# Patient Record
Sex: MALE | Race: OTHER
[De-identification: names, ages, dates, MRNs, and addresses within clinical notes are randomized per-mention and may not be internally consistent; named-entity substitution may affect disease eponyms.]

---

## 2017-04-27 ENCOUNTER — HOSPITAL ENCOUNTER (OUTPATIENT)
Dept: HOSPITAL 8 - LAB | Age: 82
Discharge: HOME | End: 2017-04-27
Attending: FAMILY MEDICINE
Payer: MEDICARE

## 2017-04-27 DIAGNOSIS — Z12.5: Primary | ICD-10-CM

## 2017-04-27 LAB
AST SERPL-CCNC: 18 U/L (ref 15–37)
BUN SERPL-MCNC: 18 MG/DL (ref 7–18)
PSA SERPL-MCNC: 1.76 NG/ML (ref 0–4)

## 2017-04-27 PROCEDURE — 80061 LIPID PANEL: CPT

## 2017-04-27 PROCEDURE — 36415 COLL VENOUS BLD VENIPUNCTURE: CPT

## 2017-04-27 PROCEDURE — 85025 COMPLETE CBC W/AUTO DIFF WBC: CPT

## 2017-04-27 PROCEDURE — 80053 COMPREHEN METABOLIC PANEL: CPT

## 2017-04-27 PROCEDURE — 84153 ASSAY OF PSA TOTAL: CPT

## 2018-08-09 ENCOUNTER — HOSPITAL ENCOUNTER (EMERGENCY)
Dept: HOSPITAL 8 - ED | Age: 83
Discharge: HOME | End: 2018-08-09
Payer: MEDICARE

## 2018-08-09 VITALS — SYSTOLIC BLOOD PRESSURE: 135 MMHG | DIASTOLIC BLOOD PRESSURE: 82 MMHG

## 2018-08-09 VITALS — WEIGHT: 214.95 LBS | BODY MASS INDEX: 34.55 KG/M2 | HEIGHT: 66 IN

## 2018-08-09 DIAGNOSIS — R10.13: Primary | ICD-10-CM

## 2018-08-09 LAB
ALBUMIN SERPL-MCNC: 3.9 G/DL (ref 3.4–5)
ALP SERPL-CCNC: 48 U/L (ref 45–117)
ALT SERPL-CCNC: 31 U/L (ref 12–78)
ANION GAP SERPL CALC-SCNC: 7 MMOL/L (ref 5–15)
BASOPHILS # BLD AUTO: 0.04 X10^3/UL (ref 0–0.1)
BASOPHILS NFR BLD AUTO: 0 % (ref 0–1)
BILIRUB SERPL-MCNC: 1.2 MG/DL (ref 0.2–1)
CALCIUM SERPL-MCNC: 9.1 MG/DL (ref 8.5–10.1)
CHLORIDE SERPL-SCNC: 106 MMOL/L (ref 98–107)
CREAT SERPL-MCNC: 1.24 MG/DL (ref 0.7–1.3)
EOSINOPHIL # BLD AUTO: 0.14 X10^3/UL (ref 0–0.4)
EOSINOPHIL NFR BLD AUTO: 2 % (ref 1–7)
ERYTHROCYTE [DISTWIDTH] IN BLOOD BY AUTOMATED COUNT: 15.4 % (ref 9.4–14.8)
LYMPHOCYTES # BLD AUTO: 1.79 X10^3/UL (ref 1–3.4)
LYMPHOCYTES NFR BLD AUTO: 19 % (ref 22–44)
MCH RBC QN AUTO: 32.7 PG (ref 27.5–34.5)
MCHC RBC AUTO-ENTMCNC: 33.9 G/DL (ref 33.2–36.2)
MCV RBC AUTO: 96.5 FL (ref 81–97)
MD: NO
MONOCYTES # BLD AUTO: 0.81 X10^3/UL (ref 0.2–0.8)
MONOCYTES NFR BLD AUTO: 9 % (ref 2–9)
NEUTROPHILS # BLD AUTO: 6.69 X10^3/UL (ref 1.8–6.8)
NEUTROPHILS NFR BLD AUTO: 71 % (ref 42–75)
PLATELET # BLD AUTO: 275 X10^3/UL (ref 130–400)
PMV BLD AUTO: 7.2 FL (ref 7.4–10.4)
PROT SERPL-MCNC: 8.3 G/DL (ref 6.4–8.2)
RBC # BLD AUTO: 5.2 X10^6/UL (ref 4.38–5.82)
TROPONIN I SERPL-MCNC: < 0.015 NG/ML (ref 0–0.04)

## 2018-08-09 PROCEDURE — 93005 ELECTROCARDIOGRAM TRACING: CPT

## 2018-08-09 PROCEDURE — 36415 COLL VENOUS BLD VENIPUNCTURE: CPT

## 2018-08-09 PROCEDURE — 83690 ASSAY OF LIPASE: CPT

## 2018-08-09 PROCEDURE — 71045 X-RAY EXAM CHEST 1 VIEW: CPT

## 2018-08-09 PROCEDURE — 76700 US EXAM ABDOM COMPLETE: CPT

## 2018-08-09 PROCEDURE — 83880 ASSAY OF NATRIURETIC PEPTIDE: CPT

## 2018-08-09 PROCEDURE — 99285 EMERGENCY DEPT VISIT HI MDM: CPT

## 2018-08-09 PROCEDURE — 80053 COMPREHEN METABOLIC PANEL: CPT

## 2018-08-09 PROCEDURE — 85025 COMPLETE CBC W/AUTO DIFF WBC: CPT

## 2018-08-09 PROCEDURE — 84484 ASSAY OF TROPONIN QUANT: CPT

## 2019-07-04 ENCOUNTER — HOSPITAL ENCOUNTER (EMERGENCY)
Dept: HOSPITAL 8 - ED | Age: 84
Discharge: HOME | End: 2019-07-04
Payer: MEDICARE

## 2019-07-04 VITALS — WEIGHT: 213.85 LBS | HEIGHT: 66 IN | BODY MASS INDEX: 34.37 KG/M2

## 2019-07-04 VITALS — DIASTOLIC BLOOD PRESSURE: 86 MMHG | SYSTOLIC BLOOD PRESSURE: 145 MMHG

## 2019-07-04 DIAGNOSIS — Z87.891: ICD-10-CM

## 2019-07-04 DIAGNOSIS — F31.9: ICD-10-CM

## 2019-07-04 DIAGNOSIS — H10.9: Primary | ICD-10-CM

## 2019-07-04 PROCEDURE — 99283 EMERGENCY DEPT VISIT LOW MDM: CPT

## 2019-07-04 NOTE — NUR
LATE ENTRY FOR 1830  Patient/Caregiver given discharge instructions and they 
have confirmed that they understand the instructions.  Patient ambulatory with 
steady gait. PT LEFT WITH ALL PERSONAL BELONGINGS.

## 2021-01-15 DIAGNOSIS — Z23 NEED FOR VACCINATION: ICD-10-CM

## 2021-06-09 ENCOUNTER — HOSPITAL ENCOUNTER (EMERGENCY)
Dept: HOSPITAL 8 - ED | Age: 86
Discharge: HOME | End: 2021-06-09
Payer: MEDICARE

## 2021-06-09 VITALS — HEIGHT: 69 IN | BODY MASS INDEX: 30.79 KG/M2 | WEIGHT: 207.9 LBS

## 2021-06-09 VITALS — SYSTOLIC BLOOD PRESSURE: 129 MMHG | DIASTOLIC BLOOD PRESSURE: 70 MMHG

## 2021-06-09 DIAGNOSIS — M51.34: Primary | ICD-10-CM

## 2021-06-09 LAB
ANION GAP SERPL CALC-SCNC: 8 MMOL/L (ref 5–15)
BASOPHILS # BLD AUTO: 0 X10^3/UL (ref 0–0.1)
BASOPHILS NFR BLD AUTO: 0 % (ref 0–1)
CALCIUM SERPL-MCNC: 8.6 MG/DL (ref 8.5–10.1)
CHLORIDE SERPL-SCNC: 109 MMOL/L (ref 98–107)
CREAT SERPL-MCNC: 0.86 MG/DL (ref 0.7–1.3)
EOSINOPHIL # BLD AUTO: 0 X10^3/UL (ref 0–0.4)
EOSINOPHIL NFR BLD AUTO: 0 % (ref 1–7)
ERYTHROCYTE [DISTWIDTH] IN BLOOD BY AUTOMATED COUNT: 13.8 % (ref 9.4–14.8)
LYMPHOCYTES # BLD AUTO: 0.7 X10^3/UL (ref 1–3.4)
LYMPHOCYTES NFR BLD AUTO: 10 % (ref 22–44)
MCH RBC QN AUTO: 32.7 PG (ref 27.5–34.5)
MCHC RBC AUTO-ENTMCNC: 34.1 G/DL (ref 33.2–36.2)
MICROSCOPIC: (no result)
MONOCYTES # BLD AUTO: 0.6 X10^3/UL (ref 0.2–0.8)
MONOCYTES NFR BLD AUTO: 9 % (ref 2–9)
NEUTROPHILS # BLD AUTO: 5.9 X10^3/UL (ref 1.8–6.8)
NEUTROPHILS NFR BLD AUTO: 81 % (ref 42–75)
PLATELET # BLD AUTO: 205 X10^3/UL (ref 130–400)
PMV BLD AUTO: 7 FL (ref 7.4–10.4)
RBC # BLD AUTO: 4.85 X10^6/UL (ref 4.38–5.82)

## 2021-06-09 PROCEDURE — 36415 COLL VENOUS BLD VENIPUNCTURE: CPT

## 2021-06-09 PROCEDURE — 99284 EMERGENCY DEPT VISIT MOD MDM: CPT

## 2021-06-09 PROCEDURE — 72110 X-RAY EXAM L-2 SPINE 4/>VWS: CPT

## 2021-06-09 PROCEDURE — 80048 BASIC METABOLIC PNL TOTAL CA: CPT

## 2021-06-09 PROCEDURE — 81001 URINALYSIS AUTO W/SCOPE: CPT

## 2021-06-09 PROCEDURE — 85025 COMPLETE CBC W/AUTO DIFF WBC: CPT

## 2021-06-09 NOTE — NUR
PT PRESENTS TO ED WITH C/O LOWER BACK PAIN X2 WEEKS. PT STATES PAIN STARTED IN 
UPPER LEFT QUADRANT OF BACK AND HAS NOW RADIATED TO LWOER BACK BILATERALLY. PT 
STATES IT HAS GOTTEN PROGRESSIVELY WORSE AND NOW HAS DIFFICULTY WALKING. PT 
DENIES ANY TRAUMA OR OTHER INJURIES. PT A&O, RESPS EVEN AND UNLABORED, NICANOR GOLD. DAUGHTER AT BEDSIDE, CALL LIGHT IN REACH.

## 2021-06-09 NOTE — NUR
PT AMBULATORY TO BATHROOM WITH STEADY GAIT, URINE SAMPLE PROVIDED. STATES PAIN 
IS IMPROVING WITH MEDICATION.

## 2021-06-09 NOTE — NUR
discharge intructions reviewed, pt verbalized understanding. ambulatory to 
discharge with steady gait, no complaints at time of discharge.

## 2021-07-12 ENCOUNTER — HOSPITAL ENCOUNTER (OUTPATIENT)
Dept: HOSPITAL 8 - CFH | Age: 86
Discharge: HOME | End: 2021-07-12
Attending: UROLOGY
Payer: MEDICARE

## 2021-07-12 DIAGNOSIS — N28.89: ICD-10-CM

## 2021-07-12 DIAGNOSIS — K57.30: Primary | ICD-10-CM

## 2021-07-12 DIAGNOSIS — N28.1: ICD-10-CM

## 2021-07-12 DIAGNOSIS — N40.0: ICD-10-CM

## 2021-07-12 PROCEDURE — 74178 CT ABD&PLV WO CNTR FLWD CNTR: CPT

## 2021-07-22 ENCOUNTER — HOSPITAL ENCOUNTER (OUTPATIENT)
Dept: RADIOLOGY | Facility: MEDICAL CENTER | Age: 86
End: 2021-07-22
Payer: MEDICARE

## 2021-08-16 NOTE — CONSULTS
Agree with full note by CHEYANNE Wu. Time spent on day of visit was 30 minutes.    Interventional Oncology Consultation      Re: Sammy Yanez     MRN: [id number]   : 1935    Sammy Yanez was referred by Dano Harding MD. He is a 86 y.o. male seen in clinic for evaluation and possible intervention of a 3.1 cm T1a RIGHT renal mass. He is also under the care of Daina Suero MD and his anticoagulation is managed by Heath Ding MD.    History of Present Illness:   presented with gross hematuria after starting anticoagulants for atrial fibrillation. Workup revealed a normal bladder and a large prostate with no source of bleeding and it was determined to be secondary to his medication. Additional imaging revealed an incidental right renal mass 3.1 cm in size.     He has discussed surgical options and is now presenting to the Interventional Oncology Service for review of imaging studies and discussion of diagnostic and therapeutic options for the renal mass. The lesion has not been biopsied. He has had two surgeries in the past with no problems. He has an upcoming appointment with Dr. Ding . He has received his first COVID vaccination and is scheduled for the second vaccine on . He is accompanied by his daughter Katharine, who is translating American at his request, to the appointment today.     No past medical history on file.  No past surgical history on file.  Social History     Socioeconomic History   • Marital status: Not on file     Spouse name: Not on file   • Number of children: Not on file   • Years of education: Not on file   • Highest education level: Not on file   Occupational History   • Not on file   Tobacco Use   • Smoking status: Not on file   Substance and Sexual Activity   • Alcohol use: Not on file   • Drug use: Not on file   • Sexual activity: Not on file   Other Topics Concern   • Not on file   Social History Narrative   • Not on file     Social Determinants of  Health     Financial Resource Strain:    • Difficulty of Paying Living Expenses:    Food Insecurity:    • Worried About Running Out of Food in the Last Year:    • Ran Out of Food in the Last Year:    Transportation Needs:    • Lack of Transportation (Medical):    • Lack of Transportation (Non-Medical):    Physical Activity:    • Days of Exercise per Week:    • Minutes of Exercise per Session:    Stress:    • Feeling of Stress :    Social Connections:    • Frequency of Communication with Friends and Family:    • Frequency of Social Gatherings with Friends and Family:    • Attends [employer] Services:    • Active Member of Clubs or Organizations:    • Attends Club or Organization Meetings:    • Marital Status:    Intimate Partner Violence:    • Fear of Current or Ex-Partner:    • Emotionally Abused:    • Physically Abused:    • Sexually Abused:      No family history on file.    Review of Systems   Constitutional: Negative.    HENT: Positive for hearing loss.    Respiratory: Negative.    Cardiovascular: Positive for chest pain.        Positive for irregular heart rate   Genitourinary: Negative for flank pain and hematuria.   Musculoskeletal: Positive for back pain (mid back).   Neurological: Positive for dizziness and headaches.   Psychiatric/Behavioral: Negative for substance abuse.     A comprehensive 14-point review of systems was negative except as described above.     Labs:   None available    Pathology:  None available    Radiology:   CT AP on 7/12/21 at [facility]:        No current outpatient medications on file.     No current facility-administered medications for this encounter.       Not on File    Physical Exam  Constitutional:       General: He is not in acute distress.     Appearance: He is not diaphoretic.   HENT:      Head: Normocephalic.   Eyes:      General: No scleral icterus.  Pulmonary:      Effort: Pulmonary effort is normal. No respiratory distress.   Abdominal:      General: There is no  distension.   Skin:     General: Skin is warm and dry.      Coloration: Skin is not pale.      Findings: No erythema or rash.   Neurological:      Mental Status: He is alert and oriented to person, place, and time. He is not disoriented.      Cranial Nerves: No cranial nerve deficit or facial asymmetry.      Sensory: Sensation is intact.      Motor: No weakness or tremor.      Coordination: Coordination normal.      Gait: Gait is intact. Gait normal.   Psychiatric:         Mood and Affect: Mood and affect normal.         Behavior: Behavior normal.         Thought Content: Thought content normal.         Cognition and Memory: Memory normal.         Judgment: Judgment normal.       Impression:   1. T1a RIGHT renal mass, 3 cm, amenable to percutaneous cryoablation.  2. Gross hematuria, secondary to anticoagulation.  3. Atrial fibrillation, on aspirin.  4. Chronic heart failure.   5. Hypertension.    Plan:   Patient presenting with a RIGHT renal mass. We have personally reviewed history and imaging studies, examine the patient and discussed treatment options. As discussed with the patient, there is an 80% chance this is a renal cell carcinoma, which are almost always very slow growing and rarely cause significant morbidity until they approach 4 cm in size. The remainder of these lesions are mostly oncocytomas.     We discussed options including watchful waiting with serial imaging, image guided biopsy, image guided cryoablation with and without biopsy, and nephron sparing surgery or nephrectomy. Percutaneous biopsy and cryoablation may be accomplished separately or on the same procedure date. We discussed the possibility of non-diagnosis despite a possible biopsy attempt. If we proceed to cryoablation without definitive diagnostic tissue sampling the followup regimen will proceed as if we had a cancer diagnosis. We discussed the risks and benefits of each approach. Specific to biopsy and cryoablation we discussed the  risks of bleeding, infection, damage to the kidney or its collecting system, damage to adjacent organs or nerve injury, tract seeding, skin injury, pneumothorax, reaction to medications given during the procedure, and potential hospitalization following the procedure.     After consideration and discussion,  decided to proceed with biopsy and cryoablation in the same setting as it is probable that this renal mass is a renal cell carcinoma and the patient wants to minimize the number of procedures. If technically feasible during the procedure the lesion will be biopsied. We will plan on performing the procedure an outpatient basis with the assistance of the anesthesia service for pain control and also to facilitate respiratory suspension for lesion targeting.  will return to clinic approximately 12 weeks following the procedure with followup imaging and further discussion. If there is residual or recurrent disease we will come up with a surveillance or retreatment plan at the time of the followup visit. The patient has been scheduled for September 20. Written procedure instructions were provided to the patient. We will request clearance to hold aspirin for 3 days pre procedure from his cardiologist and notify him that we anticipate 90 minutes of anesthesia.    Interventional Radiology   [facility]   [address] [id number]  [place] [address]  [phone]

## 2021-08-17 ENCOUNTER — HOSPITAL ENCOUNTER (OUTPATIENT)
Dept: RADIOLOGY | Facility: MEDICAL CENTER | Age: 86
End: 2021-08-17
Attending: UROLOGY
Payer: MEDICARE

## 2021-08-17 DIAGNOSIS — R31.0 GROSS HEMATURIA: ICD-10-CM

## 2021-08-17 ASSESSMENT — ENCOUNTER SYMPTOMS
FLANK PAIN: 0
BACK PAIN: 1
RESPIRATORY NEGATIVE: 1
CONSTITUTIONAL NEGATIVE: 1
DIZZINESS: 1
HEADACHES: 1

## 2021-08-17 ASSESSMENT — LIFESTYLE VARIABLES: SUBSTANCE_ABUSE: 0

## 2021-08-23 ENCOUNTER — HOSPITAL ENCOUNTER (OUTPATIENT)
Dept: HOSPITAL 8 - CFH | Age: 86
Discharge: HOME | End: 2021-08-23
Attending: INTERNAL MEDICINE
Payer: MEDICARE

## 2021-08-23 ENCOUNTER — HOSPITAL ENCOUNTER (OUTPATIENT)
Dept: HOSPITAL 8 - RAD | Age: 86
Discharge: HOME | End: 2021-08-23
Attending: INTERNAL MEDICINE
Payer: MEDICARE

## 2021-08-23 DIAGNOSIS — I25.9: Primary | ICD-10-CM

## 2021-08-23 DIAGNOSIS — I77.810: ICD-10-CM

## 2021-08-23 DIAGNOSIS — I48.91: ICD-10-CM

## 2021-08-23 DIAGNOSIS — I42.9: ICD-10-CM

## 2021-08-23 DIAGNOSIS — I51.7: Primary | ICD-10-CM

## 2021-08-23 DIAGNOSIS — I22.2: ICD-10-CM

## 2021-08-23 PROCEDURE — 93017 CV STRESS TEST TRACING ONLY: CPT

## 2021-08-23 PROCEDURE — A9502 TC99M TETROFOSMIN: HCPCS

## 2021-08-23 PROCEDURE — 71046 X-RAY EXAM CHEST 2 VIEWS: CPT

## 2021-08-23 PROCEDURE — 78452 HT MUSCLE IMAGE SPECT MULT: CPT

## 2021-08-30 ENCOUNTER — HOSPITAL ENCOUNTER (OUTPATIENT)
Dept: HOSPITAL 8 - CFH | Age: 86
Discharge: HOME | End: 2021-08-30
Attending: INTERNAL MEDICINE
Payer: MEDICARE

## 2021-08-30 DIAGNOSIS — I71.2: Primary | ICD-10-CM

## 2021-08-30 DIAGNOSIS — N28.1: ICD-10-CM

## 2021-08-30 DIAGNOSIS — N28.89: ICD-10-CM

## 2021-08-30 DIAGNOSIS — I31.3: ICD-10-CM

## 2021-08-30 DIAGNOSIS — I25.10: ICD-10-CM

## 2021-08-30 PROCEDURE — 71275 CT ANGIOGRAPHY CHEST: CPT

## 2021-08-30 PROCEDURE — 82565 ASSAY OF CREATININE: CPT

## 2021-09-07 ENCOUNTER — PRE-ADMISSION TESTING (OUTPATIENT)
Dept: ADMISSIONS | Facility: MEDICAL CENTER | Age: 86
End: 2021-09-07
Attending: RADIOLOGY
Payer: MEDICARE

## 2021-09-07 RX ORDER — METOPROLOL SUCCINATE 50 MG/1
TABLET, EXTENDED RELEASE ORAL EVERY MORNING
COMMUNITY
Start: 2021-08-30 | End: 2022-09-19

## 2021-09-07 RX ORDER — METOPROLOL SUCCINATE 100 MG/1
TABLET, EXTENDED RELEASE ORAL
COMMUNITY
Start: 2021-09-03

## 2021-09-07 RX ORDER — LISINOPRIL 5 MG/1
5 TABLET ORAL EVERY MORNING
COMMUNITY
Start: 2021-07-07 | End: 2022-04-26 | Stop reason: SDUPTHER

## 2021-09-07 RX ORDER — AMITRIPTYLINE HYDROCHLORIDE 10 MG/1
10 TABLET, FILM COATED ORAL NIGHTLY PRN
COMMUNITY
Start: 2021-08-12 | End: 2021-11-18

## 2021-09-07 RX ORDER — ASPIRIN 325 MG
325 TABLET ORAL PRN
Status: ON HOLD | COMMUNITY
End: 2021-09-20

## 2021-09-07 RX ORDER — TIOTROPIUM BROMIDE 18 UG/1
18 CAPSULE ORAL; RESPIRATORY (INHALATION) DAILY
COMMUNITY
Start: 2021-08-04 | End: 2022-04-26 | Stop reason: CLARIF

## 2021-09-07 RX ORDER — LORATADINE 10 MG
81 TABLET ORAL EVERY MORNING
Status: ON HOLD | COMMUNITY
Start: 2021-06-30 | End: 2021-09-20

## 2021-09-20 ENCOUNTER — APPOINTMENT (OUTPATIENT)
Dept: RADIOLOGY | Facility: MEDICAL CENTER | Age: 86
End: 2021-09-20
Attending: RADIOLOGY
Payer: MEDICARE

## 2021-09-20 ENCOUNTER — HOSPITAL ENCOUNTER (OUTPATIENT)
Facility: MEDICAL CENTER | Age: 86
End: 2021-09-20
Attending: RADIOLOGY | Admitting: RADIOLOGY
Payer: MEDICARE

## 2021-09-20 ENCOUNTER — ANESTHESIA EVENT (OUTPATIENT)
Dept: RADIOLOGY | Facility: MEDICAL CENTER | Age: 86
End: 2021-09-20
Payer: MEDICARE

## 2021-09-20 ENCOUNTER — ANESTHESIA (OUTPATIENT)
Dept: RADIOLOGY | Facility: MEDICAL CENTER | Age: 86
End: 2021-09-20
Payer: MEDICARE

## 2021-09-20 VITALS
HEIGHT: 67 IN | TEMPERATURE: 97.2 F | OXYGEN SATURATION: 92 % | BODY MASS INDEX: 32.66 KG/M2 | WEIGHT: 208.11 LBS | DIASTOLIC BLOOD PRESSURE: 84 MMHG | RESPIRATION RATE: 12 BRPM | HEART RATE: 90 BPM | SYSTOLIC BLOOD PRESSURE: 148 MMHG

## 2021-09-20 DIAGNOSIS — N28.89 RIGHT RENAL MASS: ICD-10-CM

## 2021-09-20 LAB
ANION GAP SERPL CALC-SCNC: 10 MMOL/L (ref 7–16)
BUN SERPL-MCNC: 21 MG/DL (ref 8–22)
CALCIUM SERPL-MCNC: 9.1 MG/DL (ref 8.5–10.5)
CHLORIDE SERPL-SCNC: 104 MMOL/L (ref 96–112)
CO2 SERPL-SCNC: 24 MMOL/L (ref 20–33)
CREAT SERPL-MCNC: 0.82 MG/DL (ref 0.5–1.4)
EKG IMPRESSION: NORMAL
ERYTHROCYTE [DISTWIDTH] IN BLOOD BY AUTOMATED COUNT: 50.8 FL (ref 35.9–50)
EXTERNAL QUALITY CONTROL: NORMAL
GLUCOSE SERPL-MCNC: 96 MG/DL (ref 65–99)
HCT VFR BLD AUTO: 47 % (ref 42–52)
HGB BLD-MCNC: 15.6 G/DL (ref 14–18)
INR PPP: 1.06 (ref 0.87–1.13)
MCH RBC QN AUTO: 32.7 PG (ref 27–33)
MCHC RBC AUTO-ENTMCNC: 33.2 G/DL (ref 33.7–35.3)
MCV RBC AUTO: 98.5 FL (ref 81.4–97.8)
PATHOLOGY CONSULT NOTE: NORMAL
PLATELET # BLD AUTO: 217 K/UL (ref 164–446)
PMV BLD AUTO: 9 FL (ref 9–12.9)
POTASSIUM SERPL-SCNC: 4.3 MMOL/L (ref 3.6–5.5)
PROTHROMBIN TIME: 13.5 SEC (ref 12–14.6)
RBC # BLD AUTO: 4.77 M/UL (ref 4.7–6.1)
SARS-COV+SARS-COV-2 AG RESP QL IA.RAPID: NEGATIVE
SODIUM SERPL-SCNC: 138 MMOL/L (ref 135–145)
WBC # BLD AUTO: 7.1 K/UL (ref 4.8–10.8)

## 2021-09-20 PROCEDURE — 93010 ELECTROCARDIOGRAM REPORT: CPT | Performed by: INTERNAL MEDICINE

## 2021-09-20 PROCEDURE — 88342 IMHCHEM/IMCYTCHM 1ST ANTB: CPT

## 2021-09-20 PROCEDURE — 700111 HCHG RX REV CODE 636 W/ 250 OVERRIDE (IP): Performed by: ANESTHESIOLOGY

## 2021-09-20 PROCEDURE — 80048 BASIC METABOLIC PNL TOTAL CA: CPT

## 2021-09-20 PROCEDURE — 88305 TISSUE EXAM BY PATHOLOGIST: CPT

## 2021-09-20 PROCEDURE — 88341 IMHCHEM/IMCYTCHM EA ADD ANTB: CPT

## 2021-09-20 PROCEDURE — A9270 NON-COVERED ITEM OR SERVICE: HCPCS | Performed by: ANESTHESIOLOGY

## 2021-09-20 PROCEDURE — 85027 COMPLETE CBC AUTOMATED: CPT

## 2021-09-20 PROCEDURE — 85610 PROTHROMBIN TIME: CPT

## 2021-09-20 PROCEDURE — 4410588 CT-CRYOABLATION RENAL TUMOR UNILATERAL

## 2021-09-20 PROCEDURE — 160002 HCHG RECOVERY MINUTES (STAT)

## 2021-09-20 PROCEDURE — 93005 ELECTROCARDIOGRAM TRACING: CPT | Performed by: ANESTHESIOLOGY

## 2021-09-20 PROCEDURE — 700101 HCHG RX REV CODE 250: Performed by: ANESTHESIOLOGY

## 2021-09-20 PROCEDURE — 700105 HCHG RX REV CODE 258: Performed by: RADIOLOGY

## 2021-09-20 PROCEDURE — 700102 HCHG RX REV CODE 250 W/ 637 OVERRIDE(OP): Performed by: ANESTHESIOLOGY

## 2021-09-20 PROCEDURE — 87426 SARSCOV CORONAVIRUS AG IA: CPT | Performed by: RADIOLOGY

## 2021-09-20 RX ORDER — ONDANSETRON 2 MG/ML
4 INJECTION INTRAMUSCULAR; INTRAVENOUS
Status: DISCONTINUED | OUTPATIENT
Start: 2021-09-20 | End: 2021-09-20 | Stop reason: HOSPADM

## 2021-09-20 RX ORDER — HALOPERIDOL 5 MG/ML
1 INJECTION INTRAMUSCULAR
Status: DISCONTINUED | OUTPATIENT
Start: 2021-09-20 | End: 2021-09-20 | Stop reason: HOSPADM

## 2021-09-20 RX ORDER — GABAPENTIN 300 MG/1
300 CAPSULE ORAL ONCE
Status: COMPLETED | OUTPATIENT
Start: 2021-09-20 | End: 2021-09-20

## 2021-09-20 RX ORDER — LIDOCAINE HYDROCHLORIDE 10 MG/ML
INJECTION, SOLUTION INFILTRATION; PERINEURAL
Status: DISCONTINUED
Start: 2021-09-20 | End: 2021-09-20 | Stop reason: HOSPADM

## 2021-09-20 RX ORDER — OXYCODONE HCL 5 MG/5 ML
10 SOLUTION, ORAL ORAL
Status: DISCONTINUED | OUTPATIENT
Start: 2021-09-20 | End: 2021-09-20 | Stop reason: HOSPADM

## 2021-09-20 RX ORDER — ACETAMINOPHEN 500 MG
1000 TABLET ORAL ONCE
Status: COMPLETED | OUTPATIENT
Start: 2021-09-20 | End: 2021-09-20

## 2021-09-20 RX ORDER — HYDRALAZINE HYDROCHLORIDE 20 MG/ML
5 INJECTION INTRAMUSCULAR; INTRAVENOUS
Status: DISCONTINUED | OUTPATIENT
Start: 2021-09-20 | End: 2021-09-20 | Stop reason: HOSPADM

## 2021-09-20 RX ORDER — MIDAZOLAM HYDROCHLORIDE 1 MG/ML
INJECTION INTRAMUSCULAR; INTRAVENOUS
Status: COMPLETED
Start: 2021-09-20 | End: 2021-09-20

## 2021-09-20 RX ORDER — OXYCODONE HYDROCHLORIDE 5 MG/1
5 TABLET ORAL
Status: DISCONTINUED | OUTPATIENT
Start: 2021-09-20 | End: 2021-09-20 | Stop reason: HOSPADM

## 2021-09-20 RX ORDER — HYDROMORPHONE HYDROCHLORIDE 1 MG/ML
0.4 INJECTION, SOLUTION INTRAMUSCULAR; INTRAVENOUS; SUBCUTANEOUS
Status: DISCONTINUED | OUTPATIENT
Start: 2021-09-20 | End: 2021-09-20 | Stop reason: HOSPADM

## 2021-09-20 RX ORDER — CEFAZOLIN SODIUM 1 G/3ML
INJECTION, POWDER, FOR SOLUTION INTRAMUSCULAR; INTRAVENOUS PRN
Status: DISCONTINUED | OUTPATIENT
Start: 2021-09-20 | End: 2021-09-20 | Stop reason: SURG

## 2021-09-20 RX ORDER — PROTAMINE SULFATE 10 MG/ML
INJECTION, SOLUTION INTRAVENOUS
Status: COMPLETED
Start: 2021-09-20 | End: 2021-09-20

## 2021-09-20 RX ORDER — OXYCODONE HCL 5 MG/5 ML
5 SOLUTION, ORAL ORAL
Status: DISCONTINUED | OUTPATIENT
Start: 2021-09-20 | End: 2021-09-20 | Stop reason: HOSPADM

## 2021-09-20 RX ORDER — SODIUM CHLORIDE, SODIUM LACTATE, POTASSIUM CHLORIDE, CALCIUM CHLORIDE 600; 310; 30; 20 MG/100ML; MG/100ML; MG/100ML; MG/100ML
INJECTION, SOLUTION INTRAVENOUS CONTINUOUS
Status: ACTIVE | OUTPATIENT
Start: 2021-09-20 | End: 2021-09-20

## 2021-09-20 RX ORDER — HYDROMORPHONE HYDROCHLORIDE 1 MG/ML
0.2 INJECTION, SOLUTION INTRAMUSCULAR; INTRAVENOUS; SUBCUTANEOUS
Status: DISCONTINUED | OUTPATIENT
Start: 2021-09-20 | End: 2021-09-20 | Stop reason: HOSPADM

## 2021-09-20 RX ORDER — LANOLIN ALCOHOL/MO/W.PET/CERES
1000 CREAM (GRAM) TOPICAL DAILY
COMMUNITY
End: 2022-09-19

## 2021-09-20 RX ORDER — SOFT LENS RINSE,STORE SOLUTION
1-2 SOLUTION, NON-ORAL MISCELLANEOUS
COMMUNITY
End: 2022-09-19 | Stop reason: SDUPTHER

## 2021-09-20 RX ORDER — PHENYLEPHRINE HYDROCHLORIDE 10 MG/ML
INJECTION, SOLUTION INTRAMUSCULAR; INTRAVENOUS; SUBCUTANEOUS PRN
Status: DISCONTINUED | OUTPATIENT
Start: 2021-09-20 | End: 2021-09-20 | Stop reason: SURG

## 2021-09-20 RX ORDER — HYDROMORPHONE HYDROCHLORIDE 1 MG/ML
0.5 INJECTION, SOLUTION INTRAMUSCULAR; INTRAVENOUS; SUBCUTANEOUS
Status: DISCONTINUED | OUTPATIENT
Start: 2021-09-20 | End: 2021-09-20 | Stop reason: HOSPADM

## 2021-09-20 RX ORDER — LIDOCAINE HYDROCHLORIDE 20 MG/ML
INJECTION, SOLUTION EPIDURAL; INFILTRATION; INTRACAUDAL; PERINEURAL PRN
Status: DISCONTINUED | OUTPATIENT
Start: 2021-09-20 | End: 2021-09-20 | Stop reason: SURG

## 2021-09-20 RX ORDER — ISOPROTERENOL HYDROCHLORIDE 0.2 MG/ML
INJECTION, SOLUTION INTRAVENOUS
Status: COMPLETED
Start: 2021-09-20 | End: 2021-09-20

## 2021-09-20 RX ORDER — HYDROMORPHONE HYDROCHLORIDE 1 MG/ML
0.1 INJECTION, SOLUTION INTRAMUSCULAR; INTRAVENOUS; SUBCUTANEOUS
Status: DISCONTINUED | OUTPATIENT
Start: 2021-09-20 | End: 2021-09-20 | Stop reason: HOSPADM

## 2021-09-20 RX ORDER — DEXAMETHASONE SODIUM PHOSPHATE 4 MG/ML
INJECTION, SOLUTION INTRA-ARTICULAR; INTRALESIONAL; INTRAMUSCULAR; INTRAVENOUS; SOFT TISSUE PRN
Status: DISCONTINUED | OUTPATIENT
Start: 2021-09-20 | End: 2021-09-20 | Stop reason: SURG

## 2021-09-20 RX ORDER — METOPROLOL TARTRATE 1 MG/ML
INJECTION, SOLUTION INTRAVENOUS PRN
Status: DISCONTINUED | OUTPATIENT
Start: 2021-09-20 | End: 2021-09-20 | Stop reason: SURG

## 2021-09-20 RX ORDER — ASPIRIN 325 MG
325 TABLET ORAL EVERY 6 HOURS PRN
COMMUNITY
End: 2022-04-26

## 2021-09-20 RX ORDER — DIPHENHYDRAMINE HYDROCHLORIDE 50 MG/ML
12.5 INJECTION INTRAMUSCULAR; INTRAVENOUS
Status: DISCONTINUED | OUTPATIENT
Start: 2021-09-20 | End: 2021-09-20 | Stop reason: HOSPADM

## 2021-09-20 RX ORDER — METOPROLOL TARTRATE 1 MG/ML
1 INJECTION, SOLUTION INTRAVENOUS
Status: DISCONTINUED | OUTPATIENT
Start: 2021-09-20 | End: 2021-09-20 | Stop reason: HOSPADM

## 2021-09-20 RX ORDER — SODIUM CHLORIDE, SODIUM LACTATE, POTASSIUM CHLORIDE, CALCIUM CHLORIDE 600; 310; 30; 20 MG/100ML; MG/100ML; MG/100ML; MG/100ML
INJECTION, SOLUTION INTRAVENOUS CONTINUOUS
Status: DISCONTINUED | OUTPATIENT
Start: 2021-09-20 | End: 2021-09-20 | Stop reason: HOSPADM

## 2021-09-20 RX ADMIN — GABAPENTIN 300 MG: 300 CAPSULE ORAL at 07:46

## 2021-09-20 RX ADMIN — HYDRALAZINE HYDROCHLORIDE 5 MG: 20 INJECTION, SOLUTION INTRAMUSCULAR; INTRAVENOUS at 12:31

## 2021-09-20 RX ADMIN — SODIUM CHLORIDE, POTASSIUM CHLORIDE, SODIUM LACTATE AND CALCIUM CHLORIDE: 600; 310; 30; 20 INJECTION, SOLUTION INTRAVENOUS at 07:46

## 2021-09-20 RX ADMIN — FENTANYL CITRATE 25 MCG: 50 INJECTION, SOLUTION INTRAMUSCULAR; INTRAVENOUS at 10:34

## 2021-09-20 RX ADMIN — PHENYLEPHRINE HYDROCHLORIDE 100 MCG: 10 INJECTION INTRAVENOUS at 10:10

## 2021-09-20 RX ADMIN — ROCURONIUM BROMIDE 45 MG: 10 INJECTION, SOLUTION INTRAVENOUS at 09:04

## 2021-09-20 RX ADMIN — PHENYLEPHRINE HYDROCHLORIDE 150 MCG: 10 INJECTION INTRAVENOUS at 09:48

## 2021-09-20 RX ADMIN — CEFAZOLIN 2 G: 330 INJECTION, POWDER, FOR SOLUTION INTRAMUSCULAR; INTRAVENOUS at 09:19

## 2021-09-20 RX ADMIN — PHENYLEPHRINE HYDROCHLORIDE 100 MCG: 10 INJECTION INTRAVENOUS at 09:24

## 2021-09-20 RX ADMIN — PROPOFOL 110 MG: 10 INJECTION, EMULSION INTRAVENOUS at 09:04

## 2021-09-20 RX ADMIN — PHENYLEPHRINE HYDROCHLORIDE 100 MCG: 10 INJECTION INTRAVENOUS at 09:58

## 2021-09-20 RX ADMIN — METOPROLOL TARTRATE 2 MG: 5 INJECTION, SOLUTION INTRAVENOUS at 09:21

## 2021-09-20 RX ADMIN — DEXAMETHASONE SODIUM PHOSPHATE 4 MG: 4 INJECTION, SOLUTION INTRA-ARTICULAR; INTRALESIONAL; INTRAMUSCULAR; INTRAVENOUS; SOFT TISSUE at 09:42

## 2021-09-20 RX ADMIN — SUGAMMADEX 200 MG: 100 INJECTION, SOLUTION INTRAVENOUS at 10:24

## 2021-09-20 RX ADMIN — PHENYLEPHRINE HYDROCHLORIDE 50 MCG: 10 INJECTION INTRAVENOUS at 10:16

## 2021-09-20 RX ADMIN — ACETAMINOPHEN 1000 MG: 500 TABLET ORAL at 07:46

## 2021-09-20 RX ADMIN — LIDOCAINE HYDROCHLORIDE 75 MG: 20 INJECTION, SOLUTION EPIDURAL; INFILTRATION; INTRACAUDAL at 09:04

## 2021-09-20 RX ADMIN — FENTANYL CITRATE 25 MCG: 50 INJECTION, SOLUTION INTRAMUSCULAR; INTRAVENOUS at 08:58

## 2021-09-20 RX ADMIN — PHENYLEPHRINE HYDROCHLORIDE 100 MCG: 10 INJECTION INTRAVENOUS at 09:35

## 2021-09-20 RX ADMIN — MIDAZOLAM 2 MG: 1 INJECTION INTRAMUSCULAR; INTRAVENOUS at 08:58

## 2021-09-20 NOTE — OR SURGEON
Immediate Post- Operative Note        Findings: RIGHT renal mass      Procedure(s): CT RIGHT renal mass cryoablation and biopsy      Estimated Blood Loss: Less than 5 ml        Complications: None            9/20/2021     10:18 AM     Del Silvestre M.D.

## 2021-09-20 NOTE — H&P
"History and Physical    Date: 9/20/2021    PCP: Pcp Pt States None      CC: RIGHT renal mass    HPI: This is a 86 y.o. male who is presenting with above    Past Medical History:   Diagnosis Date   • AAA (abdominal aortic aneurysm) ([facility])     9/7/21- per patients daughter.   • Arrhythmia    • Arthritis 09/07/2021    Lumbar area and shoulders.   • Cataract     Bilateral IOL's.   • COPD (chronic obstructive pulmonary disease) ([facility])     9/7/2021 - states pt not diagnosed with COPD, pulmonary referrel pending, uses inhaler daily at this time.   • Dental disorder     Several teeth missing/pulled.   • High cholesterol 09/07/2021    No medication at this time, daughter will follow up with PCP.   • History of UTI    • Hypertension    • Psychiatric problem 09/07/2021    Bipolar - no medication.   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       Past Surgical History:   Procedure Laterality Date   • OTHER  2004    \"Prostate cleaned out\"   • APPENDECTOMY      Around age 35-40.   • CHOLECYSTECTOMY      Around age 35-40.       Current Facility-Administered Medications   Medication Dose Route Frequency Provider Last Rate Last Admin   • lidocaine (XYLOCAINE) 1 % injection 0.5 mL  0.5 mL Intradermal Once PRN Del Silvestre M.D.       • lactated ringers infusion   Intravenous Continuous Del Silvestre M.D. 10 mL/hr at 09/20/21 0746 New Bag at 09/20/21 0746   • LIDOCAINE HCL 1 % INJ SOLN                 Social History     Socioeconomic History   • Marital status:      Spouse name: Not on file   • Number of children: Not on file   • Years of education: Not on file   • Highest education level: Not on file   Occupational History   • Not on file   Tobacco Use   • Smoking status: Former Smoker     Packs/day: 0.25     Years: 20.00     Pack years: 5.00     Types: Cigarettes   • Smokeless tobacco: Never Used   • Tobacco comment: Quit around age 41.   Vaping Use   • Vaping Use: Never used   Substance and " Sexual Activity   • Alcohol use: Not Currently     Comment: Very rarely, on special occasions   • Drug use: Never   • Sexual activity: Not on file   Other Topics Concern   • Not on file   Social History Narrative   • Not on file     Social Determinants of Health     Financial Resource Strain:    • Difficulty of Paying Living Expenses:    Food Insecurity:    • Worried About Running Out of Food in the Last Year:    • Ran Out of Food in the Last Year:    Transportation Needs:    • Lack of Transportation (Medical):    • Lack of Transportation (Non-Medical):    Physical Activity:    • Days of Exercise per Week:    • Minutes of Exercise per Session:    Stress:    • Feeling of Stress :    Social Connections:    • Frequency of Communication with Friends and Family:    • Frequency of Social Gatherings with Friends and Family:    • Attends Judaism Services:    • Active Member of Clubs or Organizations:    • Attends Club or Organization Meetings:    • Marital Status:    Intimate Partner Violence:    • Fear of Current or Ex-Partner:    • Emotionally Abused:    • Physically Abused:    • Sexually Abused:        History reviewed. No pertinent family history.    Allergies:  Patient has no known allergies.    Review of Systems:  Negative     Physical Exam  Constitutional:       Appearance: He is well-developed.   HENT:      Head: Normocephalic and atraumatic.   Eyes:      General: No scleral icterus.        Right eye: No discharge.         Left eye: No discharge.      Conjunctiva/sclera: Conjunctivae normal.   Cardiovascular:      Rate and Rhythm: Regular rhythm.   Pulmonary:      Effort: Pulmonary effort is normal. No respiratory distress.   Abdominal:      Palpations: Abdomen is soft.   Musculoskeletal:      Cervical back: Neck supple.   Skin:     General: Skin is warm and dry.   Neurological:      Mental Status: He is alert and oriented to person, place, and time.   Psychiatric:         Behavior: Behavior normal.          Thought Content: Thought content normal.         Judgment: Judgment normal.         Vital Signs  Blood Pressure : (!) 165/93   Temperature: 36.5 °C (97.7 °F)   Pulse: 65   Respiration: 18   Pulse Oximetry: 98 %        Labs:  Recent Labs     09/20/21  0740   WBC 7.1   RBC 4.77   HEMOGLOBIN 15.6   HEMATOCRIT 47.0   MCV 98.5*   MCH 32.7   MCHC 33.2*   RDW 50.8*   PLATELETCT 217   MPV 9.0     Recent Labs     09/20/21  0740   SODIUM 138   POTASSIUM 4.3   CHLORIDE 104   CO2 24   GLUCOSE 96   BUN 21   CREATININE 0.82   CALCIUM 9.1     Recent Labs     09/20/21  0740   INR 1.06     Recent Labs     09/20/21  0740   INR 1.06       Radiology:  CT-CRYOABLATION RENAL TUMOR UNILATERAL RIGHT    (Results Pending)             Assessment and Plan:This is a 86 y.o. here for RIGHT renal mass cryoablation

## 2021-09-20 NOTE — PROGRESS NOTES
Med Rec completed per patient's family at bedside ()  Allergies reviewed  No ORAL antibiotics in last 30 days

## 2021-09-20 NOTE — OR NURSING
Discharge instructions reviewed with pt and daughter. All verbalize understanding and demonstrate teach back. Discharge criteria met. PIV removed. Pt dressed and ambulated to bathroom with successful void.

## 2021-09-20 NOTE — OR NURSING
Patient received from OR at 1035, bedside report received from anesthesia/OR RN, 2 patient identifiers confirmed . Patient connected to monitors, assessed for general head to toe and pain/nausea. Ordered reviewed and checked.  Per Dr Mallory, keep patient SBP less than 180, orders in MAR for prn medications.

## 2021-09-20 NOTE — ANESTHESIA PREPROCEDURE EVALUATION
htn  copd  Hx afib  Aortic anuerysm per patiet  Relevant Problems   No relevant active problems       Physical Exam    Airway   Mallampati: II  TM distance: >3 FB  Neck ROM: full       Cardiovascular - normal exam  Rhythm: regular  Rate: normal  (-) murmur     Dental - normal exam           Pulmonary - normal exam  Breath sounds clear to auscultation     Abdominal    Neurological - normal exam                 Anesthesia Plan    ASA 3   ASA physical status 3 criteria: COPD    Plan - general       Airway plan will be ETT          Induction: intravenous    Postoperative Plan: Postoperative administration of opioids is intended.    Pertinent diagnostic labs and testing reviewed    Informed Consent:    Anesthetic plan and risks discussed with patient.    Use of blood products discussed with: patient whom consented to blood products.

## 2021-09-20 NOTE — ANESTHESIA TIME REPORT
Anesthesia Start and Stop Event Times     Date Time Event    9/20/2021 0818 Ready for Procedure     0854 Anesthesia Start     1037 Anesthesia Stop        Responsible Staff  09/20/21    Name Role Begin End    Yonis Mallory M.D. Anesth 0854 1037        Preop Diagnosis (Free Text):  Pre-op Diagnosis             Preop Diagnosis (Codes):    Post op Diagnosis  Renal malignant tumor, right (HCC)      Premium Reason  Non-Premium    Comments:

## 2021-09-20 NOTE — OR NURSING
At this time patient meets criteria for D/C to phase II/room. VSS, awake and appropriate, pain minimal or at a tolerable level per patient. Patient is oriented when questions asked but is picking at all the monitors and in and out of confusion at times, needs daughter to sit with him for the remainder of his bedrest to help reorient him. Report called to Norma BECKMAN and patient taken to phase II on [facility]. Daughter immediately brought back to bedside

## 2021-09-20 NOTE — OR NURSING
Assume care for pt in pre-op. Patient allergies and NPO status verified. Belongings secured. Patient verbalizes understanding of pain scale, expected course of stay and plan of care. Surgical site verified with patient. IV access established. Blood samples sent to lab for cbc, bmp, pt/inr. Call light within reach. No further needs at this time. Hourly rounding in place.

## 2021-09-20 NOTE — DISCHARGE INSTRUCTIONS
ACTIVITY: Rest and take it easy for the first 24 hours.  A responsible adult is recommended to remain with you during that time.  It is normal to feel sleepy.  We encourage you to not do anything that requires balance, judgment or coordination.    MILD FLU-LIKE SYMPTOMS ARE NORMAL. YOU MAY EXPERIENCE GENERALIZED MUSCLE ACHES, THROAT IRRITATION, HEADACHE AND/OR SOME NAUSEA.    FOR 24 HOURS DO NOT:  Drive, operate machinery or run household appliances.  Drink beer or alcoholic beverages.   Make important decisions or sign legal documents.    SPECIAL INSTRUCTIONS:   Percutaneous Kidney Biopsy, Care After  This sheet gives you information about how to care for yourself after your procedure. Your health care provider may also give you more specific instructions. If you have problems or questions, contact your health care provider.  What can I expect after the procedure?  After the procedure, it is common to have:  · Pain or soreness near the area where the needle went through your skin (biopsy site).  · Bright pink or cloudy urine for 24 hours after the procedure.  Follow these instructions at home:  Activity  · Return to your normal activities as told by your health care provider. Ask your health care provider what activities are safe for you.  · Do not drive for 24 hours if you were given a medicine to help you relax (sedative).  · Do not lift anything that is heavier than 10 lb (4.5 kg) until your health care provider tells you that it is safe.  · Avoid activities that take a lot of effort (are strenuous) until your health care provider approves. Most people will have to wait 2 weeks before returning to activities such as exercise or sexual intercourse.  General instructions    · Take over-the-counter and prescription medicines only as told by your health care provider.  · You may eat and drink after your procedure. Follow instructions from your health care provider about eating or drinking restrictions.  · Check  your biopsy site every day for signs of infection. Check for:  ? More redness, swelling, or pain.  ? More fluid or blood.  ? Warmth.  ? Pus or a bad smell.  · Keep all follow-up visits as told by your health care provider. This is important.  Contact a health care provider if:  · You have more redness, swelling, or pain around your biopsy site.  · You have more fluid or blood coming from your biopsy site.  · Your biopsy site feels warm to the touch.  · You have pus or a bad smell coming from your biopsy site.  · You have blood in your urine more than 24 hours after your procedure.  Get help right away if:  · You have dark red or brown urine.  · You have a fever.  · You are unable to urinate.  · You feel burning when you urinate.  · You feel faint.  · You have severe pain in your abdomen or side.  This information is not intended to replace advice given to you by your health care provider. Make sure you discuss any questions you have with your health care provider.  Document Released: 08/20/2014 Document Revised: 11/30/2018 Document Reviewed: 09/29/2017  [employer] Patient Education © 2020 [employer]      Cryoablation, Care After  This sheet gives you information about how to care for yourself after your procedure. Your health care provider may also give you more specific instructions. If you have problems or questions, contact your health care provider.  What can I expect after the procedure?  After the procedure, it is common to have:  · Soreness around the treatment area.  · Mild pain and swelling in the treatment area.  Follow these instructions at home:  Treatment area care  · Follow instructions from your health care provider about how to take care of your incision. Make sure you:  ? Wash your hands with soap and water before you change your bandage (dressing). If soap and water are not available, use hand .  ? Change your dressing as told by your health care provider.  ? Leave stitches (sutures) in  place. They may need to stay in place for 2 weeks or longer.  · Check your treatment area every day for signs of infection. Check for:  ? More redness, swelling, or pain.  ? More fluid or blood.  ? Warmth.  ? Pus or a bad smell.  · Keep the treated area clean, dry, and covered with a dressing until it has healed. Clean the area with soap and water or as told by your health care provider.  · You may shower if your health care provider approves. If your bandage gets wet, change it right away.  Activity  · Follow instructions from your health care provider about any activity limitations.  · Do not drive for 24 hours if you received a medicine to help you relax (sedative).  General instructions  · Take over-the-counter and prescription medicines only as told by your health care provider.  · Keep all follow-up visits as told by your health care provider. This is important.  Contact a health care provider if:  · You do not have a bowel movement for 2 days.  · You have nausea or vomiting.  · You have more redness, swelling, or pain around your treatment area.  · You have more fluid or blood coming from your treatment area.  · Your treatment area feels warm to the touch.  · You have pus or a bad smell coming from your treatment area.  · You have a fever.  Get help right away if:  · You have severe pain.  · You have trouble swallowing or breathing.  · You have severe weakness or dizziness.  · You have chest pain or shortness of breath.  This information is not intended to replace advice given to you by your health care provider. Make sure you discuss any questions you have with your health care provider.  Document Released: 10/08/2014 Document Revised: 11/30/2018 Document Reviewed: 05/17/2017  [employer]vier Patient Education © 2020 [employer]      DIET: To avoid nausea, slowly advance diet as tolerated, avoiding spicy or greasy foods for the first day.  Add more substantial food to your diet according to your physician's  instructions.  Babies can be fed formula or breast milk as soon as they are hungry.  INCREASE FLUIDS AND FIBER TO AVOID CONSTIPATION.    SURGICAL DRESSING/BATHING: *may remove dressing in 24-48 hours*    FOLLOW-UP APPOINTMENT:  A follow-up appointment should be arranged with your doctor; call to schedule.    You should CALL YOUR PHYSICIAN if you develop:  Fever greater than 101 degrees F.  Pain not relieved by medication, or persistent nausea or vomiting.  Excessive bleeding (blood soaking through dressing) or unexpected drainage from the wound.  Extreme redness or swelling around the incision site, drainage of pus or foul smelling drainage.  Inability to urinate or empty your bladder within 8 hours.  Problems with breathing or chest pain.    You should call 911 if you develop problems with breathing or chest pain.  If you are unable to contact your doctor or surgical center, you should go to the nearest emergency room or urgent care center.  Physician's telephone #: *Dr. Silvestre [phone]*    If any questions arise, call your doctor.  If your doctor is not available, please feel free to call the Surgical Center at [phone]. The Contact Center is open Monday through Friday 7AM to 5PM and may speak to a nurse at [phone], or toll free at [phone].     A registered nurse may call you a few days after your surgery to see how you are doing after your procedure.    MEDICATIONS: Resume taking daily medication.  Take prescribed pain medication with food.  If no medication is prescribed, you may take non-aspirin pain medication if needed.  PAIN MEDICATION CAN BE VERY CONSTIPATING.  Take a stool softener or laxative such as senokot, pericolace, or milk of magnesia if needed.    No prescription.    If your physician has prescribed pain medication that includes Acetaminophen (Tylenol), do not take additional Acetaminophen (Tylenol) while taking the prescribed medication.    Depression / Suicide Risk    As you  are discharged from this [facility] facility, it is important to learn how to keep safe from harming yourself.    Recognize the warning signs:  · Abrupt changes in personality, positive or negative- including increase in energy   · Giving away possessions  · Change in eating patterns- significant weight changes-  positive or negative  · Change in sleeping patterns- unable to sleep or sleeping all the time   · Unwillingness or inability to communicate  · Depression  · Unusual sadness, discouragement and loneliness  · Talk of wanting to die  · Neglect of personal appearance   · Rebelliousness- reckless behavior  · Withdrawal from people/activities they love  · Confusion- inability to concentrate     If you or a loved one observes any of these behaviors or has concerns about self-harm, here's what you can do:  · Talk about it- your feelings and reasons for harming yourself  · Remove any means that you might use to hurt yourself (examples: pills, rope, extension cords, firearm)  · Get professional help from the community (Mental Health, Substance Abuse, psychological counseling)  · Do not be alone:Call your Safe Contact- someone whom you trust who will be there for you.  · Call your local CRISIS HOTLINE 048-7205 or 507-650-9212  · Call your local Children's Mobile Crisis Response Team [place] (764) 817-9932 or www.Paixie.net  · Call the toll free National Suicide Prevention Hotlines   · National Suicide Prevention Lifeline 277-983-EUXL (7130)  · National Hope Line Network 800-SUICIDE (688-5948)

## 2021-09-20 NOTE — OR NURSING
Patient discharged home with daughter. Verbalized understanding of discharge instructions. Transported via wheelchair to [facility] without incident.

## 2021-09-20 NOTE — ANESTHESIA POSTPROCEDURE EVALUATION
Patient: Sammy Yanez    Procedure Summary     Date: 09/20/21 Room / Location: [facility] - [facility] - [facility]    Anesthesia Start: 0854 Anesthesia Stop: 1037    Procedure: CT-CRYOABLATION RENAL TUMOR UNILATERAL Diagnosis:       Right renal mass      (right renal mass)      (biopsy and cryoablation)    Scheduled Providers: Del Silvestre M.D.; Yonis Mallory M.D. Responsible Provider: Yonis Mallory M.D.    Anesthesia Type: general ASA Status: 3          Final Anesthesia Type: general  Last vitals  BP   Blood Pressure : (!) 161/91    Temp   36.2 °C (97.2 °F)    Pulse   90   Resp   12    SpO2   92 %      Anesthesia Post Evaluation    Patient location during evaluation: PACU  Patient participation: complete - patient participated  Level of consciousness: awake and alert    Airway patency: patent  Anesthetic complications: no  Cardiovascular status: hemodynamically stable  Respiratory status: acceptable  Hydration status: euvolemic    PONV: none          No complications documented.

## 2021-09-20 NOTE — ANESTHESIA PROCEDURE NOTES
Airway    Date/Time: 9/20/2021 9:07 AM  Performed by: Yonis Mallory M.D.  Authorized by: Yonis Mallory M.D.     Location:  OR  Urgency:  Elective  Indications for Airway Management:  Anesthesia      Spontaneous Ventilation: absent    Sedation Level:  Deep  Preoxygenated: Yes    Patient Position:  Sniffing  Final Airway Type:  Endotracheal airway  Final Endotracheal Airway:  ETT  Cuffed: Yes    Technique Used for Successful ETT Placement:  Direct laryngoscopy    Insertion Site:  Oral  Blade Type:  Glide  Laryngoscope Blade/Videolaryngoscope Blade Size:  3  ETT Size (mm):  8.0  Measured from:  Teeth  ETT to Teeth (cm):  22  Placement Verified by: auscultation and capnometry    Cormack-Lehane Classification:  Grade I - full view of glottis  Number of Attempts at Approach:  1

## 2021-09-20 NOTE — PROGRESS NOTES
CT IR RN note    Patient underwent a Right renal mass biopsy and cryoablation  by Dr. Silvestre and GA MD Song.  Procedure site was verified by MD using CT imaging guidance.  IR aparna Verduzco assisted. Patient was placed in a prone position.  Vitals were monitored by GA, see chart.   A gauze and tegaderm dressing was placed over surgical site. Report called to Annamarie BECKMAN. Pt transported by phan with RN to [facility], with monitor . Core   Biopsy Labs X 2 in formalin hand delivered to lab.

## 2021-09-25 NOTE — PROGRESS NOTES
"  REFERRING PHYSICIAN: Maikel Oliva DO.     CONSULTING PHYSICIAN: Rose La MD, FACS.    CHIEF COMPLAINT: ***    HISTORY OF PRESENT ILLNESS: The patient is a 86 y.o. male ***          PAST MEDICAL HISTORY:   Past Medical History:   Diagnosis Date   • AAA (abdominal aortic aneurysm) ([facility])     9/7/21- per patients daughter.   • Arrhythmia    • Arthritis 09/07/2021    Lumbar area and shoulders.   • Cataract     Bilateral IOL's.   • COPD (chronic obstructive pulmonary disease) ([facility])     9/7/2021 - states pt not diagnosed with COPD, pulmonary referrel pending, uses inhaler daily at this time.   • Dental disorder     Several teeth missing/pulled.   • High cholesterol 09/07/2021    No medication at this time, daughter will follow up with PCP.   • History of UTI    • Hypertension    • Psychiatric problem 09/07/2021    Bipolar - no medication.   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       PAST SURGICAL HISTORY:   Past Surgical History:   Procedure Laterality Date   • OTHER  2004    \"Prostate cleaned out\"   • APPENDECTOMY      Around age 35-40.   • CHOLECYSTECTOMY      Around age 35-40.       FAMILY HISTORY:   No family history on file.     SOCIAL HISTORY:   Social History     Socioeconomic History   • Marital status:      Spouse name: Not on file   • Number of children: Not on file   • Years of education: Not on file   • Highest education level: Not on file   Occupational History   • Not on file   Tobacco Use   • Smoking status: Former Smoker     Packs/day: 0.25     Years: 20.00     Pack years: 5.00     Types: Cigarettes   • Smokeless tobacco: Never Used   • Tobacco comment: Quit around age 41.   Vaping Use   • Vaping Use: Never used   Substance and Sexual Activity   • Alcohol use: Not Currently     Comment: Very rarely, on special occasions   • Drug use: Never   • Sexual activity: Not on file   Other Topics Concern   • Not on file   Social History Narrative   • Not on file "     Social Determinants of Health     Financial Resource Strain:    • Difficulty of Paying Living Expenses:    Food Insecurity:    • Worried About Running Out of Food in the Last Year:    • Ran Out of Food in the Last Year:    Transportation Needs:    • Lack of Transportation (Medical):    • Lack of Transportation (Non-Medical):    Physical Activity:    • Days of Exercise per Week:    • Minutes of Exercise per Session:    Stress:    • Feeling of Stress :    Social Connections:    • Frequency of Communication with Friends and Family:    • Frequency of Social Gatherings with Friends and Family:    • Attends [employer] Services:    • Active Member of Clubs or Organizations:    • Attends Club or Organization Meetings:    • Marital Status:    Intimate Partner Violence:    • Fear of Current or Ex-Partner:    • Emotionally Abused:    • Physically Abused:    • Sexually Abused:        ALLERGIES:   No Known Allergies     CURRENT MEDICATIONS:     Current Outpatient Medications:   •  aspirin EC (ECOTRIN) 81 MG Tablet Delayed Response, Take 81 mg by mouth every day., Disp: , Rfl:   •  Soft Lens Products (SENSITIVE EYES SALINE) Solution, Administer 1-2 Drops into both eyes 1 time a day as needed (Dry eyes)., Disp: , Rfl:   •  Cyanocobalamin (VITAMIN B-12) 1000 MCG Tab, Take 1,000 mcg by mouth every day., Disp: , Rfl:   •  MENTHOL MT, Take 2 Sprays by mouth as needed (Sore throat)., Disp: , Rfl:   •  aspirin (ASA) 325 MG Tab, Take 325 mg by mouth every 6 hours as needed for Mild Pain., Disp: , Rfl:   •  amitriptyline (ELAVIL) 10 MG Tab, Take 10 mg by mouth at bedtime as needed for Sleep., Disp: , Rfl:   •  lisinopril (PRINIVIL) 5 MG Tab, Take 5 mg by mouth every morning., Disp: , Rfl:   •  metoprolol SR (TOPROL XL) 100 MG TABLET SR 24 HR, at bedtime., Disp: , Rfl:   •  metoprolol SR (TOPROL XL) 50 MG TABLET SR 24 HR, every morning., Disp: , Rfl:   •  SPIRIVA HANDIHALER 18 MCG Cap, Place 18 mcg into inhaler and inhale every day.,  Disp: , Rfl:      LABS REVIEWED:  Lab Results   Component Value Date/Time    SODIUM 138 09/20/2021 07:40 AM    POTASSIUM 4.3 09/20/2021 07:40 AM    CHLORIDE 104 09/20/2021 07:40 AM    CO2 24 09/20/2021 07:40 AM    GLUCOSE 96 09/20/2021 07:40 AM    BUN 21 09/20/2021 07:40 AM    CREATININE 0.82 09/20/2021 07:40 AM    CREATININE 0.8 11/07/2005 05:25 AM      Lab Results   Component Value Date/Time    PROTHROMBTM 13.5 09/20/2021 07:40 AM    INR 1.06 09/20/2021 07:40 AM      Lab Results   Component Value Date/Time    WBC 7.1 09/20/2021 07:40 AM    RBC 4.77 09/20/2021 07:40 AM    HEMOGLOBIN 15.6 09/20/2021 07:40 AM    HEMATOCRIT 47.0 09/20/2021 07:40 AM    MCV 98.5 (H) 09/20/2021 07:40 AM    MCH 32.7 09/20/2021 07:40 AM    MCHC 33.2 (L) 09/20/2021 07:40 AM    MPV 9.0 09/20/2021 07:40 AM    NEUTSPOLYS 75.7 (H) 11/07/2005 05:25 AM    LYMPHOCYTES 15.0 (L) 11/07/2005 05:25 AM    MONOCYTES 5.0 11/07/2005 05:25 AM    EOSINOPHILS 3.9 11/07/2005 05:25 AM    BASOPHILS 0.4 11/07/2005 05:25 AM        IMAGING REVIEWED AND INTERPRETED:    ECHOCARDIOGRAM: ***    ANGIOGRAM: ***    REVIEW OF SYSTEMS:   ROS      PHYSICAL EXAMINATION:   Physical Exam  There were no vitals taken for this visit.       IMPRESSION:  ***      PLAN:  I recommend ***  The procedure, its risks, benefits, potential complications and alternative treatments were discussed with the patient in detail including the risks should he decide not to undergo my recommended treatment. All of his questions were answered to his satisfaction and he is willing to proceed with the operation. The risks include death, stroke,  infection: to include a rare bacterial infection related to the use of the heart/lung machine, sharri-operative myocardial infarction, dysrhythmias, diaphragmatic paralysis, chest wall paresthesia, tracheostomy, kidney or other organ failure, possible return to the operating room for bleeding, bleeding requiring transfusion with its attendant risks including AIDS  or hepatitis, dehiscence of surgical incisions, respiratory complications including the need for prolonged ventilator support, Protamine or other drug reaction, peripheral neuropathy, loss of limb, and miscount of surgical items. The operative mortality risk is approximately ***%. The STS mortality risk score is ***% and the morbidity and mortality risk score is ***%. The scores were discussed with patient.    The operation,*** is scheduled for*** at *** AM at ***  [facility].    Findings and recommendations have been discussed with the patient’s cardiologist ***.  Thank you for this very challenging consultation and participation in the patient’s care.  I will keep you apprised of all future developments.          Sincerely,       {CTSurgeon:[id number]}.

## 2021-09-27 NOTE — PROGRESS NOTES
Pt's daughter Katharine contacted in f/u after rt renal mass biopsy and cryoablation with Dr. Silvestre. Had some intermittent fatigue post procedure. Took Advil 2 per day after the procedure for pain. Both fatigue and pain are better now. Denies gross hematuria.    Explained that pathology shows RCC, pending outside testing. Will discuss final results with daughter when they are available. Follow up plan is imaging in 3 months unless further pathology results change the plan. Katharine is in agreement with above. All questions answered.

## 2021-09-30 ENCOUNTER — APPOINTMENT (OUTPATIENT)
Dept: CARDIOTHORACIC SURGERY | Facility: MEDICAL CENTER | Age: 86
End: 2021-09-30
Payer: MEDICARE

## 2021-09-30 NOTE — PROGRESS NOTES
"  REFERRING PHYSICIAN: Heath Ding MD.     CONSULTING PHYSICIAN: Rose La MD, FACS.    CHIEF COMPLAINT: Incidental finding.    HISTORY OF PRESENT ILLNESS: The patient is an 86 y.o. male with a history of hypertension, atrial fibrillation, hyperlipidemia, recent (June) hospitalization for a kidney infection (CT scan showed renal carcinoma, new finding), recent ablation of the renal carcinoma.  During that hospitalization CT scans were done and an ascending aneurysm was found.  He is asymptomatic.  He denies fatigue, shortness of breath, chest pain, dizziness or syncope.  He is here today for evaluation of his ascending aortic aneurysm.  He is here today for evaluation of his ascending aortic aneurysm.    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   • CHOLECYSTECTOMY      Around age 35-40.        ALLERGIES: No Known Allergies     CURRENT MEDICATIONS:   Current Outpatient Medications:   •  atorvastatin (LIPITOR) 40 MG Tab, Take  by mouth. Take 1 tablet by mouth every night at bedtime, Disp: , Rfl:   •  aspirin EC (ECOTRIN) 81 MG Tablet Delayed Response, Take 81 mg by mouth every day., Disp: , Rfl:   •  Soft Lens Products (SENSITIVE EYES SALINE) Solution, Administer 1-2 Drops into both eyes 1 time a day as needed (Dry eyes)., Disp: , Rfl:   •  Cyanocobalamin " (VITAMIN B-12) 1000 MCG Tab, Take 1,000 mcg by mouth every day., Disp: , Rfl:   •  MENTHOL MT, Take 2 Sprays by mouth as needed (Sore throat)., Disp: , Rfl:   •  aspirin (ASA) 325 MG Tab, Take 325 mg by mouth every 6 hours as needed for Mild Pain., Disp: , Rfl:   •  amitriptyline (ELAVIL) 10 MG Tab, Take 10 mg by mouth at bedtime as needed for Sleep., Disp: , Rfl:   •  lisinopril (PRINIVIL) 5 MG Tab, Take 5 mg by mouth every morning., Disp: , Rfl:   •  metoprolol SR (TOPROL XL) 100 MG TABLET SR 24 HR, at bedtime., Disp: , Rfl:   •  metoprolol SR (TOPROL XL) 50 MG TABLET SR 24 HR, every morning., Disp: , Rfl:   •  SPIRIVA HANDIHALER 18 MCG Cap, Place 18 mcg into inhaler and inhale every day., Disp: , Rfl:     FAMILY HISTORY: History reviewed. No pertinent family history.     SOCIAL HISTORY:   Social History     Socioeconomic History   • Marital status:      Spouse name: Not on file   • Number of children: Not on file   • Years of education: Not on file   • Highest education level: Not on file   Occupational History   • Not on file   Tobacco Use   • Smoking status: Former Smoker     Packs/day: 0.25     Years: 20.00     Pack years: 5.00     Types: Cigarettes   • Smokeless tobacco: Never Used   • Tobacco comment: Quit around age 41.   Vaping Use   • Vaping Use: Never used   Substance and Sexual Activity   • Alcohol use: Not Currently     Comment: Very rarely, on special occasions   • Drug use: Never   • Sexual activity: Not on file   Other Topics Concern   • Not on file   Social History Narrative   • Not on file     Social Determinants of Health     Financial Resource Strain:    • Difficulty of Paying Living Expenses:    Food Insecurity:    • Worried About Running Out of Food in the Last Year:    • Ran Out of Food in the Last Year:    Transportation Needs:    • Lack of Transportation (Medical):    • Lack of Transportation (Non-Medical):    Physical Activity:    • Days of Exercise per Week:    • Minutes of  "Exercise per Session:    Stress:    • Feeling of Stress :    Social Connections:    • Frequency of Communication with Friends and Family:    • Frequency of Social Gatherings with Friends and Family:    • Attends [employer] Services:    • Active Member of Clubs or Organizations:    • Attends Club or Organization Meetings:    • Marital Status:    Intimate Partner Violence:    • Fear of Current or Ex-Partner:    • Emotionally Abused:    • Physically Abused:    • Sexually Abused:        REVIEW OF SYSTEMS:  Review of Systems   Constitutional: Negative.    HENT: Negative.    Eyes: Negative.    Cardiovascular: Negative.    Gastrointestinal: Negative.    Genitourinary: Negative.    Musculoskeletal: Negative.    Skin: Negative.    Neurological: Negative.    Endo/Heme/Allergies: Negative.    Psychiatric/Behavioral: Negative.      PHYSICAL EXAMINATION:    /68 (BP Location: Left arm, Patient Position: Sitting, BP Cuff Size: Adult)   Pulse 68   Temp 36.2 °C (97.2 °F) (Temporal)   Ht 1.702 m (5' 7\")   Wt 92.8 kg (204 lb 9.6 oz)   SpO2 98%   BMI 32.04 kg/m².      Constitutional:       Appearance: Normal appearance.   HENT:      Head: Normocephalic and atraumatic.   Eyes:      Pupils: Pupils are equal, round, and reactive to light.   Cardiovascular:      Rate and Rhythm: Normal rate and regular rhythm.      Heart sounds: Murmur heard.   Pulmonary:      Effort: Pulmonary effort is normal.      Breath sounds: Normal breath sounds.   Abdominal:      General: Abdomen is flat.   Musculoskeletal:         General: Normal range of motion.      Cervical back: Normal range of motion.      Right lower leg: no edema present.      Left lower leg: No edema.   Skin:     General: Skin is warm and dry.   Neurological:      General: No focal deficit present.      Mental Status: He is alert and oriented to person, place, and time.   Psychiatric:         Mood and Affect: Mood normal.         Behavior: Behavior normal.         Thought " Content: Thought content normal.         Judgment: Judgment normal.     LABS REVIEWED:  Lab Results   Component Value Date/Time    SODIUM 138 09/20/2021 07:40 AM    POTASSIUM 4.3 09/20/2021 07:40 AM    CHLORIDE 104 09/20/2021 07:40 AM    CO2 24 09/20/2021 07:40 AM    GLUCOSE 96 09/20/2021 07:40 AM    BUN 21 09/20/2021 07:40 AM    CREATININE 0.82 09/20/2021 07:40 AM    CREATININE 0.8 11/07/2005 05:25 AM      Lab Results   Component Value Date/Time    PROTHROMBTM 13.5 09/20/2021 07:40 AM    INR 1.06 09/20/2021 07:40 AM      Lab Results   Component Value Date/Time    WBC 7.1 09/20/2021 07:40 AM    RBC 4.77 09/20/2021 07:40 AM    HEMOGLOBIN 15.6 09/20/2021 07:40 AM    HEMATOCRIT 47.0 09/20/2021 07:40 AM    MCV 98.5 (H) 09/20/2021 07:40 AM    MCH 32.7 09/20/2021 07:40 AM    MCHC 33.2 (L) 09/20/2021 07:40 AM    MPV 9.0 09/20/2021 07:40 AM    NEUTSPOLYS 75.7 (H) 11/07/2005 05:25 AM    LYMPHOCYTES 15.0 (L) 11/07/2005 05:25 AM    MONOCYTES 5.0 11/07/2005 05:25 AM    EOSINOPHILS 3.9 11/07/2005 05:25 AM    BASOPHILS 0.4 11/07/2005 05:25 AM        IMAGING REVIEWED AND INTERPRETED:    ECHOCARDIOGRAM [facility] 6/27/2021:    1. Left ventricular systolic function is mildly decreased with ejection fraction 40 to 45% by Phelps's biplane. There are multiple regional wall motion abnormalities with hypokinetic inferior and inferoseptum. Grade I diastolic dysfunction with low LV filling pressures.    2. The aortic root and arch are dilated; Sinus of Valsalva 4.5 cm, sinotubular junction 4.2 cm, ascending aorta 5.1 cm, and transverse aorta 4.0 cm. There is mild aortic insufficiency. Suggest CT scan for further evaluate.    3. Papillochordal dysfunction with mild mitral regurgitation.    4. Right ventricular size, thickness, and function are preserved. TAPSE and S values are normal at 1.6 cm and 18 cm/s, respectively. Estimated RVSP of 32 mmHg (assuming RA pressure = 3 mmHg) is normal.    5. Small circumferential pericardial effusion.  No  evidence of hemodynamic compromise noted.     8/23/21 Stress Test  1. Negative pharmacological stress test for infarction.  Evidence for mild reversible ischemia seen in the inferior wall.    2. Normal left ventricular systolic function with a calculated ejection fraction of 32 % with global hypokinesis    3. No chest pain and no ischemic EKG changes seen with pharmacologic stress.    4. Patient remained in atrial fibrillation throughout study with rapid rate.  Rates varied from 109-129 bpm which may be contributing to cardiomyopathy.     ANGIOGRAM: None    CTA AORTA [facility] 8/30/2021:  Ascending aorta 5.8 cm  Moderate pericardial effusion.   Right kidney 3.5 cm mass suspicious for renal cell carcinoma    IMPRESSION:  Ascending aortic aneurysm (4.9 cm), mild aortic regurgitation, dilated cardiomyopathy, atrial fibrillation, right renal cell carcinoma, hypertension.    PLAN:  Multiple measurements of the aneurysm at non-artifact regions showed it to be less than 5 cm.  This is an incidental finding without prior measurements to compare it to.  Considering his age and comorbidities, I recommend conservative treatment with continued medical management.  A CTA chest should be repeated in a year.    The procedure is a sending aortic aneurysm repair), its risks, benefits, potential complications and alternative treatments were discussed with the patient and his daughter in detail.    Findings and recommendations have been discussed with the patient’s cardiologist, Heath Ding MD.  Thank you for this very challenging consultation and participation in the patient’s care.  I will keep you apprised of all future developments.    Sincerely,    Rose La MD, FACS.

## 2021-10-07 ENCOUNTER — OFFICE VISIT (OUTPATIENT)
Dept: CARDIOTHORACIC SURGERY | Facility: MEDICAL CENTER | Age: 86
End: 2021-10-07
Payer: MEDICARE

## 2021-10-07 VITALS
DIASTOLIC BLOOD PRESSURE: 68 MMHG | OXYGEN SATURATION: 98 % | TEMPERATURE: 97.2 F | HEIGHT: 67 IN | SYSTOLIC BLOOD PRESSURE: 102 MMHG | HEART RATE: 68 BPM | BODY MASS INDEX: 32.11 KG/M2 | WEIGHT: 204.6 LBS

## 2021-10-07 DIAGNOSIS — I71.21 ASCENDING AORTIC ANEURYSM (HCC): ICD-10-CM

## 2021-10-07 PROCEDURE — 99205 OFFICE O/P NEW HI 60 MIN: CPT | Performed by: THORACIC SURGERY (CARDIOTHORACIC VASCULAR SURGERY)

## 2021-10-07 RX ORDER — ATORVASTATIN CALCIUM 40 MG/1
TABLET, FILM COATED ORAL
COMMUNITY
Start: 2021-09-17 | End: 2021-12-13 | Stop reason: SDUPTHER

## 2021-10-07 ASSESSMENT — ENCOUNTER SYMPTOMS
CONSTITUTIONAL NEGATIVE: 1
CARDIOVASCULAR NEGATIVE: 1
NEUROLOGICAL NEGATIVE: 1
GASTROINTESTINAL NEGATIVE: 1
MUSCULOSKELETAL NEGATIVE: 1
PSYCHIATRIC NEGATIVE: 1
EYES NEGATIVE: 1

## 2021-11-15 DIAGNOSIS — C64.1 RENAL CELL CARCINOMA, RIGHT (HCC): Primary | ICD-10-CM

## 2021-11-18 ENCOUNTER — OFFICE VISIT (OUTPATIENT)
Dept: INTERNAL MEDICINE | Facility: OTHER | Age: 86
End: 2021-11-18
Payer: MEDICARE

## 2021-11-18 VITALS
BODY MASS INDEX: 32.65 KG/M2 | HEART RATE: 80 BPM | DIASTOLIC BLOOD PRESSURE: 76 MMHG | WEIGHT: 208 LBS | HEIGHT: 67 IN | SYSTOLIC BLOOD PRESSURE: 122 MMHG | TEMPERATURE: 97.3 F | OXYGEN SATURATION: 95 %

## 2021-11-18 DIAGNOSIS — I77.810 THORACIC AORTIC ECTASIA (HCC): ICD-10-CM

## 2021-11-18 DIAGNOSIS — G89.29 CHRONIC NONINTRACTABLE HEADACHE, UNSPECIFIED HEADACHE TYPE: ICD-10-CM

## 2021-11-18 DIAGNOSIS — I48.91 ATRIAL FIBRILLATION, UNSPECIFIED TYPE (HCC): ICD-10-CM

## 2021-11-18 DIAGNOSIS — C64.1 RENAL CELL CARCINOMA OF RIGHT KIDNEY (HCC): ICD-10-CM

## 2021-11-18 DIAGNOSIS — R51.9 CHRONIC NONINTRACTABLE HEADACHE, UNSPECIFIED HEADACHE TYPE: ICD-10-CM

## 2021-11-18 DIAGNOSIS — F32.A DEPRESSION, UNSPECIFIED DEPRESSION TYPE: ICD-10-CM

## 2021-11-18 DIAGNOSIS — I10 PRIMARY HYPERTENSION: ICD-10-CM

## 2021-11-18 DIAGNOSIS — Z23 NEED FOR VACCINATION: ICD-10-CM

## 2021-11-18 PROCEDURE — G0008 ADMIN INFLUENZA VIRUS VAC: HCPCS | Performed by: INTERNAL MEDICINE

## 2021-11-18 PROCEDURE — 90662 IIV NO PRSV INCREASED AG IM: CPT | Performed by: INTERNAL MEDICINE

## 2021-11-18 PROCEDURE — 99214 OFFICE O/P EST MOD 30 MIN: CPT | Mod: 25,GC | Performed by: STUDENT IN AN ORGANIZED HEALTH CARE EDUCATION/TRAINING PROGRAM

## 2021-11-18 RX ORDER — MIRTAZAPINE 7.5 MG/1
7.5 TABLET, FILM COATED ORAL
Qty: 30 TABLET | Refills: 3 | Status: SHIPPED | OUTPATIENT
Start: 2021-11-18 | End: 2021-12-23 | Stop reason: SDUPTHER

## 2021-11-18 ASSESSMENT — ENCOUNTER SYMPTOMS
FEVER: 0
BLURRED VISION: 0
HEADACHES: 1
DEPRESSION: 1
CHILLS: 0
ABDOMINAL PAIN: 0
WEIGHT LOSS: 1
WEAKNESS: 0
DIZZINESS: 0
PALPITATIONS: 0
VOMITING: 0
NAUSEA: 0
SHORTNESS OF BREATH: 0

## 2021-11-18 ASSESSMENT — PATIENT HEALTH QUESTIONNAIRE - PHQ9: CLINICAL INTERPRETATION OF PHQ2 SCORE: 0

## 2021-11-18 NOTE — PROGRESS NOTES
"Chief Complaint   Patient presents with   • Follow-Up       HISTORY OF PRESENT ILLNESS: Patient is a 86 y.o. male established patient who presents today for the following: follow up.    Patient's daughter, Katharine, present to aid in translation. Patient recently underwent R renal mass cryoablation and biopsy on 9/20/21 without complications. He saw cardiothoracic vascular surgery regarding his aortic aneurysm and was recommended to continue with medical management and reassess in a year. Patient is being followed by cardiology and urology, with follow up appointments in December.    Currently, daughter states that patient still feels weak and has noticeable weight loss. She said that he has not been sleeping well, not eating as much, and has been depressed because his wife's condition is progressing. Daughter also reports patient having episodes of anger outbursts, described as a \"erupting like a volcano\". Patient has not yet undergone sleep study because daughter is primary caregiver and has been focusing on mother's medical needs. Patient still has a constant headache, mildly improved, and relieved with Advil 500mg. Other than that, patient's blood pressure has been better controlled and patient denies chest pain, shortness of breath, abdominal pain, nausea, vomiting, weakness, falls or LOC.      Past Medical History:   Diagnosis Date   • AAA (abdominal aortic aneurysm) (Formerly [facility])     9/7/21- per patients daughter.   • Arrhythmia    • Arthritis 09/07/2021    Lumbar area and shoulders.   • Cataract     Bilateral IOL's.   • COPD (chronic obstructive pulmonary disease) (Formerly [facility])     9/7/2021 - states pt not diagnosed with COPD, pulmonary referrel pending, uses inhaler daily at this time.   • Dental disorder     Several teeth missing/pulled.   • High cholesterol 09/07/2021    No medication at this time, daughter will follow up with PCP.   • History of UTI    • Hypertension    • Psychiatric problem 09/07/2021    Bipolar - no " medication.   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       There are no problems to display for this patient.      Allergies:Patient has no known allergies.    Current Outpatient Medications   Medication Sig Dispense Refill   • atorvastatin (LIPITOR) 40 MG Tab Take  by mouth. Take 1 tablet by mouth every night at bedtime     • aspirin EC (ECOTRIN) 81 MG Tablet Delayed Response Take 81 mg by mouth every day.     • Soft Lens Products (SENSITIVE EYES SALINE) Solution Administer 1-2 Drops into both eyes 1 time a day as needed (Dry eyes).     • Cyanocobalamin (VITAMIN B-12) 1000 MCG Tab Take 1,000 mcg by mouth every day.     • MENTHOL MT Take 2 Sprays by mouth as needed (Sore throat).     • aspirin (ASA) 325 MG Tab Take 325 mg by mouth every 6 hours as needed for Mild Pain.     • amitriptyline (ELAVIL) 10 MG Tab Take 10 mg by mouth at bedtime as needed for Sleep.     • lisinopril (PRINIVIL) 5 MG Tab Take 5 mg by mouth every morning.     • metoprolol SR (TOPROL XL) 100 MG TABLET SR 24 HR at bedtime.     • metoprolol SR (TOPROL XL) 50 MG TABLET SR 24 HR every morning.     • SPIRIVA HANDIHALER 18 MCG Cap Place 18 mcg into inhaler and inhale every day.       No current facility-administered medications for this visit.       Social History     Tobacco Use   • Smoking status: Former Smoker     Packs/day: 0.25     Years: 20.00     Pack years: 5.00     Types: Cigarettes   • Smokeless tobacco: Never Used   • Tobacco comment: Quit around age 41.   Vaping Use   • Vaping Use: Never used   Substance Use Topics   • Alcohol use: Not Currently     Comment: Very rarely, on special occasions   • Drug use: Never       No family history on file.      Review of Systems   Review of Systems   Constitutional: Positive for malaise/fatigue and weight loss. Negative for chills and fever.   Eyes: Negative for blurred vision.   Respiratory: Negative for shortness of breath.    Cardiovascular: Negative for chest pain and  "palpitations.   Gastrointestinal: Negative for abdominal pain, nausea and vomiting.   Neurological: Positive for headaches. Negative for dizziness and weakness.   Psychiatric/Behavioral: Positive for depression. Negative for suicidal ideas.       Exam:  /76   Pulse 80   Temp 36.3 °C (97.3 °F)   Ht 1.702 m (5' 7\")   Wt 94.3 kg (208 lb)   SpO2 95%  Body mass index is 32.58 kg/m².  Constitutional:  Not in acute distress, well appearing.  HEENT:   NC/AT  Cardiovascular: Regular rate and rhythm. No murmurs or gallops.      Lungs:   Clear to auscultation bilaterally. No wheezes or crackles.  Abdomen: Not distended, soft, not tender. No guarding or rigidity. No masses.  Extremities:  No cyanosis/clubbing/edema. No obvious deformities.  Skin:  Warm and dry.  No visible rashes.  Neurologic: Alert & oriented x 3, strength and sensation grossly intact.  No focal deficits noted.  Psychiatric:  No SI/HI.    Assessment/Plan:     1. Primary hypertension  BP in clinic today looks good at 122/76 mmHg  Does not check BP regularly at home  Denies chest pain, palpitation, shortness of breath  On lisinopril 5 mg daily and metoprolol 150 mg daily    -Continue current medications  -Advised healthy diet and moderate exercise    2. Depression, unspecified depression type  Daughter reports patient not eating well with weight loss, not sleeping throughout the night and sometimes sleeping throughout the day, fatigued  Wife's medical condition progressing  Patient does not want to do therapy  Denies SI/HI    -Will try mirtazepine 7.5mg QHS, may help with depression, sleep and appetite  -Monitor    3. Atrial fibrillation, unspecified type (HCC)  Diagnosed June 2021  Follows with cardiology (Dr. Ding), has appt 12/2021  Anticoagulated with ASA and rate controlled with metoprolol 150mg    -Advised to keep appointment with cardiology to reassess metoprolol dosage    4. Thoracic aortic ectasia (HCC)  Incidental finding during echo: 5.1 " cm aorta  Cardiothoracic vascular surgery recommended to continue with medical management and reassess in a year    -Optimize BP control    5. Chronic nonintractable headache, unspecified headache type  Trial of amitriptyline did not help  Patient takes Advil 500mg, which provides relief   May be 2/2 undiagnosed KATRIN    -Stop amitriptyline  -Advised to schedule sleep study    6. 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 Harding) and has an appt 12/2021    -Keep appointment with urology  -Monitor    7. Need for vaccination  - Influenza Vaccine, High Dose (65+ Only)      All imaging results and lab results and consult notes are reviewed at this visit.  Followup: Return in about 6 months (around 5/18/2022), or if symptoms worsen or fail to improve.    Please note that this dictation was created using voice recognition software. I have made every reasonable attempt to correct obvious errors, but I expect that there are errors of grammar and possibly content that I did not discover before finalizing the note.      Daina Suero, PGY-1  Internal Medicine

## 2021-11-19 NOTE — PROGRESS NOTES
"Sammy Yanez is a 86 y.o. male here for a non-provider visit for:   FLU    Reason for immunization: Annual Flu Vaccine  Immunization records indicate need for vaccine: Yes, confirmed with Epic  Minimum interval has been met for this vaccine: Yes  ABN completed: Not Indicated    VIS Dated  8/6/2022 was given to patient: Yes  All IAC Questionnaire questions were answered \"No.\"    Patient tolerated injection and no adverse effects were observed or reported: Yes    Pt scheduled for next dose in series: Not Indicated  "

## 2021-11-19 NOTE — PATIENT INSTRUCTIONS
Great seeing you today!  You got the flu vaccine today in the clinic  Please go to pharmacy and see if you can get your vaccines: Tdap (tetanus), shingles, and pneumococcal (penumonia)  I will send Remeron (mirtazapine) to the pharmacy, use it nightly  Please stop taking Elavil (amytriptyline)  Please try to get sleep study done in the future  Please keep appointment with cardiology and urology

## 2021-12-13 NOTE — TELEPHONE ENCOUNTER
Received request via: Pharmacy    Was the patient seen in the last year in this department? Yes  Last seen 11/18/2021 next 01/19/2022  Does the patient have an active prescription (recently filled or refills available) for medication(s) requested? No

## 2021-12-14 RX ORDER — ATORVASTATIN CALCIUM 40 MG/1
40 TABLET, FILM COATED ORAL DAILY
Qty: 90 TABLET | Refills: 1 | Status: SHIPPED | OUTPATIENT
Start: 2021-12-14 | End: 2022-04-26 | Stop reason: SDUPTHER

## 2021-12-23 NOTE — TELEPHONE ENCOUNTER
Last seen: 11/18/2021 by Dr. Suero  Next appt: 05/19/2022 with Dr. Suero    Was the patient seen in the last year in this department? Yes   Does patient have an active prescription for medications requested? No   Received Request Via: Pharmacy

## 2022-01-04 RX ORDER — MIRTAZAPINE 7.5 MG/1
7.5 TABLET, FILM COATED ORAL
Qty: 30 TABLET | Refills: 3 | Status: SHIPPED | OUTPATIENT
Start: 2022-01-04 | End: 2022-08-23 | Stop reason: SDUPTHER

## 2022-01-19 ENCOUNTER — HOSPITAL ENCOUNTER (OUTPATIENT)
Dept: RADIOLOGY | Facility: MEDICAL CENTER | Age: 87
End: 2022-01-19
Attending: NURSE PRACTITIONER
Payer: MEDICARE

## 2022-01-19 DIAGNOSIS — C64.1 RENAL CELL CARCINOMA, RIGHT (HCC): ICD-10-CM

## 2022-01-19 PROCEDURE — 700117 HCHG RX CONTRAST REV CODE 255: Performed by: NURSE PRACTITIONER

## 2022-01-19 PROCEDURE — 74183 MRI ABD W/O CNTR FLWD CNTR: CPT | Mod: ME

## 2022-01-19 PROCEDURE — A9576 INJ PROHANCE MULTIPACK: HCPCS | Performed by: NURSE PRACTITIONER

## 2022-01-19 RX ADMIN — GADOTERIDOL 20 ML: 279.3 INJECTION, SOLUTION INTRAVENOUS at 17:24

## 2022-01-20 NOTE — PROGRESS NOTES
Telephone Appointment Visit   As a means of avoiding spread of COVID-19, this visit is being conducted by telephone. This telephone visit was initiated by the patient and they verbally consented. Jackson Silvestre MD present for 4 minutes of the telemedicine visit.    Time at start of call: 1150    Reason for Call:  imaging follow up    HPI:    Sammy Yanez was initially referred by Dano Harding MD. He is a 86 y.o. male seen in clinic for evaluation and possible intervention of a 3.1 cm T1a RIGHT renal mass. He is also under the care of Daina Suero MD and his anticoagulation is managed by Heath Ding MD.     History of Present Illness:   presented with gross hematuria after starting anticoagulants for atrial fibrillation. Workup revealed a normal bladder and a large prostate with no source of bleeding and it was determined to be secondary to his medication. Additional imaging revealed an incidental right renal mass 3.1 cm in size. He underwent uncomplicated biopsy and cryoablation of the renal mass with Jackson Silvestre MD on Sept 20, 2021. The biopsy was positive for RCC. He is seen today to review follow up imaging after the procedure. His appointment is with his daughter Katharine, who is translating Taiwanese at his request. Today, she reports he has chronic spine pain from arthritis and he is not moving around as much as he used to, so he has some deconditioning. She denies pain at the right kidney procedure area and states he has not reported gross hematuria.     Labs / Images Reviewed:   Labs:      Ref. Range 9/20/2021 07:40   WBC Latest Ref Range: 4.8 - 10.8 K/uL 7.1   RBC Latest Ref Range: 4.70 - 6.10 M/uL 4.77   Hemoglobin Latest Ref Range: 14.0 - 18.0 g/dL 15.6   Hematocrit Latest Ref Range: 42.0 - 52.0 % 47.0   MCV Latest Ref Range: 81.4 - 97.8 fL 98.5 (H)   MCH Latest Ref Range: 27.0 - 33.0 pg 32.7   MCHC Latest Ref Range: 33.7 - 35.3 g/dL 33.2 (L)   RDW Latest Ref Range: 35.9 - 50.0 fL 50.8 (H)    Platelet Count Latest Ref Range: 164 - 446 K/uL 217   MPV Latest Ref Range: 9.0 - 12.9 fL 9.0   Sodium Latest Ref Range: 135 - 145 mmol/L 138   Potassium Latest Ref Range: 3.6 - 5.5 mmol/L 4.3   Chloride Latest Ref Range: 96 - 112 mmol/L 104   Co2 Latest Ref Range: 20 - 33 mmol/L 24   Anion Gap Latest Ref Range: 7.0 - 16.0  10.0   Glucose Latest Ref Range: 65 - 99 mg/dL 96   Bun Latest Ref Range: 8 - 22 mg/dL 21   Creatinine Latest Ref Range: 0.50 - 1.40 mg/dL 0.82   GFR If  Latest Ref Range: >60 mL/min/1.73 m 2 >60   GFR If Non  Latest Ref Range: >60 mL/min/1.73 m 2 >60   Calcium Latest Ref Range: 8.5 - 10.5 mg/dL 9.1   INR Latest Ref Range: 0.87 - 1.13  1.06   PT Latest Ref Range: 12.0 - 14.6 sec 13.5        Pathology:  9/20/21, [facility]:  A. Right renal mass biopsy:          Renal cell carcinoma, with features favoring clear cell renal           cell carcinoma, WHO grade 2.      Radiology:   MRI 1/19/22 at [facility]:  1.  Post ablation changes involving lesion at the mid to lower RIGHT kidney and surrounding fat.  No focal abnormal enhancement to indicate residual or recurrent tumor.  2.  Small RIGHT kidney cysts again seen.    CT AP on 7/12/21 at [place]:           Assessment and Plan:   Impression:   1. Right renal cancer, 3 cm, status post cryoablation, with no evidence of recurrence.  2. Gross hematuria, secondary to anticoagulation.  3. Atrial fibrillation, on aspirin.  4. Chronic heart failure.   5. Hypertension.    Follow-up: There is no evidence of recurrent RCC at 3 months post cryoablation. Katharine is very pleased with the outcome. MRI in 6 months (June/July 2022), schedule with Katharine.    Time at end of call: 1154  Total Time Spent: 4 minutes    TRESSA Stark

## 2022-01-21 ENCOUNTER — HOSPITAL ENCOUNTER (OUTPATIENT)
Dept: RADIOLOGY | Facility: MEDICAL CENTER | Age: 87
End: 2022-01-21
Attending: NURSE PRACTITIONER
Payer: MEDICARE

## 2022-01-21 DIAGNOSIS — C64.1 RENAL CELL CARCINOMA, RIGHT (HCC): ICD-10-CM

## 2022-04-26 ENCOUNTER — OFFICE VISIT (OUTPATIENT)
Dept: INTERNAL MEDICINE | Facility: OTHER | Age: 87
End: 2022-04-26
Payer: MEDICARE

## 2022-04-26 VITALS
BODY MASS INDEX: 32.24 KG/M2 | HEIGHT: 67 IN | DIASTOLIC BLOOD PRESSURE: 76 MMHG | TEMPERATURE: 97.5 F | HEART RATE: 60 BPM | OXYGEN SATURATION: 95 % | SYSTOLIC BLOOD PRESSURE: 124 MMHG | WEIGHT: 205.4 LBS

## 2022-04-26 DIAGNOSIS — E78.5 HYPERLIPIDEMIA, UNSPECIFIED HYPERLIPIDEMIA TYPE: ICD-10-CM

## 2022-04-26 DIAGNOSIS — F43.21 ADJUSTMENT DISORDER WITH DEPRESSED MOOD: ICD-10-CM

## 2022-04-26 DIAGNOSIS — I10 PRIMARY HYPERTENSION: ICD-10-CM

## 2022-04-26 PROCEDURE — 99213 OFFICE O/P EST LOW 20 MIN: CPT | Mod: GE | Performed by: STUDENT IN AN ORGANIZED HEALTH CARE EDUCATION/TRAINING PROGRAM

## 2022-04-26 RX ORDER — LISINOPRIL 5 MG/1
5 TABLET ORAL EVERY MORNING
Qty: 90 TABLET | Refills: 1 | Status: SHIPPED | OUTPATIENT
Start: 2022-04-26 | End: 2022-12-12 | Stop reason: SDUPTHER

## 2022-04-26 RX ORDER — ATORVASTATIN CALCIUM 40 MG/1
40 TABLET, FILM COATED ORAL DAILY
Qty: 90 TABLET | Refills: 1 | Status: SHIPPED | OUTPATIENT
Start: 2022-04-26 | End: 2023-11-26 | Stop reason: SDUPTHER

## 2022-04-26 RX ORDER — MIRTAZAPINE 7.5 MG/1
7.5 TABLET, FILM COATED ORAL
Qty: 90 TABLET | Refills: 1 | Status: CANCELLED | OUTPATIENT
Start: 2022-04-26

## 2022-04-26 ASSESSMENT — ENCOUNTER SYMPTOMS
FEVER: 0
WEAKNESS: 1
DIARRHEA: 0
HEADACHES: 1
DIZZINESS: 0
CONSTIPATION: 0
SHORTNESS OF BREATH: 0
ABDOMINAL PAIN: 0
VOMITING: 0
NAUSEA: 0
WEIGHT LOSS: 1
PALPITATIONS: 0
DOUBLE VISION: 0
DEPRESSION: 1
BLURRED VISION: 0
COUGH: 0
CHILLS: 0

## 2022-04-26 ASSESSMENT — PATIENT HEALTH QUESTIONNAIRE - PHQ9
5. POOR APPETITE OR OVEREATING: 1 - SEVERAL DAYS
SUM OF ALL RESPONSES TO PHQ QUESTIONS 1-9: 12
CLINICAL INTERPRETATION OF PHQ2 SCORE: 4

## 2022-04-26 NOTE — PROGRESS NOTES
Established Patient    Patient Care Team:  Daina Suero M.D. as PCP - General (Internal Medicine)    HPI:  Sammy Yanez is a 86 y.o. male with past medical history of hypertension, atrial fibrillation, hyperlipidemia, ascending aortic aneurysm, renal cell carcinoma s/p ablation who presents today with the following Chief Complaint(s):   Chief Complaint   Patient presents with   • Follow-Up     Medication refills, depression     Patient was last seen in clinic on 11/18/2021 and is accompanied by his daughter, Katharine.  Patient has been taking his medication regularly and tolerating them well. There has been some issues with refilling medication at the pharmacy, thus with requests for 90 day supply.   Today, patient appears tearful and reports feeling tired. Per his daughter, patient has ongoing weakness, is not eating well, and sleeps for most of the day. He lives with his wife, who is in hospice for a chronic, terminal illness and is her primary caregiver when daughter is away at work. He has tried taking mirtazapine without notable effect of medication. He still notes chronic, daily headache but has not had a sleep study done due to focusing on wife's condition. When asked further about home health for daily check-ins and help with administering medications, patient declined. He also further declined therapy or active SI/HI.    Review of Systems   Review of Systems   Constitutional: Positive for malaise/fatigue and weight loss. Negative for chills and fever.   Eyes: Negative for blurred vision and double vision.   Respiratory: Negative for cough and shortness of breath.    Cardiovascular: Negative for chest pain and palpitations.   Gastrointestinal: Negative for abdominal pain, constipation, diarrhea, nausea and vomiting.   Neurological: Positive for weakness and headaches. Negative for dizziness.   Psychiatric/Behavioral: Positive for depression. Negative for suicidal ideas.         Past  Medical History:   Diagnosis Date   • AAA (abdominal aortic aneurysm) ([facility])     9/7/21- per patients daughter.   • Arrhythmia    • Arthritis 09/07/2021    Lumbar area and shoulders.   • Cataract     Bilateral IOL's.   • COPD (chronic obstructive pulmonary disease) ([facility])     9/7/2021 - states pt not diagnosed with COPD, pulmonary referrel pending, uses inhaler daily at this time.   • Dental disorder     Several teeth missing/pulled.   • High cholesterol 09/07/2021    No medication at this time, daughter will follow up with PCP.   • History of UTI    • Hypertension    • Psychiatric problem 09/07/2021    Bipolar - no medication.   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       There are no problems to display for this patient.      Allergies:Patient has no known allergies.    Current Outpatient Medications   Medication Sig Dispense Refill   • mirtazapine (REMERON) 7.5 MG tablet Take 1 Tablet by mouth at bedtime. 30 Tablet 3   • atorvastatin (LIPITOR) 40 MG Tab Take 1 Tablet by mouth every day. Take 1 tablet by mouth every night at bedtime 90 Tablet 1   • aspirin EC (ECOTRIN) 81 MG Tablet Delayed Response Take 81 mg by mouth every day.     • Soft Lens Products (SENSITIVE EYES SALINE) Solution Administer 1-2 Drops into both eyes 1 time a day as needed (Dry eyes).     • Cyanocobalamin (VITAMIN B-12) 1000 MCG Tab Take 1,000 mcg by mouth every day.     • aspirin (ASA) 325 MG Tab Take 325 mg by mouth every 6 hours as needed for Mild Pain.     • lisinopril (PRINIVIL) 5 MG Tab Take 5 mg by mouth every morning.     • metoprolol SR (TOPROL XL) 100 MG TABLET SR 24 HR at bedtime.     • metoprolol SR (TOPROL XL) 50 MG TABLET SR 24 HR every morning.       No current facility-administered medications for this visit.       Social History     Tobacco Use   • Smoking status: Former Smoker     Packs/day: 0.25     Years: 20.00     Pack years: 5.00     Types: Cigarettes   • Smokeless tobacco: Never Used   •  "Tobacco comment: Quit around age 41.   Vaping Use   • Vaping Use: Never used   Substance Use Topics   • Alcohol use: Not Currently     Comment: Very rarely, on special occasions   • Drug use: Never       No family history on file.      Physical Exam  Vitals:  /76 (BP Location: Left arm, Patient Position: Sitting, BP Cuff Size: Large adult)   Pulse 60   Temp 36.4 °C (97.5 °F) (Temporal)   Ht 1.702 m (5' 7\")   Wt 93.2 kg (205 lb 6.4 oz)   SpO2 95%  Body mass index is 32.17 kg/m².  Constitutional:  Not in acute distress, well appearing.  HEENT:   NC/AT. Hard of hearing  Cardiovascular: Regular rate and irregular rhythm. No murmurs or gallops.      Lungs:   Clear to auscultation bilaterally. No wheezes or crackles  Abdomen: Not distended, soft, not tender to palpation  Extremities:  No cyanosis/clubbing/edema. No obvious deformities.  Skin:  Warm and dry.  No visible rashes.  Neurologic: Alert & oriented x 3, CN II-XII grossly intact, no focal deficits  Psychiatric:  Depressed mood. No SI/HI      Assessment and Plan:     1. Adjustment disorder with depressed mood  PHQ-9 done today score of 12  (+)sleep disturbances, anhedonia, decreased energy and concentration problems, appetite changes and weight loss, psychomotor retardation  Denies suicidal ideation  Patient's onset of symptoms appeared after dterioration of wife's condition and had no clinical response to mirtazapine/patient declined increasing dose  -Will discontinue mirtazapine as this has no effect as patient's depression appears to be situational. Can also consider a contribution of caregiver fatigue   -Encouraged therapy/home health support, however, patient declined. He does not feel comfortable with new people and has a good support system with daughter and son. Will have patient follow up closely to address mood and provide support  -Mindfulness apps advised    2. Primary hypertension  BP in clinic today looks good at 124/76 mmHg  Patient does " not regularly check BP at home  Denies headache/dizziness, visual changes, chest pain, palpitations, shortness of breath  On lisinopril 5 mg daily metoprolol 150 mg daily  -Continue current medications, refilled lisinopril today. Metoprolol prescribed by cardiologist    3. Hyperlipidemia  -On atorvastatin 40mg daily, continue and refilled today    Return in about 8 weeks (around 2022).    Please note that this dictation was created using voice recognition software. I have made every reasonable attempt to correct obvious errors, but I expect that there are errors of grammar and possibly content that I did not discover before finalizing the note.    Total face-to-face time spent in medical decision makin min    Daina Suero M.D. PGY-1  [facility]

## 2022-04-27 NOTE — PROGRESS NOTES
Teaching Physician Attestation      Level of Participation    I discussed with the resident physician the patient's history, exam, assessment and plan in detail.  Topics listed in my addendum were the focus of the visit.  Healthcare maintenance was not addressed this visit unless listed as a topic in my addendum.  I agree with plan as written along with the following additions/modifications:      Situational Depression  -Clear inciting events of deterioration of wife's health and caregiving needs associated with this precipitated the onset of all of his symptoms including sadness, anhedonia, fatigue, loss of appetite, sleep issues, although denies psych alarm symptoms.  Patient was trialed on mirtazapine with no effect.  Patient was offered therapy but he declines.  Offered increased dose of mirtazapine, patient also declines.  Offered to provide VNA support to help with caregiver burnout related to caring for his wife, patient declines.  Patient does have a family support system in place.    Plan  -Given there is likely no biochemical imbalance given a situation is the clear inciting event, and mirtazapine is not of benefit, and patient additionally does not want to try increased dosing of mirtazapine, will discontinue  -Offered supportive apps such as [employer] which has Bengali instruction (patient Bengali-speaking)  -Ultimately patient would benefit from increased social support and coping skills, although again at this point resistant to therapy or VNA support  -We will continue to follow closely for psychological support on our end.    Hypertension  -No lightheadedness, at goal blood pressure.  Has labs upcoming with urology, will ask them to send to our clinic.  Continue lisinopril for now.    Return to clinic in 2 months.  Offered to help patient with any of above recommendations for situational depression sooner than this if you would like, he was offered to call the office should he wish for a sooner  follow-up appointment, we are happy to see him.

## 2022-04-27 NOTE — PATIENT INSTRUCTIONS
Please stop taking Mirtazapine  I have sent atorvastatin, lisinopril, and aspirin to your pharmacy  For your headache, take Tylenol  Please get the sleep study done as soon as possible  Come back to the clinic if ever you need any help/concerns

## 2022-08-11 NOTE — TELEPHONE ENCOUNTER
Mirtazapine Refill    Last seen: 4/26/22 by Dr. Suero  Next appt: None    Was the patient seen in the last year in this department? Yes   Does patient have an active prescription for medications requested? No   Received Request Via: Pharmacy

## 2022-08-12 RX ORDER — MIRTAZAPINE 7.5 MG/1
TABLET, FILM COATED ORAL
Qty: 90 TABLET | Refills: 1 | OUTPATIENT
Start: 2022-08-12

## 2022-08-23 ENCOUNTER — TELEPHONE (OUTPATIENT)
Dept: PEDIATRICS | Facility: PHYSICIAN GROUP | Age: 87
End: 2022-08-23
Payer: MEDICARE

## 2022-08-23 DIAGNOSIS — E78.00 PURE HYPERCHOLESTEROLEMIA: ICD-10-CM

## 2022-08-23 DIAGNOSIS — F32.9 REACTIVE DEPRESSION: ICD-10-CM

## 2022-08-23 PROBLEM — I71.21 ASCENDING AORTIC ANEURYSM (HCC): Status: ACTIVE | Noted: 2021-12-10

## 2022-08-23 PROBLEM — I10 ESSENTIAL HYPERTENSION: Status: ACTIVE | Noted: 2021-08-12

## 2022-08-23 PROBLEM — I48.91 UNSPECIFIED ATRIAL FIBRILLATION (HCC): Status: ACTIVE | Noted: 2021-07-07

## 2022-08-23 RX ORDER — MIRTAZAPINE 7.5 MG/1
7.5 TABLET, FILM COATED ORAL
Qty: 90 TABLET | Refills: 1 | Status: SHIPPED | OUTPATIENT
Start: 2022-08-23 | End: 2022-09-19 | Stop reason: DRUGHIGH

## 2022-08-23 NOTE — TELEPHONE ENCOUNTER
Daughter walked into clinic to get refill Mirtazepine. Last visit it was thought the meds was not helping, and was decided to stop it. Pt has run out- and it is clear the med WAS helping. Praveen wishes to resume. Pt's wife at very end stages with hospice.   Will continue current dose- but have pt f/u to see if increase needed.

## 2022-09-19 ENCOUNTER — TELEPHONE (OUTPATIENT)
Dept: INTERNAL MEDICINE | Facility: OTHER | Age: 87
End: 2022-09-19

## 2022-09-19 ENCOUNTER — OFFICE VISIT (OUTPATIENT)
Dept: INTERNAL MEDICINE | Facility: OTHER | Age: 87
End: 2022-09-19
Payer: MEDICARE

## 2022-09-19 VITALS
TEMPERATURE: 98.2 F | HEART RATE: 58 BPM | WEIGHT: 217 LBS | HEIGHT: 67 IN | OXYGEN SATURATION: 95 % | SYSTOLIC BLOOD PRESSURE: 119 MMHG | DIASTOLIC BLOOD PRESSURE: 76 MMHG | BODY MASS INDEX: 34.06 KG/M2

## 2022-09-19 DIAGNOSIS — F32.9 MAJOR DEPRESSIVE DISORDER WITH CURRENT ACTIVE EPISODE, UNSPECIFIED DEPRESSION EPISODE SEVERITY, UNSPECIFIED WHETHER RECURRENT: ICD-10-CM

## 2022-09-19 DIAGNOSIS — E78.5 HYPERLIPIDEMIA, UNSPECIFIED HYPERLIPIDEMIA TYPE: ICD-10-CM

## 2022-09-19 DIAGNOSIS — I10 ESSENTIAL HYPERTENSION: ICD-10-CM

## 2022-09-19 PROCEDURE — 99214 OFFICE O/P EST MOD 30 MIN: CPT | Mod: GC | Performed by: STUDENT IN AN ORGANIZED HEALTH CARE EDUCATION/TRAINING PROGRAM

## 2022-09-19 RX ORDER — DEXAMETHASONE SODIUM PHOSPHATE 4 MG/ML
INJECTION, SOLUTION INTRA-ARTICULAR; INTRALESIONAL; INTRAMUSCULAR; INTRAVENOUS; SOFT TISSUE
COMMUNITY
End: 2022-09-19

## 2022-09-19 RX ORDER — ASPIRIN 81 MG/1
TABLET, CHEWABLE ORAL
COMMUNITY
End: 2022-09-19

## 2022-09-19 RX ORDER — AMITRIPTYLINE HYDROCHLORIDE 10 MG/1
TABLET, FILM COATED ORAL
COMMUNITY
End: 2022-12-12

## 2022-09-19 RX ORDER — MIRTAZAPINE 15 MG/1
15 TABLET, FILM COATED ORAL NIGHTLY
Qty: 30 TABLET | Refills: 3 | Status: SHIPPED | OUTPATIENT
Start: 2022-09-19 | End: 2022-12-12 | Stop reason: SDUPTHER

## 2022-09-19 RX ORDER — SOFT LENS RINSE,STORE SOLUTION
1-2 SOLUTION, NON-ORAL MISCELLANEOUS
Qty: 237 ML | Refills: 1 | Status: SHIPPED | OUTPATIENT
Start: 2022-09-19 | End: 2023-11-26

## 2022-09-19 RX ORDER — MIDAZOLAM HYDROCHLORIDE 1 MG/ML
INJECTION INTRAMUSCULAR; INTRAVENOUS
COMMUNITY
End: 2022-09-19

## 2022-09-19 RX ORDER — IBUPROFEN 800 MG/1
TABLET ORAL
COMMUNITY
End: 2022-09-19

## 2022-09-19 RX ORDER — METOPROLOL SUCCINATE 50 MG/1
50 TABLET, EXTENDED RELEASE ORAL DAILY
COMMUNITY
Start: 2022-04-29 | End: 2022-10-26

## 2022-09-19 RX ORDER — METOPROLOL SUCCINATE 100 MG/1
100 TABLET, EXTENDED RELEASE ORAL DAILY
COMMUNITY
Start: 2022-04-29 | End: 2022-09-19

## 2022-09-19 RX ORDER — SODIUM CHLORIDE, SODIUM LACTATE, POTASSIUM CHLORIDE, CALCIUM CHLORIDE 600; 310; 30; 20 MG/100ML; MG/100ML; MG/100ML; MG/100ML
INJECTION, SOLUTION INTRAVENOUS
COMMUNITY
End: 2022-09-19

## 2022-09-19 RX ORDER — PHENAZOPYRIDINE HYDROCHLORIDE 200 MG/1
TABLET, FILM COATED ORAL
COMMUNITY
End: 2022-09-19

## 2022-09-19 ASSESSMENT — ENCOUNTER SYMPTOMS
WEAKNESS: 1
WEIGHT LOSS: 0
CONSTIPATION: 0
HEADACHES: 1
NAUSEA: 0
VOMITING: 0
CHILLS: 0
PALPITATIONS: 0
SHORTNESS OF BREATH: 0
DIARRHEA: 0
COUGH: 0
ABDOMINAL PAIN: 0
FEVER: 0
DIZZINESS: 0
DEPRESSION: 1

## 2022-09-19 ASSESSMENT — PATIENT HEALTH QUESTIONNAIRE - PHQ9
SUM OF ALL RESPONSES TO PHQ QUESTIONS 1-9: 15
CLINICAL INTERPRETATION OF PHQ2 SCORE: 3
5. POOR APPETITE OR OVEREATING: 2 - MORE THAN HALF THE DAYS

## 2022-09-19 NOTE — PROGRESS NOTES
Established Patient    Patient Care Team:  Daina Suero M.D. as PCP - General (Internal Medicine)    HPI:  Sammy Yanez is a 87 y.o. male with past medical history of hypertension, atrial fibrillation (metoprolol and ASA, follows with cardiology), hyperlipidemia, ascending aortic aneurysm, renal cell carcinoma s/p ablation who presents today with the following Chief Complaint(s):   Chief Complaint   Patient presents with    Follow-Up     Med refills          Patient presents with daughter, Katharine, who aids in translation. Mirtazapine was restarted due to progressive mood changes related to wife's worsening dementia. Daughter states that patient still with depressed mood, sleep disturbances, irritability, low energy, difficulty concentrating, appetite changes (now eating more ice cream, gaining some weight), and slower movements.  Daughter states that he has been hard on her to take care of both her mother and father, further stating that patient is usually home with wife daily and is not really interested in going out. Patient continued to decline therapy, denied SI/HI.  For refill of mirtazapine and eye drops.      Review of Systems   Review of Systems   Constitutional:  Positive for malaise/fatigue. Negative for chills, fever and weight loss.   Respiratory:  Negative for cough and shortness of breath.    Cardiovascular:  Negative for chest pain, palpitations and leg swelling.   Gastrointestinal:  Negative for abdominal pain, constipation, diarrhea, nausea and vomiting.   Neurological:  Positive for weakness and headaches. Negative for dizziness.   Psychiatric/Behavioral:  Positive for depression. Negative for suicidal ideas.         (+) sleep disturbances       Past Medical History:   Diagnosis Date    AAA (abdominal aortic aneurysm) ([facility])     9/7/21- per patients daughter.    Arrhythmia     Arthritis 09/07/2021    Lumbar area and shoulders.    Cataract     Bilateral IOL's.    COPD (chronic  obstructive pulmonary disease) ([facility])     9/7/2021 - states pt not diagnosed with COPD, pulmonary referrel pending, uses inhaler daily at this time.    Dental disorder     Several teeth missing/pulled.    High cholesterol 09/07/2021    No medication at this time, daughter will follow up with PCP.    History of UTI     Hypertension     Psychiatric problem 09/07/2021    Bipolar - no medication.    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       Patient Active Problem List    Diagnosis Date Noted    Pure hypercholesterolemia 08/23/2022    Reactive depression 08/23/2022    Ascending aortic aneurysm ([facility]) 12/10/2021    Essential hypertension 08/12/2021    Unspecified atrial fibrillation ([facility]) 07/07/2021       Allergies:Patient has no known allergies.    Current Outpatient Medications   Medication Sig Dispense Refill    metoprolol SR (TOPROL XL) 50 MG TABLET SR 24 HR Take 50 mg by mouth every day.      Soft Lens Products (SENSITIVE EYES SALINE) Solution Administer 1-2 Drops into both eyes 1 time a day as needed (Dry eyes). 237 mL 1    mirtazapine (REMERON) 15 MG Tab Take 1 Tablet by mouth every evening. 30 Tablet 3    lisinopril (PRINIVIL) 5 MG Tab Take 1 Tablet by mouth every morning. 90 Tablet 1    atorvastatin (LIPITOR) 40 MG Tab Take 1 Tablet by mouth every day. Take 1 tablet by mouth every night at bedtime 90 Tablet 1    aspirin EC (ECOTRIN) 81 MG Tablet Delayed Response Take 1 Tablet by mouth every day. 90 Tablet 1    metoprolol SR (TOPROL XL) 100 MG TABLET SR 24 HR at bedtime.      amitriptyline (ELAVIL) 10 MG Tab amitriptyline 10 mg tablet (Patient not taking: Reported on 9/19/2022)       No current facility-administered medications for this visit.       Social History     Tobacco Use    Smoking status: Former     Packs/day: 0.25     Years: 20.00     Pack years: 5.00     Types: Cigarettes    Smokeless tobacco: Never    Tobacco comments:     Quit around age 41.   Vaping Use    Vaping Use:  "Never used   Substance Use Topics    Alcohol use: Not Currently     Comment: Very rarely, on special occasions    Drug use: Never       No family history on file.      Physical Exam  Vitals:  /76 (BP Location: Left arm, Patient Position: Sitting, BP Cuff Size: Large adult)   Pulse (!) 58   Temp 36.8 °C (98.2 °F) (Temporal)   Ht 1.702 m (5' 7\")   Wt 98.4 kg (217 lb)   SpO2 95%  Body mass index is 33.99 kg/m².  Constitutional:  Not in acute distress, well appearing.  HEENT:   NC/AT  Cardiovascular: Irregular rate and normal rhythm. No murmurs or gallops.      Lungs:   Clear to auscultation bilaterally. No wheezes or crackles  Extremities:  No cyanosis/clubbing/edema. No obvious deformities.  Skin:  Warm and dry.  No visible rashes.  Neurologic: Alert & oriented, CN II-XII grossly intact, gait normal, no focal deficits noted.  Psychiatric:  No SI/HI.      Assessment and Plan:     1. 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 For close follow up to assess for response to medication and reassess mood. Patient to follow with Dr. Gonzalez or Dr. Farley - interested in meeting with Swazi-speaking provider for a more detailed assessment.  -Social work assessed patient for ongoing home support    2. Essential hypertension  BP in clinic today looks good at 119/76 mmHg  Denies headache/dizziness, visual changes, chest pain, palpitations, shortness of breath  On lisinopril 5 mg daily metoprolol 150 mg daily  -Continue current medications. Metoprolol prescribed by " cardiologist    3. Hyperlipidemia, unspecified hyperlipidemia type  -On atorvastatin 40mg daily, continue   -For lipid panel next visit    Return in about 5 weeks (around 10/24/2022).    Please note that this dictation was created using voice recognition software. I have made every reasonable attempt to correct obvious errors, but I expect that there are errors of grammar and possibly content that I did not discover before finalizing the note.    Total face-to-face time spent in medical decision makin min    Daina Suero M.D. PGY-2  [facility]

## 2022-09-19 NOTE — PATIENT INSTRUCTIONS
Great seeing you today, thank you for coming in!  Please  your mirtazapine, new dose is now 15mg every night. You can continue to take the 7.5mg two tabs at bedtime for now - I have also sent a prescription with the new dose to your pharmacy  Please follow up with Dr. Argueta around Oct 10-19, 2022 to assess depression and mirtazapine

## 2022-09-20 DIAGNOSIS — F32.9 MAJOR DEPRESSIVE DISORDER WITH CURRENT ACTIVE EPISODE, UNSPECIFIED DEPRESSION EPISODE SEVERITY, UNSPECIFIED WHETHER RECURRENT: ICD-10-CM

## 2022-09-20 NOTE — TELEPHONE ENCOUNTER
Student  met with client and his daughter at request of PCP. Patient is interested in counseling and would be interested in a psychology referral.  relayed this information to attending physician. Patient is also interested in a resource that would provide in-home visits to check on patient and provide interaction. Student  explained she was was not certain if this service was available but that she would look into resources and follow-up by next Tuesday. Patient gave permission for student  to contact daughter with more information.     Juhi BOLDEN Intern

## 2022-10-03 ENCOUNTER — TELEPHONE (OUTPATIENT)
Dept: INTERNAL MEDICINE | Facility: OTHER | Age: 87
End: 2022-10-03
Payer: MEDICARE

## 2022-10-03 NOTE — TELEPHONE ENCOUNTER
MSW intern followed up with patient's daugher, Pedro Robb, about psychology referral. The patient doesn't speak English and would need a Panamanian speaking counselor. MSW intern provided contact information for the referred psychology group, and information for another group who has an entirely Panamanian speaking bilingual staff. MSW intern explained that both groups currently have a waiting list for providers accepting his insurance.    Juhi Fraser  MSW Intern

## 2022-11-04 ENCOUNTER — PATIENT MESSAGE (OUTPATIENT)
Dept: HEALTH INFORMATION MANAGEMENT | Facility: OTHER | Age: 87
End: 2022-11-04

## 2022-11-10 ENCOUNTER — APPOINTMENT (OUTPATIENT)
Dept: INTERNAL MEDICINE | Facility: OTHER | Age: 87
End: 2022-11-10
Payer: MEDICARE

## 2022-12-12 RX ORDER — LISINOPRIL 5 MG/1
5 TABLET ORAL EVERY MORNING
Qty: 90 TABLET | Refills: 1 | Status: SHIPPED | OUTPATIENT
Start: 2022-12-12 | End: 2023-11-26 | Stop reason: SDUPTHER

## 2022-12-12 RX ORDER — MIRTAZAPINE 15 MG/1
15 TABLET, FILM COATED ORAL NIGHTLY
Qty: 30 TABLET | Refills: 3 | Status: SHIPPED | OUTPATIENT
Start: 2022-12-12 | End: 2023-10-25

## 2022-12-12 NOTE — TELEPHONE ENCOUNTER
Patient needs refill of lisinorpil and mirtazapine before appointment with bhupendra on the 19th. Patient has been out for 4 ays and has been trying to contact the office with no response back.   Patient is annoyed that they have to come in person every single month for refill

## 2022-12-19 ENCOUNTER — OFFICE VISIT (OUTPATIENT)
Dept: INTERNAL MEDICINE | Facility: OTHER | Age: 87
End: 2022-12-19
Payer: MEDICARE

## 2022-12-19 VITALS
OXYGEN SATURATION: 94 % | HEIGHT: 67 IN | DIASTOLIC BLOOD PRESSURE: 76 MMHG | TEMPERATURE: 97.5 F | SYSTOLIC BLOOD PRESSURE: 124 MMHG | BODY MASS INDEX: 34.18 KG/M2 | HEART RATE: 71 BPM | WEIGHT: 217.8 LBS

## 2022-12-19 DIAGNOSIS — M25.562 ARTHRALGIA OF BOTH KNEES: ICD-10-CM

## 2022-12-19 DIAGNOSIS — M25.561 ARTHRALGIA OF BOTH KNEES: ICD-10-CM

## 2022-12-19 DIAGNOSIS — I71.21 ANEURYSM OF ASCENDING AORTA WITHOUT RUPTURE (HCC): ICD-10-CM

## 2022-12-19 DIAGNOSIS — M25.552 PAIN OF BOTH HIP JOINTS: ICD-10-CM

## 2022-12-19 DIAGNOSIS — M25.551 PAIN OF BOTH HIP JOINTS: ICD-10-CM

## 2022-12-19 PROCEDURE — 99213 OFFICE O/P EST LOW 20 MIN: CPT | Mod: GE | Performed by: STUDENT IN AN ORGANIZED HEALTH CARE EDUCATION/TRAINING PROGRAM

## 2022-12-19 RX ORDER — METOPROLOL SUCCINATE 50 MG/1
50 TABLET, EXTENDED RELEASE ORAL DAILY
COMMUNITY

## 2022-12-19 ASSESSMENT — ENCOUNTER SYMPTOMS
NAUSEA: 0
DOUBLE VISION: 0
HEARTBURN: 0
DEPRESSION: 0
BLURRED VISION: 0
HEMOPTYSIS: 0
TINGLING: 0
PALPITATIONS: 0
DIZZINESS: 0
SENSORY CHANGE: 0
TREMORS: 0
WEIGHT LOSS: 0
BACK PAIN: 1
SINUS PAIN: 0
VOMITING: 0
SPEECH CHANGE: 0
HEADACHES: 0
CHILLS: 0
COUGH: 0
SHORTNESS OF BREATH: 0
MYALGIAS: 0
ABDOMINAL PAIN: 0
SPUTUM PRODUCTION: 0
NECK PAIN: 0
HALLUCINATIONS: 0
SORE THROAT: 0
PHOTOPHOBIA: 0
ORTHOPNEA: 0
BLOOD IN STOOL: 0
BRUISES/BLEEDS EASILY: 0
FEVER: 0

## 2022-12-19 ASSESSMENT — PATIENT HEALTH QUESTIONNAIRE - PHQ9: CLINICAL INTERPRETATION OF PHQ2 SCORE: 0

## 2022-12-19 ASSESSMENT — LIFESTYLE VARIABLES: SUBSTANCE_ABUSE: 0

## 2022-12-20 NOTE — PATIENT INSTRUCTIONS
- Continue home meds  - Voltaren hips and knees  - Get Xrays for hips and knees  - Follow up with Cardiology  - Get CT scans  - Follow up with me in 2 months

## 2023-01-17 ENCOUNTER — APPOINTMENT (OUTPATIENT)
Dept: RADIOLOGY | Facility: MEDICAL CENTER | Age: 88
End: 2023-01-17
Attending: STUDENT IN AN ORGANIZED HEALTH CARE EDUCATION/TRAINING PROGRAM
Payer: MEDICARE

## 2023-01-17 DIAGNOSIS — M25.552 PAIN OF BOTH HIP JOINTS: ICD-10-CM

## 2023-01-17 DIAGNOSIS — M25.561 ARTHRALGIA OF BOTH KNEES: ICD-10-CM

## 2023-01-17 DIAGNOSIS — M25.551 PAIN OF BOTH HIP JOINTS: ICD-10-CM

## 2023-01-17 DIAGNOSIS — M25.562 ARTHRALGIA OF BOTH KNEES: ICD-10-CM

## 2023-01-17 PROCEDURE — 73521 X-RAY EXAM HIPS BI 2 VIEWS: CPT

## 2023-01-17 PROCEDURE — 73565 X-RAY EXAM OF KNEES: CPT

## 2023-03-13 ENCOUNTER — OFFICE VISIT (OUTPATIENT)
Dept: INTERNAL MEDICINE | Facility: OTHER | Age: 88
End: 2023-03-13
Payer: MEDICARE

## 2023-03-13 VITALS
HEIGHT: 67 IN | TEMPERATURE: 97.1 F | BODY MASS INDEX: 34.59 KG/M2 | HEART RATE: 60 BPM | OXYGEN SATURATION: 93 % | DIASTOLIC BLOOD PRESSURE: 76 MMHG | WEIGHT: 220.4 LBS | SYSTOLIC BLOOD PRESSURE: 128 MMHG

## 2023-03-13 DIAGNOSIS — M25.561 ARTHRALGIA OF BOTH KNEES: ICD-10-CM

## 2023-03-13 DIAGNOSIS — H91.13 PRESBYCUSIS OF BOTH EARS: ICD-10-CM

## 2023-03-13 DIAGNOSIS — E66.9 OBESITY (BMI 30.0-34.9): ICD-10-CM

## 2023-03-13 DIAGNOSIS — M25.552 PAIN OF BOTH HIP JOINTS: ICD-10-CM

## 2023-03-13 DIAGNOSIS — M25.562 ARTHRALGIA OF BOTH KNEES: ICD-10-CM

## 2023-03-13 DIAGNOSIS — M25.551 PAIN OF BOTH HIP JOINTS: ICD-10-CM

## 2023-03-13 PROBLEM — R06.83 SNORING: Status: ACTIVE | Noted: 2023-01-13

## 2023-03-13 PROCEDURE — 99214 OFFICE O/P EST MOD 30 MIN: CPT | Mod: GC | Performed by: STUDENT IN AN ORGANIZED HEALTH CARE EDUCATION/TRAINING PROGRAM

## 2023-03-13 ASSESSMENT — PATIENT HEALTH QUESTIONNAIRE - PHQ9: CLINICAL INTERPRETATION OF PHQ2 SCORE: 0

## 2023-03-13 ASSESSMENT — FIBROSIS 4 INDEX: FIB4 SCORE: 1.92

## 2023-03-13 NOTE — PATIENT INSTRUCTIONS
- Continue home medications  - PRN tylenol, NSAID's  - Follow up with Cardiology  - Follow up with Urology  - Referral Audiology  - Follow up with us in 6 months

## 2023-03-14 NOTE — PROGRESS NOTES
Established Patient    Patient Care Team:  Vivek Lane M.D. as PCP - General (Internal Medicine)    HPI:  Sammy Yanez is a 87 y.o. male who presents today with the following Chief Complaint(s):   Chief Complaint   Patient presents with    Pain     Patient here for pain hips and knees   Mr. Yanez is here for follow up for hip and knee pain, I saw him last visit for this chief complaint and I ordered imaging.  Bilateral knee xrays very unremarkable.  Hip xrays only showing degenerative changes in SI joints and lumbar spine.  He has had mild/moderate on and off pain responsive to OTC medications, he is able to ambulate at times uses cane for assistance, but otherwise doing well.  He recently saw Cardiologist after CT angio for Thoracic aneurism monitoring and apparently it has been stable in size, he is scheduled to get an ECHO and see again his Cardiologist. He denies SOB, palpitations or CP with exertion.     Review of Systems   Constitutional: Denies chills, fever, fatigue/malaise, weight changes  HEENT: Denies blurry vision, congestion, sore throat  Respiratory: Denies shortness of breath, cough, wheezing   Cardiovascular: Denies chest pain, palpitations, leg swelling  Gastrointestinal: Denies heartburn, nausea, vomiting, abdominal pain, constipation, diarrhea  Genitourinary: Denies dysuria, frequency  Musculoskeletal: Denies falls and myalgias.   Skin: Denies itching and rash.   Neurological: Negative for headaches, dizziness, loss of consciousness      Past Medical History:   Diagnosis Date    Arrhythmia     Arthritis 09/07/2021    Lumbar area and shoulders.    Cataract     Bilateral IOL's.    COPD (chronic obstructive pulmonary disease) ([facility])     9/7/2021 - states pt not diagnosed with COPD, pulmonary referrel pending, uses inhaler daily at this time.    Dental disorder     Several teeth missing/pulled.    High cholesterol 09/07/2021    No medication at this time, daughter will follow up with  "PCP.    History of UTI     Hypertension     Psychiatric problem 09/07/2021    Bipolar - no medication.    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    Current Outpatient Medications   Medication Sig Dispense Refill    metoprolol SR (TOPROL XL) 50 MG TABLET SR 24 HR Take 50 mg by mouth every day.      lisinopril (PRINIVIL) 5 MG Tab Take 1 Tablet by mouth every morning. 90 Tablet 1    mirtazapine (REMERON) 15 MG Tab Take 1 Tablet by mouth every evening. 30 Tablet 3    Soft Lens Products (SENSITIVE EYES SALINE) Solution Administer 1-2 Drops into both eyes 1 time a day as needed (Dry eyes). 237 mL 1    atorvastatin (LIPITOR) 40 MG Tab Take 1 Tablet by mouth every day. Take 1 tablet by mouth every night at bedtime 90 Tablet 1    aspirin EC (ECOTRIN) 81 MG Tablet Delayed Response Take 1 Tablet by mouth every day. 90 Tablet 1    metoprolol SR (TOPROL XL) 100 MG TABLET SR 24 HR at bedtime.       No current facility-administered medications for this visit.       Social History     Tobacco Use    Smoking status: Former     Packs/day: 0.25     Years: 20.00     Pack years: 5.00     Types: Cigarettes    Smokeless tobacco: Never    Tobacco comments:     Quit around age 41.   Vaping Use    Vaping Use: Never used   Substance Use Topics    Alcohol use: Not Currently     Comment: Very rarely, on special occasions    Drug use: Never       No family history on file.      Physical Exam  Vitals:  /76 (BP Location: Right arm, Patient Position: Sitting, BP Cuff Size: Large adult)   Pulse 60   Temp 36.2 °C (97.1 °F) (Temporal)   Ht 1.702 m (5' 7\")   Wt 100 kg (220 lb 6.4 oz)   SpO2 " 93%  Body mass index is 34.52 kg/m².    Constitutional:  Not in acute distress, well appearing.  HEENT:   NC/AT, EOMI, conjunctiva normal, hearing grossly intact  Cardiovascular: Regular rate and rhythm. No murmurs or gallops.      Lungs:   Clear to auscultation bilaterally. No wheezes or crackles. No respiratory distress.  Abdomen: Not distended, soft, not tender. No guarding or rigidity. No masses.  Extremities:  No cyanosis/clubbing/edema. No obvious deformities.  Skin:  Warm and dry.  No visible rashes.  Neurologic: Alert & oriented x 3, CN II-XII grossly intact, strength and sensation grossly intact.  No focal deficits noted.  Psychiatric:  Affect normal, mood normal, judgment normal.      Assessment and Plan:     Bilateral Hip pain  Bilateral Knee pain  - Long history of on and off joint pain both knees and hips. He states that hips are more bothersome overall.  - Pain is not severe. Mild to moderate and responds to OTC medications  - X-rays of knees very unremarkable  - Hip Xrays degenerative changes of SI joints and lumbar spine expected for age.  - Today not complaining of bothersome pain        Plan:  - Continue OTC tylenol and NSAID's   - Educated about limiting NSAID use since can cause bleeding and CKD, OK to use tylenol daily  - Regular exercise  - Follow up in 6 months    Hearing loss bilaterally  - Chronic worsening condition gradually  - Likely age related  - Normal ear exam  - Referral to audiology for hearing aid eval    Following with Cardiology for Aneurism monitoring    Problem List Items Addressed This Visit    None  Visit Diagnoses       Obesity (BMI 30.0-34.9)        Relevant Orders    Referral to Nutrition Services    Presbycusis of both ears        Relevant Orders    Referral to Audiology            Return in about 6 months (around 9/13/2023).    Please note that this dictation was created using voice recognition software. I have made every reasonable attempt to correct obvious errors, but  I expect that there are errors of grammar and possibly content that I did not discover before finalizing the note.    Dr. Domingo M.D. PGY-3  [facility]

## 2023-10-25 ENCOUNTER — OFFICE VISIT (OUTPATIENT)
Dept: INTERNAL MEDICINE | Facility: OTHER | Age: 88
End: 2023-10-25
Payer: MEDICARE

## 2023-10-25 VITALS
TEMPERATURE: 97.4 F | OXYGEN SATURATION: 96 % | WEIGHT: 223 LBS | HEIGHT: 67 IN | BODY MASS INDEX: 35 KG/M2 | SYSTOLIC BLOOD PRESSURE: 121 MMHG | DIASTOLIC BLOOD PRESSURE: 78 MMHG | HEART RATE: 84 BPM

## 2023-10-25 DIAGNOSIS — R42 ORTHOSTATIC DIZZINESS: ICD-10-CM

## 2023-10-25 DIAGNOSIS — M16.0 PRIMARY OSTEOARTHRITIS OF BOTH HIPS: ICD-10-CM

## 2023-10-25 DIAGNOSIS — M51.36 LUMBAR DEGENERATIVE DISC DISEASE: ICD-10-CM

## 2023-10-25 DIAGNOSIS — F32.A DEPRESSION, UNSPECIFIED DEPRESSION TYPE: ICD-10-CM

## 2023-10-25 DIAGNOSIS — M54.50 LUMBAR BACK PAIN: ICD-10-CM

## 2023-10-25 DIAGNOSIS — R42 DYSEQUILIBRIUM: ICD-10-CM

## 2023-10-25 DIAGNOSIS — R41.89 COGNITIVE CHANGE: ICD-10-CM

## 2023-10-25 DIAGNOSIS — M46.1 SACROILIITIS (HCC): ICD-10-CM

## 2023-10-25 DIAGNOSIS — M17.0 PRIMARY OSTEOARTHRITIS OF BOTH KNEES: ICD-10-CM

## 2023-10-25 PROCEDURE — 3074F SYST BP LT 130 MM HG: CPT | Performed by: INTERNAL MEDICINE

## 2023-10-25 PROCEDURE — 3078F DIAST BP <80 MM HG: CPT | Performed by: INTERNAL MEDICINE

## 2023-10-25 PROCEDURE — 99214 OFFICE O/P EST MOD 30 MIN: CPT | Performed by: INTERNAL MEDICINE

## 2023-10-25 RX ORDER — MIRTAZAPINE 15 MG/1
15 TABLET, FILM COATED ORAL NIGHTLY
Qty: 30 TABLET | Refills: 0 | Status: CANCELLED | OUTPATIENT
Start: 2023-10-25

## 2023-10-25 RX ORDER — CITALOPRAM HYDROBROMIDE 10 MG/1
10 TABLET ORAL DAILY
Qty: 30 TABLET | Refills: 1 | Status: SHIPPED | OUTPATIENT
Start: 2023-10-25 | End: 2023-12-22

## 2023-10-25 NOTE — PROGRESS NOTES
Chief Complaint   Patient presents with    Depression     Needs refill on medications        HPI: Sammy Yanez is a 88 y.o. male with past medical history as below  who presented to the clinic for the following.      Follow-up of depression, cognitive decline, unable to get a refill on mirtazapine.  Patient is Frisian only speaking, is accompanied by her daughter today.  Wife passed away on September 19 after 62 years of marriage, patient has been even more depressed than normal.  Patient has been on mirtazapine even prior to spouse's death.  -Patient has been found talking to the urn, crying a lot.  Sleeping a lot.  Rarely goes outside anymore.  -Normally he expresses his frustration regarding general matters to his wife however now he is currently living with his son and this has been difficult for him as well.  -Daughter is accompanying him today, and also reports that he sometimes sees things like shadows and hears basals and other sounds which are not present.  He is also is reported to have seen a light coming from the urn, and sometimes wishes that he could join his wife.   -No intent for suicide, daughter confirms that that is not in favor of his believes at all.   -Of note patient was briefly discontinued off mirtazapine due to the possibility of it being a reactive depression secondary to wife's passing however he has had to go back on it as per daughter.  When it was first started patient was also having weight loss and decreased appetite, however his weight has been trending up based on most recent trends, BMI of 34.93 at this time.      *Recent fall  -Patient's daughter also mentions a fall that he has had before, reported to have some balance issues when he is standing up from a seated position or standing up from a lying down position when he feels a little spinning, patient has been staying very well-hydrated drinking very big gallons of water.  -Patient also reports that he has very bad  bilateral knees, and hip pain along with low back pain.  -On examination patient does have tenderness to palpation in the lumbar midline, patient did have x-ray done after the fall which did not show any fractures but did show degenerative changes including sacroiliitis.  -On hip examination patient does have painful internal and external range of motion of the left hip in the anterior area, there is pain reported on the left hip however examination is normal today.  -Patient does have symmetrical strength on bilateral lower extremities, sensation not impacted.  -Straight leg test is negative.  -Reflexes deferred on this visit.      *Essential hypertension   -Well-controlled on this visit, currently on metoprolol 150, lisinopril 5 mg.    *Obstructive sleep apnea.  -Patient is currently not using CPAP as he was not able to tolerate the machine.        Other pertinent problems are discussed in detail this visit include  *Paroxysmal atrial fibrillation.  -Patient is currently on metoprolol, not on any anticoagulation, following up with cardiology for the same.  *Dyslipidemia-patient is on 40 mg atorvastatin.  Social history, family history not discussed on this visit.          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  "30 Tablet 1    metoprolol SR (TOPROL XL) 50 MG TABLET SR 24 HR Take 50 mg by mouth every day.      lisinopril (PRINIVIL) 5 MG Tab Take 1 Tablet by mouth every morning. 90 Tablet 1    atorvastatin (LIPITOR) 40 MG Tab Take 1 Tablet by mouth every day. Take 1 tablet by mouth every night at bedtime 90 Tablet 1    aspirin EC (ECOTRIN) 81 MG Tablet Delayed Response Take 1 Tablet by mouth every day. 90 Tablet 1    metoprolol SR (TOPROL XL) 100 MG TABLET SR 24 HR at bedtime.      Soft Lens Products (SENSITIVE EYES SALINE) Solution Administer 1-2 Drops into both eyes 1 time a day as needed (Dry eyes). (Patient not taking: Reported on 10/25/2023) 237 mL 1     No current facility-administered medications for this visit.       ROS: As per HPI. Additional pertinent systems as noted below.  Constitutional:  Negative for fever, chills   Cardiovascular:  Negative for chest pain, palpitations   Respiratory:  Negative for cough, sputum production, shortness of breath   Neurologic: Negative for headaches, , seizures, positive as in HPI  MSK: Positive as in HPI      /78 (BP Location: Left arm, Patient Position: Sitting, BP Cuff Size: Adult)   Pulse 84   Temp 36.3 °C (97.4 °F) (Temporal)   Ht 1.702 m (5' 7\")   Wt 101 kg (223 lb)   SpO2 96%   BMI 34.93 kg/m²     Physical Exam   Constitutional:   Not in acute distress   Musculoskeletal: On examination patient does have tenderness to palpation in the lumbar midline, patient did have x-ray done after the fall which did not show any fractures but did show degenerative changes including sacroiliitis.  -On hip examination patient does have painful internal and external range of motion of the left hip in the anterior area, there is pain reported on the left hip however examination is normal today.  -Patient does have symmetrical strength on bilateral lower extremities, sensation not impacted.  -Straight leg test is negative.  -Reflexes deferred on this visit.  Neurologic: Alert & " oriented x 4, No focal deficits noted, cranial nerves II through X are grossly intact.   Psychiatric: Judgment normal, Mood normal, Memory normal     Note: I have reviewed all pertinent labs and diagnostic tests associated with this visit with specific comments listed under the assessment and plan below    Assessment and Plan    1. Depression, unspecified depression type  -We will transition patient from mirtazapine to citalopram which is possibly slightly superior than other SSRIs in the setting of ongoing cognitive deficits.  -We will start a low-dose, will also check for reversible causes including thyroid function study.    - TSH WITH REFLEX TO FT4; Future  - VITAMIN B12; Future    2. Cognitive change  -We will do basic work-up for ruling out other possible reversible causes.  -Treatment of depression as mentioned above.  -Patient's daughter also encouraged to continue working on getting the best mask fitting for CPAP machine given that it is contributing to sleep issues and in turn making cognitive issues and mood worse.    - citalopram (CELEXA) 10 MG tablet; Take 1 Tablet by mouth every day.  Dispense: 30 Tablet; Refill: 1  - TSH WITH REFLEX TO FT4; Future  - VITAMIN B12; Future  - VITAMIN B1; Future  - VITAMIN B6; Future    3. Orthostatic dizziness  -Patient is staying well-hydrated at this moment, however is drinking more water than recommended possibly, hence we will check a sodium level.  -We will also recommend conservative measures including standing up slowly which patient is already following, compression stockings if needed.  -It was also benefit from physical therapy for above-mentioned problems as well as disequilibrium that may be age-related.    - Comp Metabolic Panel; Future    4. Primary osteoarthritis of both knees    - Referral to Physical Therapy  - VITAMIN D,25 HYDROXY (DEFICIENCY); Future    5. Primary osteoarthritis of both hips    - Referral to Physical Therapy  - VITAMIN D,25 HYDROXY  (DEFICIENCY); Future    6. Sacroiliitis (HCC)    - Referral to Physical Therapy  - VITAMIN D,25 HYDROXY (DEFICIENCY); Future    7. Lumbar back pain    - VITAMIN D,25 HYDROXY (DEFICIENCY); Future    8. Lumbar degenerative disc disease    - Referral to Physical Therapy  - VITAMIN D,25 HYDROXY (DEFICIENCY); Future    9. Dysequilibrium  -Physical therapy, management of above joint issues through physical therapy as well.  -Management of orthostatic dizziness as mentioned above.    Followup: Return in about 1 month (around 11/25/2023).        Signed by: Jj Molina M.D.

## 2023-10-25 NOTE — TELEPHONE ENCOUNTER
Pt. Showed up late for appointment but still needed this RX filled. They tried to see if there were any no shows but everyone showed up. They are getting rescheduled but requesting the RX. She personally mentioned Dr. JEROME name and to speak with her but was informed that Dr. JEROME is with patients. She stated she needed an apt. With Dr. JEROME but was informed Dr. JEROME does not have private clinic, she wanted her to be aware of this.     Received request via: Patient    Was the patient seen in the last year in this department? Yes - 3/13/23    Does the patient have an active prescription (recently filled or refills available) for medication(s) requested?  Yes    Does the patient have long-term Plus and need 100 day supply (blood pressure, diabetes and cholesterol meds only)? Patient does not have SCP

## 2023-10-26 ENCOUNTER — TELEPHONE (OUTPATIENT)
Dept: INTERNAL MEDICINE | Facility: OTHER | Age: 88
End: 2023-10-26
Payer: MEDICARE

## 2023-10-26 PROBLEM — R42 ORTHOSTATIC DIZZINESS: Status: ACTIVE | Noted: 2023-10-26

## 2023-10-26 PROBLEM — M54.50 LUMBAR BACK PAIN: Status: ACTIVE | Noted: 2023-10-26

## 2023-10-26 PROBLEM — M17.0 PRIMARY OSTEOARTHRITIS OF BOTH KNEES: Status: ACTIVE | Noted: 2023-10-26

## 2023-10-26 PROBLEM — R42 DYSEQUILIBRIUM: Status: ACTIVE | Noted: 2023-10-26

## 2023-10-26 PROBLEM — M46.1 SACROILIITIS (HCC): Status: ACTIVE | Noted: 2023-10-26

## 2023-10-26 PROBLEM — M51.36 LUMBAR DEGENERATIVE DISC DISEASE: Status: ACTIVE | Noted: 2023-10-26

## 2023-10-26 PROBLEM — M16.0 PRIMARY OSTEOARTHRITIS OF BOTH HIPS: Status: ACTIVE | Noted: 2023-10-26

## 2023-10-26 PROBLEM — F32.A DEPRESSION: Status: ACTIVE | Noted: 2023-10-26

## 2023-10-26 PROBLEM — M51.369 LUMBAR DEGENERATIVE DISC DISEASE: Status: ACTIVE | Noted: 2023-10-26

## 2023-10-26 PROBLEM — R41.89 COGNITIVE CHANGE: Status: ACTIVE | Noted: 2023-10-26

## 2023-10-26 NOTE — TELEPHONE ENCOUNTER
Phone Number Called: [phone]    Call outcome: Did not leave a detailed message. Requested patient to call back.    Message: Left vm requesting a call back to discuss new lab orders.

## 2023-10-26 NOTE — TELEPHONE ENCOUNTER
Please call patient and let them know that I have also ordered some lab tests for work up of depression and behavioral changes like thyroid and some vitamin levels along with sodium levels. Please send to their home  address or [facility]

## 2023-10-31 NOTE — TELEPHONE ENCOUNTER
Phone Number Called: [phone]    Call outcome: Spoke to patient regarding message below.    Message: Spoke with patient's daughter, Katharine, and informed her of new lab orders. I informed her that if they go to a [facility] lab that they will have the orders on file but if they go to a lab like [facility] or [facility] that they will need to take the lab requests with them. She stated she could not remember what lab she takes the patient to but would like for them to be mailed to the mailing address listed. Lab orders were mailed to mailing address on file.

## 2023-11-18 ENCOUNTER — HOSPITAL ENCOUNTER (OUTPATIENT)
Dept: LAB | Facility: MEDICAL CENTER | Age: 88
End: 2023-11-18
Attending: INTERNAL MEDICINE
Payer: MEDICARE

## 2023-11-18 DIAGNOSIS — M17.0 PRIMARY OSTEOARTHRITIS OF BOTH KNEES: ICD-10-CM

## 2023-11-18 DIAGNOSIS — R42 ORTHOSTATIC DIZZINESS: ICD-10-CM

## 2023-11-18 DIAGNOSIS — M51.36 LUMBAR DEGENERATIVE DISC DISEASE: ICD-10-CM

## 2023-11-18 DIAGNOSIS — M16.0 PRIMARY OSTEOARTHRITIS OF BOTH HIPS: ICD-10-CM

## 2023-11-18 DIAGNOSIS — R41.89 COGNITIVE CHANGE: ICD-10-CM

## 2023-11-18 DIAGNOSIS — M54.50 LUMBAR BACK PAIN: ICD-10-CM

## 2023-11-18 DIAGNOSIS — M46.1 SACROILIITIS (HCC): ICD-10-CM

## 2023-11-18 DIAGNOSIS — F32.A DEPRESSION, UNSPECIFIED DEPRESSION TYPE: ICD-10-CM

## 2023-11-18 LAB
25(OH)D3 SERPL-MCNC: 18 NG/ML (ref 30–100)
ALBUMIN SERPL BCP-MCNC: 4.2 G/DL (ref 3.2–4.9)
ALBUMIN/GLOB SERPL: 1.4 G/DL
ALP SERPL-CCNC: 54 U/L (ref 30–99)
ALT SERPL-CCNC: 16 U/L (ref 2–50)
ANION GAP SERPL CALC-SCNC: 12 MMOL/L (ref 7–16)
AST SERPL-CCNC: 19 U/L (ref 12–45)
BILIRUB SERPL-MCNC: 0.8 MG/DL (ref 0.1–1.5)
BUN SERPL-MCNC: 25 MG/DL (ref 8–22)
CALCIUM ALBUM COR SERPL-MCNC: 9.3 MG/DL (ref 8.5–10.5)
CALCIUM SERPL-MCNC: 9.5 MG/DL (ref 8.5–10.5)
CHLORIDE SERPL-SCNC: 104 MMOL/L (ref 96–112)
CO2 SERPL-SCNC: 21 MMOL/L (ref 20–33)
CREAT SERPL-MCNC: 1.14 MG/DL (ref 0.5–1.4)
GFR SERPLBLD CREATININE-BSD FMLA CKD-EPI: 62 ML/MIN/1.73 M 2
GLOBULIN SER CALC-MCNC: 3 G/DL (ref 1.9–3.5)
GLUCOSE SERPL-MCNC: 103 MG/DL (ref 65–99)
POTASSIUM SERPL-SCNC: 5 MMOL/L (ref 3.6–5.5)
PROT SERPL-MCNC: 7.2 G/DL (ref 6–8.2)
SODIUM SERPL-SCNC: 137 MMOL/L (ref 135–145)
T4 FREE SERPL-MCNC: 0.83 NG/DL (ref 0.93–1.7)
TSH SERPL DL<=0.005 MIU/L-ACNC: 6.08 UIU/ML (ref 0.38–5.33)
VIT B12 SERPL-MCNC: 383 PG/ML (ref 211–911)

## 2023-11-18 PROCEDURE — 80053 COMPREHEN METABOLIC PANEL: CPT

## 2023-11-18 PROCEDURE — 82607 VITAMIN B-12: CPT

## 2023-11-18 PROCEDURE — 82306 VITAMIN D 25 HYDROXY: CPT | Mod: GA

## 2023-11-18 PROCEDURE — 36415 COLL VENOUS BLD VENIPUNCTURE: CPT

## 2023-11-18 PROCEDURE — 84425 ASSAY OF VITAMIN B-1: CPT

## 2023-11-18 PROCEDURE — 84443 ASSAY THYROID STIM HORMONE: CPT

## 2023-11-18 PROCEDURE — 84207 ASSAY OF VITAMIN B-6: CPT

## 2023-11-18 PROCEDURE — 84439 ASSAY OF FREE THYROXINE: CPT

## 2023-11-20 NOTE — PROGRESS NOTES
Chief Complaint   Patient presents with    Follow-Up     New medication, condition improving    Medication Refill     Lisinopril, atorvastatin    Other     Gets winded when talking.  Some blood in urine.  Back pain.   Does not want to see physical therapis.    Lab Results       HPI: Sammy Yanez is a 88 y.o. male with past medical history as below  who presented to the clinic for the following.      Follow-up of depression, cognitive decline  Patient is Yakut only speaking, is accompanied by her daughter today.  -more depressed than normal since spouse;s death in September after 62 years of marriage   Patient had been on mirtazapine even prior to spouse's death.  -Patient has been found talking to the urn, crying a lot.  Sleeping a lot.  Rarely goes outside anymore.  -Normally he expresses his frustration regarding general matters to his wife however now he is currently living with his son and this has been difficult for him as well.  - sometimes sees things like shadows and hears basals and other sounds which are not present.  He is also is reported to have seen a light coming from the urn, and sometimes wishes that he could join his wife.   -No intent for suicide, daughter confirms that that is not in favor of his believes at all.   -Of note patient was briefly discontinued off mirtazapine due to the possibility of it being a reactive depression secondary to wife's passing however he has had to go back on it as per daughter.  When it was first started patient was also having weight loss and decreased appetite, however his weight has been trending up based on most recent trends, BMI of 34.93 at this time.  -Started on citalopram on last visit and daughter reports that she has noticed improvement and that he is not sleeping a lot like before   Repeat PHQ in Somali on file      *Recent fall  -Patient's daughter also mentions a fall that he has had before, reported to have some balance issues when he is  standing up from a seated position or standing up from a lying down position when he feels a little spinning, patient has been staying very well-hydrated drinking very big gallons of water.  -Patient also reports that he has very bad bilateral knees, and hip pain along with low back pain.  -On examination patient does have tenderness to palpation in the lumbar midline, patient did have x-ray done after the fall which did not show any fractures but did show degenerative changes including sacroiliitis.  -On hip examination patient does have painful internal and external range of motion of the left hip in the anterior area, there is pain reported on the left hip however examination is normal today.  -Patient does have symmetrical strength on bilateral lower extremities, sensation not impacted.  -Straight leg test is negative.  -physical therapy referral was placed - however patient has declined it for this time as per daughter - however she will try to  convince him       *Essential hypertension   -Well-controlled on this visit, currently on metoprolol 150, lisinopril 5 mg.    *Obstructive sleep apnea.  -Patient is currently not using CPAP as he was not able to tolerate the machine.    Hypothyroidism  -noted on labs.   Does have loose stools at times, no constipation. Does have low mood and cognitive changes as mentioned above.   -through shared decision making decided to check labs again in 6 weeks before starting levothyroxine due to history of a fib          *Low vitamin B6   No symptoms of malabsorption - alcohol use not discussed  Does report some falls, no neuropathy reported      B12 normal   B1 normal   Elevated BUN  -noted on labs   Patient drinks plenty of water however was fasting the day of labs - had not drank any water     *History of hematuria   -Unclear cause   -Was seen by urology a few months ago - has not followed up - continues to have intermittent hematuria that is gross - denies any other symptoms    -S/p cystoscopy as per daughter     *Allergic rhinitis   -with throat clearing - phlegm in throat -PND , ongoing for 2-3 months, also with coughing itching in throat     More when he is upright , obstructing breathing sometimes   Not bothering him at night   -Worse over the last few days   No fevers or chills.   -Has not tried anything for it   -is also having acid reflux     Other pertinent problems are discussed in detail this visit include  *Paroxysmal atrial fibrillation.  -Patient is currently on metoprolol, not on any anticoagulation, following up with cardiology for the same.  *Dyslipidemia-patient is on 40 mg atorvastatin.  Social history, family history not discussed on this visit.pending reestablishing visit in 6 weeks   Orthostatic dizziness       Patient Active Problem List    Diagnosis Date Noted    Depression 10/26/2023    Cognitive change 10/26/2023    Orthostatic dizziness 10/26/2023    Primary osteoarthritis of both knees 10/26/2023    Primary osteoarthritis of both hips 10/26/2023    Sacroiliitis (HCC) 10/26/2023    Lumbar back pain 10/26/2023    Lumbar degenerative disc disease 10/26/2023    Dysequilibrium 10/26/2023    Snoring 01/13/2023    Pure hypercholesterolemia 08/23/2022    Reactive depression 08/23/2022    Ascending aortic aneurysm (HCC) 12/10/2021    Essential hypertension 08/12/2021    Unspecified atrial fibrillation (HCC) 07/07/2021       Current Outpatient Medications   Medication Sig Dispense Refill    loratadine (CLARITIN) 10 MG Tab Take 1 Tablet by mouth every day. 30 Tablet 1    omeprazole (PRILOSEC) 20 MG delayed-release capsule Take 1 Capsule by mouth every day. 30 Capsule 1    pyridoxine (VITAMIN B-6) 25 MG Tab Take 1 Tablet by mouth every day. 14 Tablet 0    citalopram (CELEXA) 10 MG tablet Take 1 Tablet by mouth every day. 30 Tablet 1    metoprolol SR (TOPROL XL) 50 MG TABLET SR 24 HR Take 50 mg by mouth every day.      lisinopril (PRINIVIL) 5 MG Tab Take 1 Tablet by mouth  "every morning. 90 Tablet 1    atorvastatin (LIPITOR) 40 MG Tab Take 1 Tablet by mouth every day. Take 1 tablet by mouth every night at bedtime 90 Tablet 1    aspirin EC (ECOTRIN) 81 MG Tablet Delayed Response Take 1 Tablet by mouth every day. 90 Tablet 1    metoprolol SR (TOPROL XL) 100 MG TABLET SR 24 HR at bedtime.      Soft Lens Products (SENSITIVE EYES SALINE) Solution Administer 1-2 Drops into both eyes 1 time a day as needed (Dry eyes). (Patient not taking: Reported on 11/22/2023) 237 mL 1     No current facility-administered medications for this visit.       ROS: As per HPI. Additional pertinent systems as noted below.  Constitutional:  Negative for fever, chills   HEENt : as in hpi  Cardiovascular:  Negative for chest pain, palpitations   Respiratory:  not short of breath however phelgm obstructing breathing sometimes as per patient    Neurologic: Negative for headaches, , seizures, positive as in HPI  MSK: Positive as in HPI      /82 (BP Location: Left arm, Patient Position: Sitting, BP Cuff Size: Adult)   Pulse 74   Temp 36.8 °C (98.2 °F) (Temporal)   Ht 1.702 m (5' 7\")   Wt 101 kg (223 lb 3.2 oz)   SpO2 92%   BMI 34.96 kg/m²     Physical Exam   Constitutional:   Not in acute distress   HEENt : posterior pharyngeal erythema, no exudates, no PND   TM bilaterally normal some externa erythema   CVS /RESPI: normal S1 and s2 no added sounded, no murmurs, symmetrical breath sounds.   Musculoskeletal: not examined today - last visit exam in hpi  Neurologic: Alert & oriented x 4,   Psychiatric: Judgment normal, Mood normal, Memory normal     Note: I have reviewed all pertinent labs and diagnostic tests associated with this visit with specific comments listed under the assessment and plan below    Assessment and Plan    1. Depression, unspecified depression type  - doing well on citalopram   -Does have hypothyroidism - decided through shared decision making  to recheck in 6 weeks to confirm before " starting levothyroxine due to history of Afib  -Did not explore subject of possible hallucinations on this visit   No Si/HI - Nondenominational   Continue citalopram for now         2. Cognitive change  TSH abnormal - plan as above   B1 normal   B6 low - replete   Continue citalopram as there could also be a component of pseudodementia from depression       3. Hypothyroidism, unspecified type   ? With cognitive changes?  decided through shared decision making  to recheck in 6 weeks to confirm before starting levothyroxine due to history of Afib  - TSH WITH REFLEX TO FT4; Future    4. Post-nasal drip  Trial of claritin for 5 days followed by addition of omeprazole.   - loratadine (CLARITIN) 10 MG Tab; Take 1 Tablet by mouth every day.  Dispense: 30 Tablet; Refill: 1    5. Heart burn    - omeprazole (PRILOSEC) 20 MG delayed-release capsule; Take 1 Capsule by mouth every day.  Dispense: 30 Capsule; Refill: 1    6. Hematuria, unspecified type  UA sent to lab   Encouraged follow up with urology   Decreased gfr - repeat in 6 weeks.   - URINALYSIS; Future    7. Decreased GFR  Decreased gfr - repeat in 6 weeks.   Does have hematuria - possibly urological and not GN  - Basic Metabolic Panel; Future    8. Low pyridoxine level  Unclear cause - replete and recheck   - pyridoxine (VITAMIN B-6) 25 MG Tab; Take 1 Tablet by mouth every day.  Dispense: 14 Tablet; Refill: 0  - VITAMIN B6; Future    9. Dyslipidemia    - atorvastatin (LIPITOR) 40 MG Tab; Take 1 Tablet by mouth every day. Take 1 tablet by mouth every night at bedtime  Dispense: 90 Tablet; Refill: 1    10. Essential hypertension  Near control   - lisinopril (PRINIVIL) 5 MG Tab; Take 1 Tablet by mouth every morning.  Dispense: 90 Tablet; Refill: 1     Followup: Return in about 6 weeks (around 1/3/2024).        Signed by: Jj Molina M.D.

## 2023-11-22 ENCOUNTER — OFFICE VISIT (OUTPATIENT)
Dept: INTERNAL MEDICINE | Facility: OTHER | Age: 88
End: 2023-11-22
Payer: MEDICARE

## 2023-11-22 VITALS
HEIGHT: 67 IN | TEMPERATURE: 98.2 F | SYSTOLIC BLOOD PRESSURE: 135 MMHG | DIASTOLIC BLOOD PRESSURE: 82 MMHG | WEIGHT: 223.2 LBS | OXYGEN SATURATION: 92 % | BODY MASS INDEX: 35.03 KG/M2 | HEART RATE: 74 BPM

## 2023-11-22 DIAGNOSIS — E03.9 HYPOTHYROIDISM, UNSPECIFIED TYPE: ICD-10-CM

## 2023-11-22 DIAGNOSIS — R94.4 DECREASED GFR: ICD-10-CM

## 2023-11-22 DIAGNOSIS — F32.A DEPRESSION, UNSPECIFIED DEPRESSION TYPE: ICD-10-CM

## 2023-11-22 DIAGNOSIS — R41.89 COGNITIVE CHANGE: ICD-10-CM

## 2023-11-22 DIAGNOSIS — I10 ESSENTIAL HYPERTENSION: ICD-10-CM

## 2023-11-22 DIAGNOSIS — R31.9 HEMATURIA, UNSPECIFIED TYPE: ICD-10-CM

## 2023-11-22 DIAGNOSIS — R12 HEART BURN: ICD-10-CM

## 2023-11-22 DIAGNOSIS — E53.1: ICD-10-CM

## 2023-11-22 DIAGNOSIS — E78.5 DYSLIPIDEMIA: ICD-10-CM

## 2023-11-22 DIAGNOSIS — R09.82 POST-NASAL DRIP: ICD-10-CM

## 2023-11-22 LAB
VIT B1 BLD-MCNC: 136 NMOL/L (ref 70–180)
VIT B6 SERPL-MCNC: 18.9 NMOL/L (ref 20–125)

## 2023-11-22 PROCEDURE — 99214 OFFICE O/P EST MOD 30 MIN: CPT | Performed by: INTERNAL MEDICINE

## 2023-11-22 PROCEDURE — 3079F DIAST BP 80-89 MM HG: CPT | Performed by: INTERNAL MEDICINE

## 2023-11-22 PROCEDURE — 3075F SYST BP GE 130 - 139MM HG: CPT | Performed by: INTERNAL MEDICINE

## 2023-11-22 RX ORDER — LORATADINE 10 MG/1
10 TABLET ORAL DAILY
Qty: 30 TABLET | Refills: 1 | Status: SHIPPED | OUTPATIENT
Start: 2023-11-22 | End: 2024-01-04

## 2023-11-22 RX ORDER — OMEPRAZOLE 20 MG/1
20 CAPSULE, DELAYED RELEASE ORAL DAILY
Qty: 30 CAPSULE | Refills: 1 | Status: SHIPPED | OUTPATIENT
Start: 2023-11-22 | End: 2023-12-22

## 2023-11-22 RX ORDER — PYRIDOXINE HCL (VITAMIN B6) 25 MG
25 TABLET ORAL DAILY
Qty: 14 TABLET | Refills: 0 | Status: SHIPPED | OUTPATIENT
Start: 2023-11-22 | End: 2024-02-19

## 2023-11-22 ASSESSMENT — FIBROSIS 4 INDEX: FIB4 SCORE: 1.8

## 2023-11-26 RX ORDER — LISINOPRIL 5 MG/1
5 TABLET ORAL EVERY MORNING
Qty: 90 TABLET | Refills: 1 | Status: SHIPPED | OUTPATIENT
Start: 2023-11-26

## 2023-11-26 RX ORDER — ATORVASTATIN CALCIUM 40 MG/1
40 TABLET, FILM COATED ORAL DAILY
Qty: 90 TABLET | Refills: 1 | Status: SHIPPED | OUTPATIENT
Start: 2023-11-26

## 2023-12-21 ENCOUNTER — APPOINTMENT (OUTPATIENT)
Dept: INTERNAL MEDICINE | Facility: OTHER | Age: 88
End: 2023-12-21
Payer: MEDICARE

## 2023-12-22 DIAGNOSIS — R41.89 COGNITIVE CHANGE: ICD-10-CM

## 2023-12-22 DIAGNOSIS — R12 HEART BURN: ICD-10-CM

## 2023-12-22 RX ORDER — CITALOPRAM HYDROBROMIDE 10 MG/1
10 TABLET ORAL DAILY
Qty: 90 TABLET | Refills: 1 | Status: SHIPPED | OUTPATIENT
Start: 2023-12-22

## 2023-12-22 RX ORDER — OMEPRAZOLE 20 MG/1
20 CAPSULE, DELAYED RELEASE ORAL DAILY
Qty: 90 CAPSULE | Refills: 1 | Status: SHIPPED | OUTPATIENT
Start: 2023-12-22 | End: 2024-02-19

## 2023-12-22 RX ORDER — CITALOPRAM HYDROBROMIDE 10 MG/1
10 TABLET ORAL DAILY
Qty: 30 TABLET | Refills: 1 | Status: SHIPPED | OUTPATIENT
Start: 2023-12-22 | End: 2023-12-22

## 2023-12-22 RX ORDER — OMEPRAZOLE 20 MG/1
20 CAPSULE, DELAYED RELEASE ORAL DAILY
Qty: 90 CAPSULE | Refills: 0 | Status: SHIPPED | OUTPATIENT
Start: 2023-12-22 | End: 2023-12-22

## 2024-01-03 ENCOUNTER — HOSPITAL ENCOUNTER (OUTPATIENT)
Dept: LAB | Facility: MEDICAL CENTER | Age: 89
End: 2024-01-03
Attending: INTERNAL MEDICINE
Payer: MEDICARE

## 2024-01-03 DIAGNOSIS — R94.4 DECREASED GFR: ICD-10-CM

## 2024-01-03 DIAGNOSIS — R31.9 HEMATURIA, UNSPECIFIED TYPE: ICD-10-CM

## 2024-01-03 DIAGNOSIS — E53.1: ICD-10-CM

## 2024-01-03 DIAGNOSIS — E03.9 HYPOTHYROIDISM, UNSPECIFIED TYPE: ICD-10-CM

## 2024-01-03 LAB
ANION GAP SERPL CALC-SCNC: 11 MMOL/L (ref 7–16)
APPEARANCE UR: CLEAR
BACTERIA #/AREA URNS HPF: NEGATIVE /HPF
BILIRUB UR QL STRIP.AUTO: NEGATIVE
BUN SERPL-MCNC: 24 MG/DL (ref 8–22)
CALCIUM SERPL-MCNC: 9.5 MG/DL (ref 8.5–10.5)
CHLORIDE SERPL-SCNC: 105 MMOL/L (ref 96–112)
CO2 SERPL-SCNC: 23 MMOL/L (ref 20–33)
COLOR UR: YELLOW
CREAT SERPL-MCNC: 1 MG/DL (ref 0.5–1.4)
EPI CELLS #/AREA URNS HPF: NEGATIVE /HPF
GFR SERPLBLD CREATININE-BSD FMLA CKD-EPI: 73 ML/MIN/1.73 M 2
GLUCOSE SERPL-MCNC: 89 MG/DL (ref 65–99)
GLUCOSE UR STRIP.AUTO-MCNC: NEGATIVE MG/DL
HYALINE CASTS #/AREA URNS LPF: ABNORMAL /LPF
KETONES UR STRIP.AUTO-MCNC: NEGATIVE MG/DL
LEUKOCYTE ESTERASE UR QL STRIP.AUTO: ABNORMAL
MICRO URNS: ABNORMAL
NITRITE UR QL STRIP.AUTO: NEGATIVE
PH UR STRIP.AUTO: 5.5 [PH] (ref 5–8)
POTASSIUM SERPL-SCNC: 4.4 MMOL/L (ref 3.6–5.5)
PROT UR QL STRIP: 30 MG/DL
RBC # URNS HPF: ABNORMAL /HPF
RBC UR QL AUTO: ABNORMAL
SODIUM SERPL-SCNC: 139 MMOL/L (ref 135–145)
SP GR UR STRIP.AUTO: 1.02
UROBILINOGEN UR STRIP.AUTO-MCNC: 0.2 MG/DL
WBC #/AREA URNS HPF: ABNORMAL /HPF

## 2024-01-03 PROCEDURE — 81001 URINALYSIS AUTO W/SCOPE: CPT

## 2024-01-03 PROCEDURE — 80048 BASIC METABOLIC PNL TOTAL CA: CPT

## 2024-01-03 PROCEDURE — 84439 ASSAY OF FREE THYROXINE: CPT

## 2024-01-03 PROCEDURE — 36415 COLL VENOUS BLD VENIPUNCTURE: CPT

## 2024-01-03 PROCEDURE — 84207 ASSAY OF VITAMIN B-6: CPT

## 2024-01-03 PROCEDURE — 84443 ASSAY THYROID STIM HORMONE: CPT

## 2024-01-04 ENCOUNTER — OFFICE VISIT (OUTPATIENT)
Dept: INTERNAL MEDICINE | Facility: OTHER | Age: 89
End: 2024-01-04
Payer: MEDICARE

## 2024-01-04 VITALS
DIASTOLIC BLOOD PRESSURE: 64 MMHG | TEMPERATURE: 97.7 F | OXYGEN SATURATION: 92 % | HEIGHT: 67 IN | BODY MASS INDEX: 35.66 KG/M2 | HEART RATE: 64 BPM | SYSTOLIC BLOOD PRESSURE: 102 MMHG | WEIGHT: 227.2 LBS

## 2024-01-04 DIAGNOSIS — R09.82 POST-NASAL DRIP: ICD-10-CM

## 2024-01-04 DIAGNOSIS — R31.29 OTHER MICROSCOPIC HEMATURIA: ICD-10-CM

## 2024-01-04 DIAGNOSIS — E03.9 HYPOTHYROIDISM, UNSPECIFIED TYPE: ICD-10-CM

## 2024-01-04 LAB
T4 FREE SERPL-MCNC: 0.84 NG/DL (ref 0.93–1.7)
TSH SERPL DL<=0.005 MIU/L-ACNC: 5.58 UIU/ML (ref 0.38–5.33)

## 2024-01-04 PROCEDURE — 3074F SYST BP LT 130 MM HG: CPT | Performed by: INTERNAL MEDICINE

## 2024-01-04 PROCEDURE — 1125F AMNT PAIN NOTED PAIN PRSNT: CPT | Performed by: INTERNAL MEDICINE

## 2024-01-04 PROCEDURE — 99214 OFFICE O/P EST MOD 30 MIN: CPT | Performed by: INTERNAL MEDICINE

## 2024-01-04 PROCEDURE — 3078F DIAST BP <80 MM HG: CPT | Performed by: INTERNAL MEDICINE

## 2024-01-04 RX ORDER — FLUTICASONE PROPIONATE 50 MCG
1 SPRAY, SUSPENSION (ML) NASAL DAILY
Qty: 16 G | Refills: 1 | Status: SHIPPED | OUTPATIENT
Start: 2024-01-04

## 2024-01-04 RX ORDER — LEVOTHYROXINE SODIUM 0.03 MG/1
12.5 TABLET ORAL
Qty: 30 TABLET | Refills: 1 | Status: SHIPPED | OUTPATIENT
Start: 2024-01-04 | End: 2024-02-19 | Stop reason: SDUPTHER

## 2024-01-04 ASSESSMENT — PATIENT HEALTH QUESTIONNAIRE - PHQ9
8. MOVING OR SPEAKING SO SLOWLY THAT OTHER PEOPLE COULD HAVE NOTICED. OR THE OPPOSITE, BEING SO FIGETY OR RESTLESS THAT YOU HAVE BEEN MOVING AROUND A LOT MORE THAN USUAL: NEARLY EVERY DAY
4. FEELING TIRED OR HAVING LITTLE ENERGY: MORE THAN HALF THE DAYS
3. TROUBLE FALLING OR STAYING ASLEEP OR SLEEPING TOO MUCH: SEVERAL DAYS
9. THOUGHTS THAT YOU WOULD BE BETTER OFF DEAD, OR OF HURTING YOURSELF: NOT AT ALL
6. FEELING BAD ABOUT YOURSELF - OR THAT YOU ARE A FAILURE OR HAVE LET YOURSELF OR YOUR FAMILY DOWN: NOT AL ALL
1. LITTLE INTEREST OR PLEASURE IN DOING THINGS: MORE THAN HALF THE DAYS
5. POOR APPETITE OR OVEREATING: NOT AT ALL
2. FEELING DOWN, DEPRESSED, IRRITABLE, OR HOPELESS: MORE THAN HALF THE DAYS
SUM OF ALL RESPONSES TO PHQ QUESTIONS 1-9: 12
SUM OF ALL RESPONSES TO PHQ9 QUESTIONS 1 AND 2: 4
7. TROUBLE CONCENTRATING ON THINGS, SUCH AS READING THE NEWSPAPER OR WATCHING TELEVISION: MORE THAN HALF THE DAYS

## 2024-01-04 ASSESSMENT — FIBROSIS 4 INDEX: FIB4 SCORE: 1.8

## 2024-01-04 ASSESSMENT — PAIN SCALES - GENERAL: PAINLEVEL: 4=SLIGHT-MODERATE PAIN

## 2024-01-04 NOTE — PROGRESS NOTES
Chief Complaint   Patient presents with    Thyroid Follow-up    Lab Results    Hypertension     Patient here for follow up htn,labs thyroid and labs       HPI: Sammy Yanez is a 88 y.o. male with past medical history as below  who presented to the clinic for the following.    Thyroid repeated continues to show mildly elevated TSH And low free t4   Confirmed hypothyroidism   Does have lower extremity muscle pain, faitgue depression, cognitive decline .     *Cough   -Phlegm coming from chest   -There is also nasal congestion  -Patients tries to clean out his nose before going to bed with water and he is unable to sleep without doing that   We trialed Claritin which did not make a difference on the last visit.       *Hematuria   -Has follow up with urology for CT scan   -Has history of renal carcinoma   Last UA showing WBC and RBCs   -Renal function shows improvement - BUN still slightly elevated, patient is staying well hydrated           Other pertinent problems are discussed in detail this visit include    depression, cognitive decline  Patient is Latvian only speaking, is accompanied by her daughter today.  -more depressed than normal since spouse;s death in September after 62 years of marriage   Patient had been on mirtazapine even prior to spouse's death.  -Patient has been found talking to the urn, crying a lot.  Sleeping a lot.  Rarely goes outside anymore.  -Normally he expresses his frustration regarding general matters to his wife however now he is currently living with his son and this has been difficult for him as well.  - sometimes sees things like shadows and hears basals and other sounds which are not present.  He is also is reported to have seen a light coming from the urn, and sometimes wishes that he could join his wife.   -No intent for suicide, daughter confirms that that is not in favor of his believes at all.   -Of note patient was briefly discontinued off mirtazapine due to the  possibility of it being a reactive depression secondary to wife's passing however he has had to go back on it as per daughter.  When it was first started patient was also having weight loss and decreased appetite, however his weight has been trending up based on most recent trends, BMI of 34.93 at this time.  -Started on citalopram on last visit and daughter reports that she has noticed improvement and that he is not sleeping a lot like before   Repeat PHQ in Arabic on file      *Recent fall  -Patient's daughter also mentions a fall that he has had before, reported to have some balance issues when he is standing up from a seated position or standing up from a lying down position when he feels a little spinning, patient has been staying very well-hydrated drinking very big gallons of water.  -Patient also reports that he has very bad bilateral knees, and hip pain along with low back pain.  -On examination patient does have tenderness to palpation in the lumbar midline, patient did have x-ray done after the fall which did not show any fractures but did show degenerative changes including sacroiliitis.  -On hip examination patient does have painful internal and external range of motion of the left hip in the anterior area, there is pain reported on the left hip however examination is normal today.  -Patient does have symmetrical strength on bilateral lower extremities, sensation not impacted.  -Straight leg test is negative.  -physical therapy referral was placed - however patient has declined it for this time as per daughter - however she will try to  convince him       *Essential hypertension   -Well-controlled on this visit, currently on metoprolol 150, lisinopril 5 mg.    *Obstructive sleep apnea.  -Patient is currently not using CPAP as he was not able to tolerate the machine.      *Low vitamin B6   No symptoms of malabsorption - alcohol use not discussed  Does report some falls, no neuropathy reported  Repeat is  processing       B12 normal   B1 normal     *Paroxysmal atrial fibrillation.  -Patient is currently on metoprolol, not on any anticoagulation, following up with cardiology for the same.  *Dyslipidemia-patient is on 40 mg atorvastatin.  Social history, family history not discussed on this visit.pending reestablishing visit in 6 weeks   Orthostatic dizziness       Patient Active Problem List    Diagnosis Date Noted    Other microscopic hematuria 01/04/2024    Depression 10/26/2023    Cognitive change 10/26/2023    Orthostatic dizziness 10/26/2023    Primary osteoarthritis of both knees 10/26/2023    Primary osteoarthritis of both hips 10/26/2023    Sacroiliitis (HCC) 10/26/2023    Lumbar back pain 10/26/2023    Lumbar degenerative disc disease 10/26/2023    Dysequilibrium 10/26/2023    Snoring 01/13/2023    Pure hypercholesterolemia 08/23/2022    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 16 g 1    levothyroxine (SYNTHROID) 25 MCG Tab Take 0.5 Tablets by mouth every morning on an empty stomach. 30 Tablet 1    citalopram (CELEXA) 10 MG tablet Take 1 Tablet by mouth every day. 90 Tablet 1    omeprazole (PRILOSEC) 20 MG delayed-release capsule Take 1 Capsule by mouth every day. 90 Capsule 1    atorvastatin (LIPITOR) 40 MG Tab Take 1 Tablet by mouth every day. Take 1 tablet by mouth every night at bedtime 90 Tablet 1    lisinopril (PRINIVIL) 5 MG Tab Take 1 Tablet by mouth every morning. 90 Tablet 1    pyridoxine (VITAMIN B-6) 25 MG Tab Take 1 Tablet by mouth every day. 14 Tablet 0    metoprolol SR (TOPROL XL) 50 MG TABLET SR 24 HR Take 50 mg by mouth every day.      aspirin EC (ECOTRIN) 81 MG Tablet Delayed Response Take 1 Tablet  "by mouth every day. 90 Tablet 1    metoprolol SR (TOPROL XL) 100 MG TABLET SR 24 HR at bedtime.      loratadine (CLARITIN) 10 MG Tab Take 1 Tablet by mouth every day. (Patient not taking: Reported on 1/4/2024) 30 Tablet 1     No current facility-administered medications for this visit.       ROS: As per HPI. Additional pertinent systems as noted below.  Constitutional:  Negative for fever, chills   HEENt : as in hpi  Cardiovascular:  Negative for chest pain, palpitations   Respiratory:  not short of breath however phelgm obstructing breathing sometimes as per patient    Neurologic: Negative for headaches, , seizures, positive as in HPI  MSK: Positive as in HPI      /64 (BP Location: Right arm, Patient Position: Sitting, BP Cuff Size: Large adult)   Pulse 64   Temp 36.5 °C (97.7 °F) (Temporal)   Ht 1.702 m (5' 7\")   Wt 103 kg (227 lb 3.2 oz)   SpO2 92%   BMI 35.58 kg/m²     Physical Exam   Constitutional:   Not in acute distress   HEENt : posterior pharyngeal erythema, no exudates, no PND   CVS /RESPI: normal S1 and s2 no added sounded, no murmurs, symmetrical breath sounds.   Musculoskeletal: not examined today - last visit exam in [facility]  Neurologic: Alert & oriented x 4,   Psychiatric: Judgment normal, Mood normal, Memory normal     Note: I have reviewed all pertinent labs and diagnostic tests associated with this visit with specific comments listed under the assessment and plan below    Assessment and Plan    1. Post-nasal drip  Claritin not working   Omeprazole helped with heart burn, however no change in cough and phlegm  - trial of flonase   Recommended also netipot   - fluticasone (FLONASE) 50 MCG/ACT nasal spray; Administer 1 Spray into affected nostril(S) every day.  Dispense: 16 g; Refill: 1    2. Hypothyroidism, unspecified type  -through shared decision making with daughter decided to start low dose of levothyroxine , starting at 12.5 mg   Recheck in 6 weeks  - TSH WITH REFLEX TO FT4; Future  - " levothyroxine (SYNTHROID) 25 MCG Tab; Take 0.5 Tablets by mouth every morning on an empty stomach.  Dispense: 30 Tablet; Refill: 1    3. Other microscopic hematuria  -has follow up ct scan and urology follow up appreciate recommendations. Renal function is stable      Followup: Return in about 6 weeks (around 2/15/2024).        Signed by: Jj Molina M.D.

## 2024-01-06 LAB — VIT B6 SERPL-MCNC: 165.5 NMOL/L (ref 20–125)

## 2024-01-08 ENCOUNTER — TELEPHONE (OUTPATIENT)
Dept: INTERNAL MEDICINE | Facility: OTHER | Age: 89
End: 2024-01-08
Payer: MEDICARE

## 2024-01-08 NOTE — TELEPHONE ENCOUNTER
Phone Number Called: [phone]    Call outcome:  Spoke to patient's daughter regarding below message    Message: Informed patient's daughter that vitamin b6 levels were good and patient can discontinue taking vitamin b6.

## 2024-01-08 NOTE — TELEPHONE ENCOUNTER
Jj Molina M.D.  You3 hours ago (11:33 AM)     NL  Viatmin B6 levels have normalized. He can stop taking the supplement pyridoxine (b6) if he is still taking it. For [place] Please let patient or daughter know. Patient Turkmen speaking only.    -NL

## 2024-01-09 ENCOUNTER — HOSPITAL ENCOUNTER (OUTPATIENT)
Dept: RADIOLOGY | Facility: MEDICAL CENTER | Age: 89
End: 2024-01-09
Attending: PHYSICIAN ASSISTANT
Payer: MEDICARE

## 2024-01-09 DIAGNOSIS — N28.89 OTHER SPECIFIED DISORDERS OF KIDNEY AND URETER: ICD-10-CM

## 2024-01-09 PROCEDURE — 700117 HCHG RX CONTRAST REV CODE 255: Performed by: PHYSICIAN ASSISTANT

## 2024-01-09 PROCEDURE — 74170 CT ABD WO CNTRST FLWD CNTRST: CPT

## 2024-01-09 RX ADMIN — IOHEXOL 100 ML: 350 INJECTION, SOLUTION INTRAVENOUS at 09:47

## 2024-02-07 ENCOUNTER — HOSPITAL ENCOUNTER (OUTPATIENT)
Dept: LAB | Facility: MEDICAL CENTER | Age: 89
End: 2024-02-07
Attending: PHYSICIAN ASSISTANT
Payer: MEDICARE

## 2024-02-07 PROCEDURE — 87086 URINE CULTURE/COLONY COUNT: CPT

## 2024-02-08 ENCOUNTER — PHYSICAL THERAPY (OUTPATIENT)
Dept: PHYSICAL THERAPY | Facility: REHABILITATION | Age: 89
End: 2024-02-08
Attending: INTERNAL MEDICINE
Payer: MEDICARE

## 2024-02-08 DIAGNOSIS — M46.1 SACROILIITIS (HCC): ICD-10-CM

## 2024-02-08 DIAGNOSIS — M17.0 PRIMARY OSTEOARTHRITIS OF BOTH KNEES: ICD-10-CM

## 2024-02-08 DIAGNOSIS — M16.0 PRIMARY OSTEOARTHRITIS OF BOTH HIPS: ICD-10-CM

## 2024-02-08 DIAGNOSIS — M51.36 LUMBAR DEGENERATIVE DISC DISEASE: ICD-10-CM

## 2024-02-08 PROCEDURE — 97110 THERAPEUTIC EXERCISES: CPT

## 2024-02-08 PROCEDURE — 97161 PT EVAL LOW COMPLEX 20 MIN: CPT

## 2024-02-08 ASSESSMENT — ENCOUNTER SYMPTOMS
PAIN SCALE AT LOWEST: 0
PAIN SCALE AT HIGHEST: 8
PAIN SCALE: 0

## 2024-02-09 LAB
BACTERIA UR CULT: NORMAL
SIGNIFICANT IND 70042: NORMAL
SITE SITE: NORMAL
SOURCE SOURCE: NORMAL

## 2024-02-09 NOTE — OP THERAPY EVALUATION
Outpatient Physical Therapy  INITIAL EVALUATION    [facility] [address]  [address]  [facility] [address]  Phone:  [phone]  Fax:  [phone]    Date of Evaluation: 2024    Patient: Sammy Yanez  YOB: 1935  MRN: [id number]     Referring Provider: Jj Molina M.D.  [address] [place] [address]   Referring Diagnosis Primary osteoarthritis of both knees [M17.0];Primary osteoarthritis of both hips [M16.0];Sacroiliitis (HCC) [M46.1];Lumbar degenerative disc disease [M51.36]     Time Calculation  Start time: 0415  Stop time: 0500 Time Calculation (min): 45 minutes         Chief Complaint: No chief complaint on file.    Visit Diagnoses     ICD-10-CM   1. Primary osteoarthritis of both knees  M17.0   2. Primary osteoarthritis of both hips  M16.0   3. Sacroiliitis (HCC)  M46.1   4. Lumbar degenerative disc disease  M51.36       Date of onset of impairment: 2023    Subjective   History of Present Illness:     History of chief complaint:  Patient reports that he has been having knee pain for years.  Patient reports that he has been struggling hematauria w/ significant pain.  Patient is undergoing medical treatment for prostate enlargement and prostatitis .  Patient also reports increased bouts of dizziness with recent episode of hematuria (  have been).  Patient also reports . Patient reports one fall in the past 6 months.  Patient reports that he uses furniture to walk in his apartment.  Pt. Lives opn 2nd floor with 1 flight of stairs (16)    Prior level of function:  Retied    Pain:     Current pain ratin    At best pain ratin    At worst pain ratin    Location:  Patient reports bialteral ant thigh pain that are worse with standing after sitting and is better with walking    Aggravating factors:  Sit stand w/o pain  Patient reports pain with going up one step and needs the rail to climb stairs  Patient able to control sit-stand  "form 16\"   LEFS 19/80  30\" sit andrade 5 attempts  6' walk with spc  540ft        Past Medical History:   Diagnosis Date    Arrhythmia     Arthritis 09/07/2021    Lumbar area and shoulders.    Cataract     Bilateral IOL's.    COPD (chronic obstructive pulmonary disease) ([facility])     9/7/2021 - states pt not diagnosed with COPD, pulmonary referrel pending, uses inhaler daily at this time.    Dental disorder     Several teeth missing/pulled.    High cholesterol 09/07/2021    No medication at this time, daughter will follow up with PCP.    History of UTI     Hypertension     Psychiatric problem 09/07/2021    Bipolar - no medication.    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     Past Surgical History:   Procedure Laterality Date    OTHER  2004    \"Prostate cleaned out\"    APPENDECTOMY      Around age 35-40.    CHOLECYSTECTOMY      Around age 35-40.       Precautions:  Fall Risk: Moderate Fall Risk    Objective      Therapeutic Treatments and Modalities:     Therapeutic Treatment and Modalities Summary: Ball roll  in/out  Ball bridge x 30\" x 2// legs \"tired\" not worse  Isntructed use and adjusted height for spc    Time-based treatments/modalities:    Physical Therapy Timed Treatment Charges  Therapeutic exercise minutes (CPT 25887): 10 minutes      Assessment, Response and Plan:   Assessment details:  Patient presentc/o bilateral l.e. pain  and weakness with standing and stairs  with functional squatting activities.  Patient present with decreased arm swing, decreased stride, decrease transverse plane pelvic motion and L>R l.e. ER  with ambulation.  Patient demonstrates weak posterior chain  and posterio-lateral chain strength L>R. Patient presents as moderate fall risk and limited balance reactions  Patient should do well for goals if compliant with poc.   Prognosis: fair    Goals:   Short Term Goals:   Sit stand w/o pain  up one step w/o  rail to climb   Patient able to control sit-stand from 16\"  " "w/ use of u.e.  LEFS > 30/80  30\" sit stand >7 attempts  6' walk with spc  700ft  Short term goal time span:  2-4 weeks      Long Term Goals:      up 5  step w/  spc and no rail    LEFS > 50/80  30\" sit stand >9attempts  6' walk with spc  850 ft  Long term goal time span:  6-8 weeks    Plan:   Therapy options:  Physical therapy treatment to continue  Planned therapy interventions:  Manual Therapy (CPT 38654), Neuromuscular Re-education (CPT 60984), Therapeutic Exercise (CPT 80230), Gait Training (CPT 08808) and E Stim Unattended (CPT 04497)  Frequency:  2x week  Duration in weeks:  8  Duration in visits:  12  Plan details:  Psot chain strength, core strength, l.e. strength, balance trg, neuro re-ed, es-tomi      Functional Assessment Used  Lower Extremity Functional Scale Percentage: 23.75     Referring provider co-signature:  I have reviewed this plan of care and my co-signature certifies the need for services.    Certification Period: 02/08/2024 to  04/12/24    Physician Signature: ________________________________ Date: ______________          "

## 2024-02-10 ENCOUNTER — HOSPITAL ENCOUNTER (OUTPATIENT)
Dept: LAB | Facility: MEDICAL CENTER | Age: 89
End: 2024-02-10
Attending: INTERNAL MEDICINE
Payer: MEDICARE

## 2024-02-10 DIAGNOSIS — E03.9 HYPOTHYROIDISM, UNSPECIFIED TYPE: ICD-10-CM

## 2024-02-10 LAB — TSH SERPL DL<=0.005 MIU/L-ACNC: 4.11 UIU/ML (ref 0.38–5.33)

## 2024-02-10 PROCEDURE — 84443 ASSAY THYROID STIM HORMONE: CPT

## 2024-02-10 PROCEDURE — 36415 COLL VENOUS BLD VENIPUNCTURE: CPT

## 2024-02-14 ENCOUNTER — OFFICE VISIT (OUTPATIENT)
Dept: INTERNAL MEDICINE | Facility: OTHER | Age: 89
End: 2024-02-14
Payer: MEDICARE

## 2024-02-14 VITALS
HEIGHT: 67 IN | HEART RATE: 61 BPM | OXYGEN SATURATION: 94 % | TEMPERATURE: 97.5 F | BODY MASS INDEX: 35.09 KG/M2 | SYSTOLIC BLOOD PRESSURE: 123 MMHG | WEIGHT: 223.6 LBS | DIASTOLIC BLOOD PRESSURE: 62 MMHG

## 2024-02-14 DIAGNOSIS — Z85.528 HISTORY OF RENAL CELL CANCER: ICD-10-CM

## 2024-02-14 DIAGNOSIS — R31.9 HEMATURIA, UNSPECIFIED TYPE: ICD-10-CM

## 2024-02-14 DIAGNOSIS — R09.82 POST-NASAL DRIP: ICD-10-CM

## 2024-02-14 DIAGNOSIS — E03.9 HYPOTHYROIDISM, UNSPECIFIED TYPE: ICD-10-CM

## 2024-02-14 PROCEDURE — 99214 OFFICE O/P EST MOD 30 MIN: CPT | Performed by: INTERNAL MEDICINE

## 2024-02-14 PROCEDURE — 3074F SYST BP LT 130 MM HG: CPT | Performed by: INTERNAL MEDICINE

## 2024-02-14 PROCEDURE — 3078F DIAST BP <80 MM HG: CPT | Performed by: INTERNAL MEDICINE

## 2024-02-14 ASSESSMENT — FIBROSIS 4 INDEX: FIB4 SCORE: 1.8

## 2024-02-14 NOTE — PROGRESS NOTES
Chief Complaint   Patient presents with    Follow-Up    Painful Urination     At time painful with blood       HPI: Sammy Yanez is a 88 y.o. male with past medical history as below  who presented to the clinic for the following.    *Hypothyroidism   On 12.5mcg of levothyroxine   Doing well overall   Last TSH wnl     Did  have lower extremity muscle pain, faitgue depression, cognitive decline . - better overall - also started seeing PT     *Cough   -claritin ineffective   Floanse has helped a lot - patient doing well       *Hematuria   -History of RCC status post ablation   Last UA showing WBC and RBCs   -Renal function shows improvement - BUN still slightly elevated, patient is staying well hydrated   Patient also had a CT scan through urology that did not show any new mass.   Pending cystoscopy however not until a April   Patient is continuing to have hematuria  which is now getting worse - darker - with clots that cause pain sometimes. This continues to be intermittent though   On examination today - no flank pain, mild suprapubic discomfort - no urine symptoms otherwise at this time .   Patient's daughter wanting to know if she could get a second opinion or if she needs to go to hospital           Other problems not discussed on this visit include     depression, cognitive decline  Patient is Romansh only speaking, is accompanied by her daughter today.  -more depressed than normal since spouse;s death in September after 62 years of marriage   Patient had been on mirtazapine even prior to spouse's death.  -Patient has been found talking to the urn, crying a lot.  Sleeping a lot.  Rarely goes outside anymore.  -Normally he expresses his frustration regarding general matters to his wife however now he is currently living with his son and this has been difficult for him as well.  - sometimes sees things like shadows and hears basals and other sounds which are not present.  He is also is reported to have  seen a light coming from the urn, and sometimes wishes that he could join his wife.   -No intent for suicide, daughter confirms that that is not in favor of his believes at all.   -Of note patient was briefly discontinued off mirtazapine due to the possibility of it being a reactive depression secondary to wife's passing however he has had to go back on it as per daughter.  When it was first started patient was also having weight loss and decreased appetite, however his weight has been trending up based on most recent trends, BMI of 34.93 at this time.  -Started on citalopram on last visit and daughter reports that she has noticed improvement and that he is not sleeping a lot like before   Repeat PHQ in [place] on file      *Recent fall  -Patient's daughter also mentions a fall that he has had before, reported to have some balance issues when he is standing up from a seated position or standing up from a lying down position when he feels a little spinning, patient has been staying very well-hydrated drinking very big gallons of water.  -Patient also reports that he has very bad bilateral knees, and hip pain along with low back pain.  -On examination patient does have tenderness to palpation in the lumbar midline, patient did have x-ray done after the fall which did not show any fractures but did show degenerative changes including sacroiliitis.  -On hip examination patient does have painful internal and external range of motion of the left hip in the anterior area, there is pain reported on the left hip however examination is normal today.  -Patient does have symmetrical strength on bilateral lower extremities, sensation not impacted.  -Straight leg test is negative.  -physical therapy referral was placed - however patient has declined it for this time as per daughter - however she will try to  convince him       *Essential hypertension   -Well-controlled on this visit, currently on metoprolol 150, lisinopril 5  mg.    *Obstructive sleep apnea.  -Patient is currently not using CPAP as he was not able to tolerate the machine.      *Low vitamin B6   No symptoms of malabsorption - alcohol use not discussed  Does report some falls, no neuropathy reported  Repeat - high - supplement stopped     B12 normal   B1 normal     *Paroxysmal atrial fibrillation.  -Patient is currently on metoprolol, not on any anticoagulation, following up with cardiology for the same.  *Dyslipidemia-patient is on 40 mg atorvastatin.  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 45 Tablet 3    fluticasone (FLONASE) 50 MCG/ACT nasal spray Administer 1 Spray into affected nostril(S) every day. 16 g 1    citalopram (CELEXA) 10 MG tablet Take 1 Tablet by mouth every day. 90 Tablet 1    atorvastatin (LIPITOR) 40 MG Tab Take 1 Tablet by mouth every day. Take 1 tablet by mouth every night at bedtime 90 Tablet 1    lisinopril (PRINIVIL) 5 MG Tab Take 1 Tablet by mouth every morning. 90 Tablet 1    metoprolol SR  "(TOPROL XL) 50 MG TABLET SR 24 HR Take 50 mg by mouth every day.      aspirin EC (ECOTRIN) 81 MG Tablet Delayed Response Take 1 Tablet by mouth every day. 90 Tablet 1    metoprolol SR (TOPROL XL) 100 MG TABLET SR 24 HR at bedtime.       No current facility-administered medications for this visit.       ROS: As per HPI. Additional pertinent systems as noted below.  Constitutional:  Negative for fever, chills   Cardiovascular:  Negative for chest pain, palpitations   Respiratory:  not short of breath      : as in hpi     /62 (BP Location: Left arm, Patient Position: Sitting, BP Cuff Size: Adult)   Pulse 61   Temp 36.4 °C (97.5 °F) (Temporal)   Ht 1.702 m (5' 7\")   Wt 101 kg (223 lb 9.6 oz)   SpO2 94%   BMI 35.02 kg/m²     Physical Exam   Constitutional:   Not in acute distress   CVS /RESPI: normal S1 and s2 no added sounded, no murmurs, symmetrical breath sounds.   Musculoskeletal: not examined today - last visit exam in hpi   : On examination today - no flank pain, mild suprapubic discomfort - no urine symptoms otherwise at this time .   Patient's daughter wanting to know if she could get a second opinion or if she needs to go to hospital     Note: I have reviewed all pertinent labs and diagnostic tests associated with this visit with specific comments listed under the assessment and plan below    Assessment and Plan    1. Hematuria, unspecified type  - Will request for records from urology   Referring to [facility] for second opinion and see if there is an earlier schedule for cystoscopy   Patient given ER precautions with the symptoms that he is experiencing   Low suspicion for UTI at this time  - Referral to Urology    2. History of renal cell cancer    - Referral to Urology    3. Hypothyroidism, unspecified type  Continue current dose of 12.5mcg- check again in 3 months - TSH   - levothyroxine (SYNTHROID) 25 MCG Tab; Take 0.5 Tablets by mouth every morning on an empty stomach.  Dispense: 45 " Tablet; Refill: 3    4. Post-nasal drip  Well controlled with flonase - continue as needed use        Followup: Return in about 3 weeks (around 3/6/2024).        Signed by: Jj Molina M.D.

## 2024-02-15 ENCOUNTER — PHYSICAL THERAPY (OUTPATIENT)
Dept: PHYSICAL THERAPY | Facility: REHABILITATION | Age: 89
End: 2024-02-15
Attending: INTERNAL MEDICINE
Payer: MEDICARE

## 2024-02-15 DIAGNOSIS — M51.36 LUMBAR DEGENERATIVE DISC DISEASE: ICD-10-CM

## 2024-02-15 PROCEDURE — 97110 THERAPEUTIC EXERCISES: CPT

## 2024-02-15 PROCEDURE — 97116 GAIT TRAINING THERAPY: CPT

## 2024-02-16 NOTE — OP THERAPY DAILY TREATMENT
"  Outpatient Physical Therapy  DAILY TREATMENT     [facility] Physical Therapy [address].  [address]  [place] [address]  Phone:  [phone]  Fax:  [phone]    Date: 02/15/2024    Patient: Sammy Yanez  YOB: 1935  MRN: [id number]     Time Calculation    Start time: 0415  Stop time: 0445 Time Calculation (min): 30 minutes         Chief Complaint: No chief complaint on file.    Visit #: 2    SUBJECTIVE:  Feels better and stronger... ex regularly ea. day    OBJECTIVE:            Therapeutic Treatments and Modalities:     Therapeutic Treatment and Modalities Summary: Bridge x 2'  Bridge marching  Bridge with abd band holds x 2'  S/l planks w/ clams w #2 3 x 10-15\"  Clams #2 band 4 x 30\"  Gait trg with spc and arm swing  Adjusted cane height    Time-based treatments/modalities:    Physical Therapy Timed Treatment Charges  Gait training minutes (CPT 36055): 10 minutes  Therapeutic exercise minutes (CPT 09998): 20 minutes      Pain rating (1-10) before treatment:    Pain rating (1-10) after treatment:      ASSESSMENT:   Improving strength and unction,  limited lateral hip strength  Imrpved yoan and stride length after gait trg    PLAN/RECOMMENDATIONS:   Core stab, hip stab, balance stab, gait trg          "

## 2024-02-19 PROBLEM — R09.82 POST-NASAL DRIP: Status: ACTIVE | Noted: 2024-02-19

## 2024-02-19 PROBLEM — E03.9 HYPOTHYROID: Status: ACTIVE | Noted: 2024-02-19

## 2024-02-19 RX ORDER — LEVOTHYROXINE SODIUM 0.03 MG/1
12.5 TABLET ORAL
Qty: 45 TABLET | Refills: 3 | Status: SHIPPED | OUTPATIENT
Start: 2024-02-19

## 2024-02-20 ENCOUNTER — PHYSICAL THERAPY (OUTPATIENT)
Dept: PHYSICAL THERAPY | Facility: REHABILITATION | Age: 89
End: 2024-02-20
Attending: INTERNAL MEDICINE
Payer: MEDICARE

## 2024-02-20 DIAGNOSIS — M51.36 LUMBAR DEGENERATIVE DISC DISEASE: ICD-10-CM

## 2024-02-20 PROCEDURE — 97110 THERAPEUTIC EXERCISES: CPT

## 2024-02-21 NOTE — OP THERAPY DAILY TREATMENT
"  Outpatient Physical Therapy  DAILY TREATMENT     [facility] Physical Therapy [address]  [address]  [place] [address]  Phone:  [phone]  Fax:  [phone]    Date: 02/20/2024    Patient: Sammy Yanez  YOB: 1935  MRN: [id number]     Time Calculation    Start time: 0425  Stop time: 0455 Time Calculation (min): 30 minutes         Chief Complaint: No chief complaint on file.    Visit #: 3    SUBJECTIVE:  Feels better and stronger... ex regularly ea. day    OBJECTIVE:            Therapeutic Treatments and Modalities:     Therapeutic Treatment and Modalities Summary: Bridge x 2'  Toy soldier #2 --hep   Marching x 1'  Bridge marching  Bridge with abd band holds x 2'  Nu=step level 3 x 5 90spm  Clams #2 band 4 x 30\"  Sit-stand mechanics with toes over nose    Time-based treatments/modalities:    Physical Therapy Timed Treatment Charges  Therapeutic exercise minutes (CPT 80831): 30 minutes      Pain rating (1-10) before treatment:    Pain rating (1-10) after treatment:      ASSESSMENT:   Improving strength and unction,  limited lateral hip strength  Improved yoan and stride length after gait trg    PLAN/RECOMMENDATIONS:   Core stab, hip stab, balance stab, gait trg          "

## 2024-02-22 ENCOUNTER — APPOINTMENT (OUTPATIENT)
Dept: PHYSICAL THERAPY | Facility: REHABILITATION | Age: 89
End: 2024-02-22
Attending: INTERNAL MEDICINE
Payer: MEDICARE

## 2024-02-27 ENCOUNTER — PHYSICAL THERAPY (OUTPATIENT)
Dept: PHYSICAL THERAPY | Facility: REHABILITATION | Age: 89
End: 2024-02-27
Attending: INTERNAL MEDICINE
Payer: MEDICARE

## 2024-02-27 DIAGNOSIS — M51.36 LUMBAR DEGENERATIVE DISC DISEASE: ICD-10-CM

## 2024-02-27 PROCEDURE — 97116 GAIT TRAINING THERAPY: CPT

## 2024-02-27 PROCEDURE — 97112 NEUROMUSCULAR REEDUCATION: CPT

## 2024-02-28 NOTE — OP THERAPY DAILY TREATMENT
"  Outpatient Physical Therapy  DAILY TREATMENT     [facility] Physical [address]  [address]  [place] [address]  Phone:  [phone]  Fax:  [phone]    Date: 02/27/2024    Patient: Sammy Yanez  YOB: 1935  MRN: [id number]     Time Calculation    Start time: 0415  Stop time: 0445 Time Calculation (min): 30 minutes         Chief Complaint: No chief complaint on file.    Visit #: 4    SUBJECTIVE:  Continues to Feel better and stronger... ex regularly ea. Day and is not using a cane because he does not feel that he needs one/.  Pt/. Reports that he is able to get out of a chair w/o dificutly    OBJECTIVE:            Therapeutic Treatments and Modalities:     Therapeutic Treatment and Modalities Summary: Blue bosu balance wth band assist and cga x 5'  Black bosu with wt. Shift --a/p and lateral with focus on hip dissociation  Tandem gait trg.  Wobble board with a/p and laeral challenges with focus on hip dissociation and akle stretegies  Gait trg on 4\" airex foam beam w/ marielena/cga forrwad backwards x 30 ft with fww// pt. Reported too much fear to try walking on beam w/o a walker    Time-based treatments/modalities:    Physical Therapy Timed Treatment Charges  Gait training minutes (CPT 10715): 15 minutes  Neuromusc re-ed, balance, coor, post minutes (CPT 93062): 15 minutes      Pain rating (1-10) before treatment:    Pain rating (1-10) after treatment:      ASSESSMENT:   Improving strength and function,  limited ankle and hip strategies but improved throughout treatment    PLAN/RECOMMENDATIONS:   Core stab, hip stab, balance stab, gait trg          "

## 2024-02-29 ENCOUNTER — PHYSICAL THERAPY (OUTPATIENT)
Dept: PHYSICAL THERAPY | Facility: REHABILITATION | Age: 89
End: 2024-02-29
Attending: INTERNAL MEDICINE
Payer: MEDICARE

## 2024-02-29 ENCOUNTER — OFFICE VISIT (OUTPATIENT)
Dept: INTERNAL MEDICINE | Facility: OTHER | Age: 89
End: 2024-02-29
Payer: MEDICARE

## 2024-02-29 VITALS
TEMPERATURE: 98.3 F | HEART RATE: 74 BPM | WEIGHT: 224 LBS | BODY MASS INDEX: 35.16 KG/M2 | RESPIRATION RATE: 16 BRPM | OXYGEN SATURATION: 94 % | HEIGHT: 67 IN | SYSTOLIC BLOOD PRESSURE: 131 MMHG | DIASTOLIC BLOOD PRESSURE: 79 MMHG

## 2024-02-29 DIAGNOSIS — F32.9 REACTIVE DEPRESSION: ICD-10-CM

## 2024-02-29 DIAGNOSIS — I48.91 ATRIAL FIBRILLATION, UNSPECIFIED TYPE (HCC): ICD-10-CM

## 2024-02-29 DIAGNOSIS — R05.3 CHRONIC COUGH: ICD-10-CM

## 2024-02-29 DIAGNOSIS — M51.36 LUMBAR DEGENERATIVE DISC DISEASE: ICD-10-CM

## 2024-02-29 DIAGNOSIS — E03.9 HYPOTHYROIDISM, UNSPECIFIED TYPE: ICD-10-CM

## 2024-02-29 DIAGNOSIS — R41.89 COGNITIVE CHANGE: ICD-10-CM

## 2024-02-29 DIAGNOSIS — R09.82 POST-NASAL DRIP: ICD-10-CM

## 2024-02-29 PROCEDURE — G0439 PPPS, SUBSEQ VISIT: HCPCS | Performed by: INTERNAL MEDICINE

## 2024-02-29 PROCEDURE — 3075F SYST BP GE 130 - 139MM HG: CPT | Performed by: INTERNAL MEDICINE

## 2024-02-29 PROCEDURE — 3078F DIAST BP <80 MM HG: CPT | Performed by: INTERNAL MEDICINE

## 2024-02-29 PROCEDURE — 97116 GAIT TRAINING THERAPY: CPT

## 2024-02-29 PROCEDURE — 97112 NEUROMUSCULAR REEDUCATION: CPT

## 2024-02-29 RX ORDER — FINASTERIDE 5 MG/1
1 TABLET, FILM COATED ORAL
COMMUNITY

## 2024-02-29 RX ORDER — GUAIFENESIN 600 MG/1
600 TABLET, EXTENDED RELEASE ORAL EVERY 12 HOURS PRN
Qty: 60 TABLET | Refills: 1 | Status: SHIPPED | OUTPATIENT
Start: 2024-02-29

## 2024-02-29 RX ORDER — MIRTAZAPINE 15 MG/1
1 TABLET, FILM COATED ORAL EVERY EVENING
COMMUNITY

## 2024-02-29 RX ORDER — FEXOFENADINE HCL 60 MG/1
60 TABLET, FILM COATED ORAL EVERY 12 HOURS PRN
Qty: 60 TABLET | Refills: 1 | Status: SHIPPED | OUTPATIENT
Start: 2024-02-29

## 2024-02-29 RX ORDER — PHENAZOPYRIDINE HYDROCHLORIDE 200 MG/1
1 TABLET, FILM COATED ORAL 3 TIMES DAILY
COMMUNITY

## 2024-02-29 ASSESSMENT — FIBROSIS 4 INDEX: FIB4 SCORE: 1.8

## 2024-02-29 ASSESSMENT — PATIENT HEALTH QUESTIONNAIRE - PHQ9
CLINICAL INTERPRETATION OF PHQ2 SCORE: 2
5. POOR APPETITE OR OVEREATING: 0 - NOT AT ALL

## 2024-02-29 ASSESSMENT — ENCOUNTER SYMPTOMS: GENERAL WELL-BEING: EXCELLENT

## 2024-02-29 ASSESSMENT — ACTIVITIES OF DAILY LIVING (ADL): BATHING_REQUIRES_ASSISTANCE: 0

## 2024-02-29 NOTE — PROGRESS NOTES
No chief complaint on file.      HPI: Sammy Yanez is a 88 y.o. male with past medical history as below  who presented to the clinic for the following.    *Hypothyroidism   On 12.5mcg of levothyroxine   Doing well overall   Last TSH wnl     Did  have lower extremity muscle pain, faitgue depression, cognitive decline . - better overall - also started seeing PT     *Cough   -claritin ineffective   Floanse has helped a lot - patient doing well       *Hematuria   -History of RCC status post ablation   Last UA showing WBC and RBCs   -Renal function shows improvement - BUN still slightly elevated, patient is staying well hydrated   Patient also had a CT scan through urology that did not show any new mass.   Pending cystoscopy however not until a April   Patient is continuing to have hematuria  which is now getting worse - darker - with clots that cause pain sometimes. This continues to be intermittent though   On examination today - no flank pain, mild suprapubic discomfort - no urine symptoms otherwise at this time .   Patient's daughter wanting to know if she could get a second opinion or if she needs to go to hospital           Other problems not discussed on this visit include     depression, cognitive decline  Patient is [place]er only speaking, is accompanied by her daughter today.  -more depressed than normal since spouse;s death in September after 62 years of marriage   Patient had been on mirtazapine even prior to spouse's death.  -Patient has been found talking to the urn, crying a lot.  Sleeping a lot.  Rarely goes outside anymore.  -Normally he expresses his frustration regarding general matters to his wife however now he is currently living with his son and this has been difficult for him as well.  - sometimes sees things like shadows and hears basals and other sounds which are not present.  He is also is reported to have seen a light coming from the urn, and sometimes wishes that he could join his  wife.   -No intent for suicide, daughter confirms that that is not in favor of his believes at all.   -Of note patient was briefly discontinued off mirtazapine due to the possibility of it being a reactive depression secondary to wife's passing however he has had to go back on it as per daughter.  When it was first started patient was also having weight loss and decreased appetite, however his weight has been trending up based on most recent trends, BMI of 34.93 at this time.  -Started on citalopram on last visit and daughter reports that she has noticed improvement and that he is not sleeping a lot like before   Repeat PHQ in [place] on file      *Recent fall  -Patient's daughter also mentions a fall that he has had before, reported to have some balance issues when he is standing up from a seated position or standing up from a lying down position when he feels a little spinning, patient has been staying very well-hydrated drinking very big gallons of water.  -Patient also reports that he has very bad bilateral knees, and hip pain along with low back pain.  -On examination patient does have tenderness to palpation in the lumbar midline, patient did have x-ray done after the fall which did not show any fractures but did show degenerative changes including sacroiliitis.  -On hip examination patient does have painful internal and external range of motion of the left hip in the anterior area, there is pain reported on the left hip however examination is normal today.  -Patient does have symmetrical strength on bilateral lower extremities, sensation not impacted.  -Straight leg test is negative.  -physical therapy referral was placed - however patient has declined it for this time as per daughter - however she will try to  convince him       *Essential hypertension   -Well-controlled on this visit, currently on metoprolol 150, lisinopril 5 mg.    *Obstructive sleep apnea.  -Patient is currently not using CPAP as he was  not able to tolerate the machine.      *Low vitamin B6   No symptoms of malabsorption - alcohol use not discussed  Does report some falls, no neuropathy reported  Repeat - high - supplement stopped     B12 normal   B1 normal     *Paroxysmal atrial fibrillation.  -Patient is currently on metoprolol, not on any anticoagulation, following up with cardiology for the same.  *Dyslipidemia-patient is on 40 mg atorvastatin.  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 45 Tablet 3    fluticasone (FLONASE) 50 MCG/ACT nasal spray Administer 1 Spray into affected nostril(S) every day. 16 g 1    citalopram (CELEXA) 10 MG tablet Take 1 Tablet by mouth every day. 90 Tablet 1    atorvastatin (LIPITOR) 40 MG Tab Take 1 Tablet by mouth every day. Take 1 tablet by mouth every night at bedtime 90 Tablet 1    lisinopril (PRINIVIL) 5 MG Tab Take 1 Tablet by mouth every morning. 90 Tablet 1    metoprolol SR (TOPROL XL) 50 MG TABLET SR 24 HR Take 50 mg by mouth every day.      aspirin EC  (ECOTRIN) 81 MG Tablet Delayed Response Take 1 Tablet by mouth every day. 90 Tablet 1    metoprolol SR (TOPROL XL) 100 MG TABLET SR 24 HR at bedtime.       No current facility-administered medications for this visit.       ROS: As per [facility]. Additional pertinent systems as noted below.  Constitutional:  Negative for fever, chills   Cardiovascular:  Negative for chest pain, palpitations   Respiratory:  not short of breath      : as in \[facility]\""     There were no vitals taken for this visit.    Physical Exam   Constitutional:   Not in acute distress   CVS /RESPI: normal S1 and s2 no added sounded, no murmurs, symmetrical breath sounds.   Musculoskeletal: not examined today - last visit exam in \[facility]\""   : On examination today - no flank pain, mild suprapubic discomfort - no urine symptoms otherwise at this time .   Patient's daughter wanting to know if she could get a second opinion or if she needs to go to hospital     Note: I have reviewed all pertinent labs and diagnostic tests associated with this visit with specific comments listed under the assessment and plan below    Assessment and Plan    1. Hematuria, unspecified type  - Will request for records from urology   Referring to [facility] urology for second opinion and see if there is an earlier schedule for cystoscopy   Patient given ER precautions with the symptoms that he is experiencing   Low suspicion for UTI at this time  - Referral to Urology    2. History of renal cell cancer    - Referral to Urology    3. Hypothyroidism, unspecified type  Continue current dose of 12.5mcg- check again in 3 months - TSH   - levothyroxine (SYNTHROID) 25 MCG Tab; Take 0.5 Tablets by mouth every morning on an empty stomach.  Dispense: 45 Tablet; Refill: 3    4. Post-nasal drip  Well controlled with flonase - continue as needed use        Followup: No follow-ups on file.        Signed by: Jj Molina M.D.

## 2024-02-29 NOTE — OP THERAPY DAILY TREATMENT
"  Outpatient Physical Therapy  DAILY TREATMENT     [facility] Physical Therapy [address]  [address]  [place] [address]  Phone:  [phone]  Fax:  [phone]    Date: 02/29/2024    Patient: Sammy Yanez  YOB: 1935  MRN: [id number]     Time Calculation    Start time: 0250  Stop time: 0330 Time Calculation (min): 40 minutes         Chief Complaint: No chief complaint on file.    Visit #: 5    SUBJECTIVE:  Feels much better but is still scared of falling outside    OBJECTIVE:            Therapeutic Treatments and Modalities:     Therapeutic Treatment and Modalities Summary: Gait trg on 4\" airex--forward/backwards and sidways  Gait trg on 2x4 near wall with finger drag with EC/EO forward/backwards and sideways--hep  Sit-stand form chair wih 4\" ariex pad x 5 w/o u.e. assist  Corner tandem hip strategy--hep  A/p wall hip strategies    Time-based treatments/modalities:    Physical Therapy Timed Treatment Charges  Gait training minutes (CPT 19436): 20 minutes  Neuromusc re-ed, balance, coor, post minutes (CPT 04687): 20 minutes      Pain rating (1-10) before treatment:    Pain rating (1-10) after treatment:      ASSESSMENT:   Significant improvement with dynamic balance and decreased fear avoidance--cont. To struggle with tandem balance strategies but improving.  Patient denies any back pain for he past week.  Patient reprots taht he has not lost his balance and does not use a cane at home    PLAN/RECOMMENDATIONS:   Core stab, hip stab, balance stab, gait trg --balance strategies against wall         "

## 2024-03-01 NOTE — PROGRESS NOTES
Chief Complaint   Patient presents with    Annual Wellness Visit       HPI:  Sammy Yanez is a 88 y.o. here for Medicare Annual Wellness Visit        *Hypothyroidism   On 12.5mcg of levothyroxine   Doing well overall   Last TSH wnl -repeat will be done in 6 weeks     Did  have lower extremity muscle pain, faitgue depression, cognitive decline . -Significantly better as per daughter with physical therapy for    *Paroxysmal atrial fibrillation.  -Patient is currently on metoprolol, not on any anticoagulation, following up with cardiology for the same.  -Patient does have this chronic cough however not associated with any palpitations.  -On examination patient is in sinus rhythm, lung examination unremarkable     *Cough   -Associated with phlegm in the back of his throat, nasal congestion  -claritin ineffective   Floanse has helped a lot - patient doing well   -Patient is however still having cough while he is talking in long sentences, and also otherwise during the day as well as during the night without much significant change at night.  Denies any orthopnea, PND.  -Denies any shortness of breath otherwise, denies any wheezing.  -Patient has brought with her daughter over-the-counter NyQuil DayQuil.  -On examination there is no exudate appreciated, lungs are symmetrical breath sounds, no added sounds.        *Hematuria   -Resolved  -History of RCC status post ablation   Last UA showing WBC and RBCs   -Renal function shows improvement - BUN still slightly elevated, patient is staying well hydrated   Patient also had a CT scan through urology that did not show any new mass.   -Patient has received appointment with new neurologist however since resolution of hematuria at the decided to wait for the cystoscopy is scheduled for April  On examination today - no flank pain, no suprapubic tenderness.      *Fall history.- none recently  -Patient reported that he has very bad bilateral knees, and hip pain along with low  back pain.  -On examination patient did have tenderness to palpation in the lumbar midline, patient did have x-ray done after the fall which did not show any fractures but did show degenerative changes including sacroiliitis.  -On hip examination patient did  have painful internal and external range of motion of the left hip in the anterior area, there was pain reported on the left hip however examination is normal today.  -Patient did have symmetrical strength on bilateral lower extremities, sensation not impacted.  -Straight leg test was negative.  -physical therapy referral was placed -she has not pursued physical therapy with significant benefit, no falls since, patient is able to get down from the examination table without much difficulty compared to before, required slight assistance to climb up on the examination table.         Other problems not discussed on this visit include      depression, cognitive decline  Patient is Welsh only speaking, is accompanied by her daughter today.  -more depressed than normal since spouse;s death in September after 62 years of marriage   Patient had been on mirtazapine even prior to spouse's death.  -Patient has been found talking to the urn, crying a lot.  Sleeping a lot.  Rarely goes outside anymore.  -Normally he expresses his frustration regarding general matters to his wife however now he is currently living with his son and this has been difficult for him as well.  - sometimes sees things like shadows and hears basals and other sounds which are not present.  He is also is reported to have seen a light coming from the urn, and sometimes wishes that he could join his wife.   -No intent for suicide, daughter confirms that that is not in favor of his believes at all.   -Of note patient was briefly discontinued off mirtazapine due to the possibility of it being a reactive depression secondary to wife's passing however he has had to go back on it as per daughter.  When it  was first started patient was also having weight loss and decreased appetite, however his weight has been trending up based on most recent trends, BMI of 34.93 at this time.  -Started on citalopram on last visit and daughter reports that she has noticed improvement and that he is not sleeping a lot like before   Repeat PHQ in [place] on file       *Essential hypertension   -Well-controlled on this visit, currently on metoprolol 150, lisinopril 5 mg.     *Obstructive sleep apnea.  -Patient is currently not using CPAP as he was not able to tolerate the machine.     *Low vitamin B6   No symptoms of malabsorption - alcohol use not discussed  Does report some falls, no neuropathy reported  Repeat - high - supplement stopped      B12 normal   B1 normal        *Dyslipidemia-patient is on 40 mg atorvastatin.  Social history, family history not discussed on this visit.pending reestablishing visit in 6 weeks   Orthostatic dizziness         Patient Active Problem List    Diagnosis Date Noted    Hypothyroid 02/19/2024    Post-nasal drip 02/19/2024    Other microscopic hematuria 01/04/2024    Depression 10/26/2023    Cognitive change 10/26/2023    Orthostatic dizziness 10/26/2023    Primary osteoarthritis of both knees 10/26/2023    Primary osteoarthritis of both hips 10/26/2023    Sacroiliitis (HCC) 10/26/2023    Lumbar back pain 10/26/2023    Lumbar degenerative disc disease 10/26/2023    Dysequilibrium 10/26/2023    Snoring 01/13/2023    Pure hypercholesterolemia 08/23/2022    Reactive depression 08/23/2022    Ascending aortic aneurysm (HCC) 12/10/2021    Essential hypertension 08/12/2021    Unspecified atrial fibrillation (HCC) 07/07/2021       Current Outpatient Medications   Medication Sig Dispense Refill    finasteride (PROSCAR) 5 MG Tab Take 1 Tablet by mouth every day.      mirtazapine (REMERON) 15 MG Tab Take 1 Tablet by mouth every evening.      phenazopyridine (PYRIDIUM) 200 MG Tab Take 1 Tablet by mouth 3 times a  day.      fexofenadine (ALLEGRA) 60 MG Tab Take 1 Tablet by mouth every 12 hours as needed (phlegm). 60 Tablet 1    guaiFENesin ER (MUCINEX) 600 MG TABLET SR 12 HR Take 1 Tablet by mouth every 12 hours as needed for Cough. 60 Tablet 1    levothyroxine (SYNTHROID) 25 MCG Tab Take 0.5 Tablets by mouth every morning on an empty stomach. 45 Tablet 3    fluticasone (FLONASE) 50 MCG/ACT nasal spray Administer 1 Spray into affected nostril(S) every day. 16 g 1    citalopram (CELEXA) 10 MG tablet Take 1 Tablet by mouth every day. 90 Tablet 1    atorvastatin (LIPITOR) 40 MG Tab Take 1 Tablet by mouth every day. Take 1 tablet by mouth every night at bedtime 90 Tablet 1    lisinopril (PRINIVIL) 5 MG Tab Take 1 Tablet by mouth every morning. 90 Tablet 1    metoprolol SR (TOPROL XL) 50 MG TABLET SR 24 HR Take 50 mg by mouth every day.      aspirin EC (ECOTRIN) 81 MG Tablet Delayed Response Take 1 Tablet by mouth every day. 90 Tablet 1    metoprolol SR (TOPROL XL) 100 MG TABLET SR 24 HR at bedtime.       No current facility-administered medications for this visit.          Current supplements as per medication list.     Allergies: Patient has no known allergies.    Current social contact/activities:      He  reports that he has quit smoking. His smoking use included cigarettes. He has a 5 pack-year smoking history. He has never used smokeless tobacco. He reports that he does not currently use alcohol. He reports that he does not use drugs.  Counseling given: Not Answered  Tobacco comments: Quit around age 41.      ROS:    Gait: Uses no assistive device  Ostomy: No  Other tubes: No  Amputations: No  Chronic oxygen use: No  Last eye exam: a couple weeks ago by   Wears hearing aids: Yes   : Denies any urinary leakage during the last 6 months    Screening:    Depression Screening  Little interest or pleasure in doing things?  1 - several days  Feeling down, depressed , or hopeless? 1 - several days  Trouble falling or  staying asleep, or sleeping too much?  0 - not at all  Feeling tired or having little energy?  1 - several days  Poor appetite or overeating?  0 - not at all  Feeling bad about yourself - or that you are a failure or have let yourself or your family down? 0 - not at all  Trouble concentrating on things, such as reading the newspaper or watching television? 0 - not at all  Moving or speaking so slowly that other people could have noticed.  Or the opposite - being so fidgety or restless that you have been moving around a lot more than usual?  0 - not at all  Thoughts that you would be better off dead, or of hurting yourself?  0 - not at all  Patient Health Questionnaire Score:      If depressive symptoms identified deferred to follow up visit unless specifically addressed in assessment and plan.    Interpretation of PHQ-9 Total Score   Score Severity   1-4 No Depression   5-9 Mild Depression   10-14 Moderate Depression   15-19 Moderately Severe Depression   20-27 Severe Depression    Screening for Cognitive Impairment  Do you or any of your friends or family members have any concern about your memory? No  Three Minute Recall (Banana, Sunrise, Chair) 2/3    Rashid clock face with all 12 numbers and set the hands to show 20 past 8.  No    Cognitive concerns identified deferred for follow up unless specifically addressed in assessment and plan.    Fall Risk Assessment  Has the patient had two or more falls in the last year or any fall with injury in the last year?  Yes    Safety Assessment  Do you always wear your seatbelt?  Yes  Any changes to home needed to function safely? No  Difficulty hearing.  Yes  Patient counseled about all safety risks that were identified.    Functional Assessment ADLs  Are there any barriers preventing you from cooking for yourself or meeting nutritional needs?  Yes.    Are there any barriers preventing you from driving safely or obtaining transportation?  No.    Are there any barriers  preventing you from using a telephone or calling for help?  No    Are there any barriers preventing you from shopping?  No.    Are there any barriers preventing you from taking care of your own finances?  No    Are there any barriers preventing you from managing your medications?  No    Are there any barriers preventing you from showering, bathing or dressing yourself? No    Are there any barriers preventing you from doing housework or laundry? No    Are there any barriers preventing you from using the toilet?No    Are you currently engaging in any exercise or physical activity?  Yes.      Self-Assessment of Health  What is your perception of your health? Excellent    Do you sleep more than six hours a night? Yes    In the past 7 days, how much did pain keep you from doing your normal work? None    Do you spend quality time with family or friends (virtually or in person)? Yes    Do you usually eat a heart healthy diet that constists of a variety of fruits, vegetables, whole grains and fiber? Yes    Do you eat foods high in fat and/or Fast Food more than three times per week? No    How concerned are you that your medical conditions are not being well managed? Not at all    Are you worried that in the next 2 months, you may not have stable housing that you own, rent, or stay in as part of a household? No        Advance Care Planning  Do you have an Advance Directive, Living Will, Durable Power of , or POLST? Yes    Living Will     is not on file - instructed patient to bring in a copy to scan into their chart      Health Maintenance Summary            Overdue - IMM DTaP/Tdap/Td Vaccine (1 - Tdap) Never done      No completion history exists for this topic.              Overdue - Zoster (Shingles) Vaccines (1 of 2) Never done      No completion history exists for this topic.              Overdue - Pneumococcal Vaccine: 65+ Years (2 of 2 - PPSV23 or PCV20) Overdue since 9/13/2019 09/13/2018  Imm Admin:  Pneumococcal Conjugate Vaccine (Prevnar/PCV-13)              Overdue - Influenza Vaccine (1) Overdue since 9/1/2023 12/01/2021  Imm Admin: Influenza Vaccine Quad Inj (Preserved)    11/18/2021  Imm Admin: Influenza Vaccine Adult HD              Overdue - COVID-19 Vaccine (3 - 2023-24 season) Overdue since 9/1/2023 08/29/2021  Imm Admin: PFIZER PURPLE CAP SARS-COV-2 VACCINATION (12+)    08/08/2021  Imm Admin: PFIZER PURPLE CAP SARS-COV-2 VACCINATION (12+)              Annual Wellness Visit (Yearly) Next due on 3/1/2025      03/01/2024  Done    02/29/2024  Level of Service: ANNUAL WELLNESS VISIT-INCLUDES PPPS SUBSEQUE*              Hepatitis A Vaccine (Hep A) (Series Information) Aged Out      No completion history exists for this topic.              Hepatitis B Vaccine (Hep B) (Series Information) Aged Out      No completion history exists for this topic.              HPV Vaccines (Series Information) Aged Out      No completion history exists for this topic.              Polio Vaccine (Inactivated Polio) (Series Information) Aged Out      No completion history exists for this topic.              Meningococcal Immunization (Series Information) Aged Out      No completion history exists for this topic.                    Patient Care Team:  Jj Molina M.D. as PCP - General (Internal Medicine)  Sharif Foster, PT, DPT, OCS as Physical Therapist (Physical Therapy)      Social History     Tobacco Use    Smoking status: Former     Current packs/day: 0.25     Average packs/day: 0.3 packs/day for 20.0 years (5.0 ttl pk-yrs)     Types: Cigarettes    Smokeless tobacco: Never    Tobacco comments:     Quit around age 41.   Vaping Use    Vaping Use: Never used   Substance Use Topics    Alcohol use: Not Currently     Comment: Very rarely, on special occasions    Drug use: Never     History reviewed. No pertinent family history.  He  has a past medical history of Arrhythmia, Arthritis (09/07/2021), Cataract, COPD  "(chronic obstructive pulmonary disease) (HCC), Dental disorder, High cholesterol (09/07/2021), History of UTI, Hypertension, Psychiatric problem (09/07/2021), and Typhus.   Past Surgical History:   Procedure Laterality Date    OTHER  2004    \"Prostate cleaned out\"    APPENDECTOMY      Around age 35-40.    CHOLECYSTECTOMY      Around age 35-40.       Exam:   /79   Pulse 74   Temp 36.8 °C (98.3 °F) (Temporal)   Resp 16   Ht 1.702 m (5' 7\")   Wt 102 kg (224 lb)   SpO2 94%  Body mass index is 35.08 kg/m².    Hearing fair.    Dentition poor  Alert, oriented in no acute distress.  Eye contact is good, speech goal directed, affect calm    Assessment and Plan. The following treatment and monitoring plan is recommended:      1. Chronic cough  -Also with nasal congestion with benefit with Flonase, continues to have phlegm at the back of the throat.  -Will prescribe Mucinex along with fexofenadine and patient did not benefit from loratadine.  -Patient does have a history of atrial fibrillation -this diagnosis was considered due to rare possibility of coughing presentation for A-fib.  However this cough is not associated with any palpitations, chest pressure hence less likely secondary to A-fib runs with RVR.  However close monitoring, low threshold for Holter if the cough continues  - guaiFENesin ER (MUCINEX) 600 MG TABLET SR 12 HR; Take 1 Tablet by mouth every 12 hours as needed for Cough.  Dispense: 60 Tablet; Refill: 1    2. Hypothyroidism, unspecified type  -Repeat TSH free T4 in 6 weeks.  - TSH WITH REFLEX TO FT4; Future    3. Post-nasal drip  -Continue Flonase as needed, he has responded significantly to that.  - fexofenadine (ALLEGRA) 60 MG Tab; Take 1 Tablet by mouth every 12 hours as needed (phlegm).  Dispense: 60 Tablet; Refill: 1    4. Cognitive change  -Was unable to farrah the time on the clock however was able to draw the clock very well today.  -Was able to recall 2 out of 3 words.  -Will plan to " assess detail MoCA on the next visit.  -However subjectively patient's daughter expresses significant improvement in mood, energy level for patient    5. Atrial fibrillation, unspecified type (HCC)  -Currently in sinus rhythm, given chronic cough low threshold to check EKG/Holter monitor to rule out A-fib with RVR runs causing cough    6. Reactive depression  -Significant improvement with physical therapy, continue escitalopram 10 mg      Preventative health.  -Annual wellness visit today.  -Vaccinations not discussed given lack of time.  Pending pneumococcal shot, zoster and Tdap  -Colonoscopy aged out.  -Former smoker as per chart, AAA screening to be discussed on the next visit.  -No family history of prostate cancer recorded, is following up with urology as well.  Does have history of renal cell carcinoma.  -Is a fall risk however significantly reduced since initiating physical therapy.      Services suggested: No services needed at this time  Health Care Screening: Age-appropriate preventive services recommended by USPTF and ACIP covered by Medicare were discussed today. Services ordered if indicated and agreed upon by the patient.  Referrals offered: Community-based lifestyle interventions to reduce health risks and promote self-management and wellness, fall prevention, nutrition, physical activity, tobacco-use cessation, weight loss, and mental health services as per orders if indicated.    Discussion today about general wellness and lifestyle habits:    Prevent falls and reduce trip hazards; Cautioned about securing or removing rugs.  Have a working fire alarm and carbon monoxide detector;   Engage in regular physical activity and social activities     Follow-up: Return in about 6 weeks (around 4/11/2024).

## 2024-03-05 ENCOUNTER — APPOINTMENT (OUTPATIENT)
Dept: PHYSICAL THERAPY | Facility: REHABILITATION | Age: 89
End: 2024-03-05
Attending: INTERNAL MEDICINE
Payer: MEDICARE

## 2024-03-07 ENCOUNTER — APPOINTMENT (OUTPATIENT)
Dept: PHYSICAL THERAPY | Facility: REHABILITATION | Age: 89
End: 2024-03-07
Attending: INTERNAL MEDICINE
Payer: MEDICARE

## 2024-03-08 ENCOUNTER — PHYSICAL THERAPY (OUTPATIENT)
Dept: PHYSICAL THERAPY | Facility: REHABILITATION | Age: 89
End: 2024-03-08
Attending: INTERNAL MEDICINE
Payer: MEDICARE

## 2024-03-08 DIAGNOSIS — M51.36 LUMBAR DEGENERATIVE DISC DISEASE: ICD-10-CM

## 2024-03-08 PROCEDURE — 97110 THERAPEUTIC EXERCISES: CPT

## 2024-03-08 NOTE — OP THERAPY DAILY TREATMENT
Outpatient Physical Therapy  DAILY TREATMENT     [facility] [address]  [address]  [place] [address]  Phone:  [phone]  Fax:  [phone]    Date: 03/08/2024    Patient: Sammy Yanez  YOB: 1935  MRN: [id number]     Time Calculation    Start time: 1015  Stop time: 1045 Time Calculation (min): 30 minutes         Chief Complaint: No chief complaint on file.    Visit #: 6    SUBJECTIVE:  Doing well and feels good    OBJECTIVE:  DGI 50/56--low fall risk          Therapeutic Treatments and Modalities:     Therapeutic Treatment and Modalities Summary: Reviewed HEP    Time-based treatments/modalities:    Physical Therapy Timed Treatment Charges  Therapeutic exercise minutes (CPT 48066): 25 minutes      Pain rating (1-10) before treatment:    Pain rating (1-10) after treatment:      ASSESSMENT:   Doing well-- all goals met--patient is an independent community ambulator  and low fall risk    PLAN/RECOMMENDATIONS:   D/c to an independent [facility]

## 2024-03-09 NOTE — OP THERAPY DISCHARGE SUMMARY
Outpatient Physical Therapy  DISCHARGE SUMMARY NOTE      [address]  [address] [place] [address]  Phone:  [phone]  Fax:  [phone]    Date of Visit: 03/08/2024    Patient: Sammy Yanez  YOB: 1935  MRN: [id number]     Referring Provider: Jj Molina M.D.  [address] [place] [address]   Referring Diagnosis Bilateral primary osteoarthritis of knee [M17.0];Bilateral primary osteoarthritis of hip [M16.0];Sacroiliitis, not elsewhere classified [M46.1];Other intervertebral disc degeneration, lumbar region [M51.36]         Functional Assessment Used  DGI 50/56--low fall risk        Your patient is being discharged from Physical Therapy with the following comments:   Goals met  Visit #: 6     SUBJECTIVE:  Doing well and feels good          ASSESSMENT:   Doing well-- all goals met--patient is an independent community ambulator  and low fall risk.  Struggles but improving with tandem stance activity--good stepping strategies     PLAN/RECOMMENDATIONS:   D/c to an independent HEP       Sharif Foster, PT, DPT, OCS    Date: 3/8/2024

## 2024-03-12 ENCOUNTER — APPOINTMENT (OUTPATIENT)
Dept: PHYSICAL THERAPY | Facility: REHABILITATION | Age: 89
End: 2024-03-12
Attending: INTERNAL MEDICINE
Payer: MEDICARE

## 2024-03-30 DIAGNOSIS — R41.89 COGNITIVE CHANGE: ICD-10-CM

## 2024-04-01 RX ORDER — CITALOPRAM HYDROBROMIDE 10 MG/1
10 TABLET ORAL DAILY
Qty: 90 TABLET | Refills: 1 | Status: SHIPPED | OUTPATIENT
Start: 2024-04-01

## 2024-04-01 NOTE — TELEPHONE ENCOUNTER
Received request via: Pharmacy    Was the patient seen in the last year in this department? Yes    Does the patient have an active prescription (recently filled or refills available) for medication(s) requested? No    Pharmacy Name: [id number] - [place], [place] - [address]     Does the patient have long term Plus and need 100 day supply (blood pressure, diabetes and cholesterol meds only)? Patient does not have SCP

## 2024-04-16 ENCOUNTER — HOSPITAL ENCOUNTER (OUTPATIENT)
Dept: LAB | Facility: MEDICAL CENTER | Age: 89
End: 2024-04-16
Attending: NURSE PRACTITIONER
Payer: MEDICARE

## 2024-04-16 ENCOUNTER — HOSPITAL ENCOUNTER (OUTPATIENT)
Dept: LAB | Facility: MEDICAL CENTER | Age: 89
End: 2024-04-16
Attending: INTERNAL MEDICINE
Payer: MEDICARE

## 2024-04-16 DIAGNOSIS — E03.9 HYPOTHYROIDISM, UNSPECIFIED TYPE: ICD-10-CM

## 2024-04-16 LAB
ALBUMIN SERPL BCP-MCNC: 4.1 G/DL (ref 3.2–4.9)
ALBUMIN/GLOB SERPL: 1.2 G/DL
ALP SERPL-CCNC: 52 U/L (ref 30–99)
ALT SERPL-CCNC: 15 U/L (ref 2–50)
ANION GAP SERPL CALC-SCNC: 11 MMOL/L (ref 7–16)
AST SERPL-CCNC: 16 U/L (ref 12–45)
BASOPHILS # BLD AUTO: 0.3 % (ref 0–1.8)
BASOPHILS # BLD: 0.02 K/UL (ref 0–0.12)
BILIRUB SERPL-MCNC: 0.4 MG/DL (ref 0.1–1.5)
BUN SERPL-MCNC: 25 MG/DL (ref 8–22)
CALCIUM ALBUM COR SERPL-MCNC: 9.3 MG/DL (ref 8.5–10.5)
CALCIUM SERPL-MCNC: 9.4 MG/DL (ref 8.5–10.5)
CHLORIDE SERPL-SCNC: 105 MMOL/L (ref 96–112)
CHOLEST SERPL-MCNC: 140 MG/DL (ref 100–199)
CO2 SERPL-SCNC: 23 MMOL/L (ref 20–33)
CREAT SERPL-MCNC: 1 MG/DL (ref 0.5–1.4)
EOSINOPHIL # BLD AUTO: 0.2 K/UL (ref 0–0.51)
EOSINOPHIL NFR BLD: 2.8 % (ref 0–6.9)
ERYTHROCYTE [DISTWIDTH] IN BLOOD BY AUTOMATED COUNT: 51.8 FL (ref 35.9–50)
GFR SERPLBLD CREATININE-BSD FMLA CKD-EPI: 73 ML/MIN/1.73 M 2
GLOBULIN SER CALC-MCNC: 3.3 G/DL (ref 1.9–3.5)
GLUCOSE SERPL-MCNC: 98 MG/DL (ref 65–99)
HCT VFR BLD AUTO: 42.2 % (ref 42–52)
HDLC SERPL-MCNC: 59 MG/DL
HGB BLD-MCNC: 13.8 G/DL (ref 14–18)
IMM GRANULOCYTES # BLD AUTO: 0.03 K/UL (ref 0–0.11)
IMM GRANULOCYTES NFR BLD AUTO: 0.4 % (ref 0–0.9)
LDLC SERPL CALC-MCNC: 61 MG/DL
LYMPHOCYTES # BLD AUTO: 1.33 K/UL (ref 1–4.8)
LYMPHOCYTES NFR BLD: 18.8 % (ref 22–41)
MCH RBC QN AUTO: 31.6 PG (ref 27–33)
MCHC RBC AUTO-ENTMCNC: 32.7 G/DL (ref 32.3–36.5)
MCV RBC AUTO: 96.6 FL (ref 81.4–97.8)
MONOCYTES # BLD AUTO: 0.57 K/UL (ref 0–0.85)
MONOCYTES NFR BLD AUTO: 8 % (ref 0–13.4)
NEUTROPHILS # BLD AUTO: 4.94 K/UL (ref 1.82–7.42)
NEUTROPHILS NFR BLD: 69.7 % (ref 44–72)
NRBC # BLD AUTO: 0 K/UL
NRBC BLD-RTO: 0 /100 WBC (ref 0–0.2)
PLATELET # BLD AUTO: 237 K/UL (ref 164–446)
PMV BLD AUTO: 8.7 FL (ref 9–12.9)
POTASSIUM SERPL-SCNC: 4.8 MMOL/L (ref 3.6–5.5)
PROT SERPL-MCNC: 7.4 G/DL (ref 6–8.2)
RBC # BLD AUTO: 4.37 M/UL (ref 4.7–6.1)
SODIUM SERPL-SCNC: 139 MMOL/L (ref 135–145)
TRIGL SERPL-MCNC: 98 MG/DL (ref 0–149)
TSH SERPL DL<=0.005 MIU/L-ACNC: 3.63 UIU/ML (ref 0.38–5.33)
WBC # BLD AUTO: 7.1 K/UL (ref 4.8–10.8)

## 2024-04-16 PROCEDURE — 80061 LIPID PANEL: CPT

## 2024-04-16 PROCEDURE — 80053 COMPREHEN METABOLIC PANEL: CPT

## 2024-04-16 PROCEDURE — 84443 ASSAY THYROID STIM HORMONE: CPT

## 2024-04-16 PROCEDURE — 36415 COLL VENOUS BLD VENIPUNCTURE: CPT

## 2024-04-16 PROCEDURE — 85025 COMPLETE CBC W/AUTO DIFF WBC: CPT

## 2024-04-17 NOTE — PROGRESS NOTES
Chief Complaint   Patient presents with    Hypertension    Lab Results    Dizziness     Patient here for lab review       HPI: Sammy Yanez is a 88 y.o. male with past medical history as below  who presented to the clinic for the following.    *Hypothyroidism   On 12.5mcg of levothyroxine   Doing well overall   TSH > 5.580 > 4.11 after starting 12.5mcg > 3.630 at 6 week repeat without dose change   Patients daughter reports that cardiology office suggested increasing dose.      *Paroxysmal atrial fibrillation.  -Patient is currently on metoprolol, not on any anticoagulation, following up with cardiology for the same.  -Patient does have this chronic cough however not associated with any palpitations.  -On examination patient is in sinus rhythm, lung examination unremarkable     *Cough   -Associated with phlegm in the back of his throat, nasal congestion  -claritin ineffective, allegra with flonase helping  - effects last for more than a day- no shortness of breath, no dysphagia    Denies any orthopnea, PND.   denies any wheezing.  Patient has been on spiriva and albuterol in the past  No pft on file    -On examination there is no exudate appreciated, lungs are symmetrical breath sounds, no added sounds.    *Fatigue  -Initially described as dizziness by daughter, later clarified is just feeling tired wanting to sleep more  -Recently patient has been feeling easily fatigued, more sleepy.  Confirmed that there was no confusion, weakness speech abnormality.  During interview today patient points towards his eyes and reports that they look tired.  -Additionally her daughter reports that she has been trying to have him move around more they have been going on walks which tires him out.  -Recent changes include cystoscopy procedure.  -Patient is currently on Allegra which he has been on even prior to onset of this fatigue.  -Neuroexam unremarkable        *Hematuria   -Resolved  -History of RCC status post  ablation   Last UA showing WBC and RBCs   -Renal function shows improvement - BUN still slightly elevated, patient is staying well hydrated   Patient also had a CT scan through urology that did not show any new mass.   -Patient has since had cystoscopy that reviewed enlarged prostate as per patient's daughter recommended TURP however daughter is reluctant to have patient under anesthesia for the procedure, besides hematuria has resolved.  Patient was also started on finasteride   -Discussed possibility of putting off procedure if not bothersome, and also having a discussion with urology to see if that is appropriate                Other problems not discussed on this visit include     *Fall history.- none recently  -Patient reported that he has very bad bilateral knees, and hip pain along with low back pain.  -On examination patient did have tenderness to palpation in the lumbar midline, patient did have x-ray done after the fall which did not show any fractures but did show degenerative changes including sacroiliitis.  -On hip examination patient did  have painful internal and external range of motion of the left hip in the anterior area, there was pain reported on the left hip however examination is normal today.  -Patient did have symmetrical strength on bilateral lower extremities, sensation not impacted.  -Straight leg test was negative.  -physical therapy referral was placed -she has not pursued physical therapy with significant benefit, no falls since, patient is able to get down from the examination table without much difficulty compared to before, required slight assistance to climb up on the examination table.  depression, cognitive decline  Patient is [place] only speaking, is accompanied by her daughter today.  -more depressed than normal since spouse;s death in September after 62 years of marriage   Patient had been on mirtazapine even prior to spouse's death.  -Patient has been found talking to the  urn, crying a lot.  Sleeping a lot.  Rarely goes outside anymore.  -Normally he expresses his frustration regarding general matters to his wife however now he is currently living with his son and this has been difficult for him as well.  - sometimes sees things like shadows and hears basals and other sounds which are not present.  He is also is reported to have seen a light coming from the urn, and sometimes wishes that he could join his wife.   -No intent for suicide, daughter confirms that that is not in favor of his believes at all.   -Of note patient was briefly discontinued off mirtazapine due to the possibility of it being a reactive depression secondary to wife's passing however he has had to go back on it as per daughter.  When it was first started patient was also having weight loss and decreased appetite, however his weight has been trending up based on most recent trends, BMI of 34.93 at this time.  -Started on citalopram on last visit and daughter reports that she has noticed improvement and that he is not sleeping a lot like before   Repeat PHQ in Estonian on file     *Essential hypertension   -Well-controlled on this visit, currently on metoprolol 150, lisinopril 5 mg.   *Obstructive sleep apnea.  -Patient is currently not using CPAP as he was not able to tolerate the machine.   *Low vitamin B6   No symptoms of malabsorption - alcohol use not discussed  Does report some falls, no neuropathy reported  Repeat - high - supplement stopped    B12 normal   B1 normal    *Dyslipidemia-patient is on 40 mg atorvastatin.  Social history, family history not discussed on this visit.pending reestablishing visit in 6 weeks   Orthostatic dizziness   Preventative health.  -Annual wellness visit today.  -Vaccinations not discussed given lack of time.  Pending pneumococcal shot, zoster and Tdap  -Colonoscopy aged out.  -Former smoker as per chart, AAA screening to be discussed on the next visit.  -No family history of  prostate cancer recorded, is following up with urology as well.  Does have history of renal cell carcinoma.  -Is a fall risk however significantly reduced since initiating physical therapy.             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 30 Capsule 3    guaiFENesin ER (MUCINEX) 600 MG TABLET SR 12 HR Take 1 Tablet by mouth every 12 hours as needed for Cough. 60 Tablet 1    fexofenadine (ALLEGRA) 60 MG Tab Take 1 Tablet by mouth every 12 hours as needed (phlegm). 60 Tablet 1    citalopram (CELEXA) 10 MG tablet TAKE 1 TABLET BY MOUTH EVERY DAY 90 Tablet 1    finasteride (PROSCAR) 5 MG Tab Take 1 Tablet by mouth every day.      levothyroxine (SYNTHROID) 25 MCG Tab Take 0.5 Tablets by mouth every morning on an empty stomach. 45 Tablet 3    fluticasone (FLONASE) 50 MCG/ACT nasal spray Administer 1 Spray into affected nostril(S) every day. 16 g 1    atorvastatin (LIPITOR) 40 MG Tab Take 1 Tablet by mouth every day. Take 1 tablet by mouth every night at bedtime 90 Tablet 1    lisinopril (PRINIVIL) 5 MG Tab Take 1 Tablet by mouth every morning.  "90 Tablet 1    metoprolol SR (TOPROL XL) 50 MG TABLET SR 24 HR Take 50 mg by mouth every day.      aspirin EC (ECOTRIN) 81 MG Tablet Delayed Response Take 1 Tablet by mouth every day. 90 Tablet 1    metoprolol SR (TOPROL XL) 100 MG TABLET SR 24 HR at bedtime.      phenazopyridine (PYRIDIUM) 200 MG Tab Take 1 Tablet by mouth 3 times a day. (Patient not taking: Reported on 4/18/2024)       No current facility-administered medications for this visit.       ROS: As per HPI. Additional pertinent systems as noted below.  Constitutional:  Negative for fever, chills   HEENT : excessive phlegm production   Cardiovascular:  Negative for chest pain, palpitations   Respiratory:  not short of breath      : as in hpi     /71 (BP Location: Right arm, Patient Position: Sitting, BP Cuff Size: Large adult)   Pulse 68   Temp 36.1 °C (97 °F) (Temporal)   Ht 1.702 m (5' 7\")   Wt 102 kg (224 lb 12.8 oz)   SpO2 94%   BMI 35.21 kg/m²     Physical Exam   Constitutional:   Not in acute distress   CVS /RESPI: normal S1 and s2 no added sounded, no murmurs, symmetrical breath sounds.   Musculoskeletal: not examined today - last visit exam in hpi  Neuro : Cranial nerves intact, olfactory not examined post taste not tested full.  Strength 5 out of 5 in upper and lower extremity.  Gait normal.  Finger to nose touch intact    Note: I have reviewed all pertinent labs and diagnostic tests associated with this visit with specific comments listed under the assessment and plan below    Assessment and Plan    1. Chronic bronchitis, unspecified chronic bronchitis type (HCC)  -Responding to Allegra and Flonase however patient is having symptoms of fatigue would like to trial off of her Allegra which may be making it worse as a substitute recommended to try Mucinex given that primary problem is excessive phlegm production.  -Patient does not have any heartburn symptoms.  -Patient has been on albuterol and Spiriva in the past which helped, he " stopped using it because symptoms got better.  -Will prescribe Spiriva in case Mucinex is not working.  Will try to avoid albuterol given history of A-fib on possibility of precipitating A-fib with RVR  - tiotropium (SPIRIVA HANDIHALER) 18 MCG Cap; Place 1 Capsule into inhaler and inhale every day.  Dispense: 30 Capsule; Refill: 3    2. Chronic cough  Secondary to postnasal drip, excessive phlegm production plan as under problem 1  - guaiFENesin ER (MUCINEX) 600 MG TABLET SR 12 HR; Take 1 Tablet by mouth every 12 hours as needed for Cough.  Dispense: 60 Tablet; Refill: 1    3. Anemia, unspecified type  -Check CBC  Most likely related to cystoscopy procedure, no other signs of bleeding however does have some fatigue hence we will need to monitor  - CBC WITHOUT DIFFERENTIAL; Future    4. Other fatigue  -Recently patient has been feeling easily fatigued, more sleepy.  Confirmed that there was no confusion, weakness speech abnormality.  During interview today patient points towards his eyes and reports that they look tired.  -Additionally her daughter reports that she has been trying to have him move around more they have been going on walks which tires him out.  -Recent changes include cystoscopy procedure.  -Patient is currently on Allegra which he has been on even prior to onset of this fatigue.  -Neuroexam unremarkable    Trial off Allegra, substituting Mucinex or Spiriva to help control symptoms of cough/phlegm production.  -Has mild anemia after procedure, recheck in a month.  Did not address CPAP compliance on this visit, will be addressed on the next visit.  -Mood disorder under control.  -Since patient is more active now this may also be contributing, however recommend continued activity as tolerated to prevent deconditioning    5. Hypothyroidism, unspecified type  -No dose adjustment today, TSH stable, repeat in 3-month  -Would like for TSH to be on the higher end of normal to prevent reemergence of A-fib with  RVR    6. Gross hematuria  -History of RCC status post ablation   Last UA showing WBC and RBCs   -Renal function shows improvement - BUN still slightly elevated, patient is staying well hydrated   Patient also had a CT scan through urology that did not show any new mass.   -Patient has since had cystoscopy that reviewed enlarged prostate as per patient's daughter recommended TURP however daughter is reluctant to have patient under anesthesia for the procedure, besides hematuria has resolved.  Patient was also started on finasteride   -Discussed possibility of putting off procedure if not bothersome, and also having a discussion with urology to see if that is appropriate       Followup: Return in about 3 months (around 7/18/2024).        Signed by: Jj Molina M.D.

## 2024-04-17 NOTE — PROGRESS NOTES
No chief complaint on file.      HPI:  Sammy Yanez is a 88 y.o. here for Medicare Annual Wellness Visit        *Hypothyroidism   On 12.5mcg of levothyroxine   Doing well overall   Last TSH wnl -repeat will be done in 6 weeks     Did  have lower extremity muscle pain, faitgue depression, cognitive decline . -Significantly better as per daughter with physical therapy for    *Paroxysmal atrial fibrillation.  -Patient is currently on metoprolol, not on any anticoagulation, following up with cardiology for the same.  -Patient does have this chronic cough however not associated with any palpitations.  -On examination patient is in sinus rhythm, lung examination unremarkable     *Cough   -Associated with phlegm in the back of his throat, nasal congestion  -claritin ineffective   Floanse has helped a lot - patient doing well   -Patient is however still having cough while he is talking in long sentences, and also otherwise during the day as well as during the night without much significant change at night.  Denies any orthopnea, PND.  -Denies any shortness of breath otherwise, denies any wheezing.  -Patient has brought with her daughter over-the-counter NyQuil DayQuil.  -On examination there is no exudate appreciated, lungs are symmetrical breath sounds, no added sounds.        *Hematuria   -Resolved  -History of RCC status post ablation   Last UA showing WBC and RBCs   -Renal function shows improvement - BUN still slightly elevated, patient is staying well hydrated   Patient also had a CT scan through urology that did not show any new mass.   -Patient has received appointment with new neurologist however since resolution of hematuria at the decided to wait for the cystoscopy is scheduled for April  On examination today - no flank pain, no suprapubic tenderness.      *Fall history.- none recently  -Patient reported that he has very bad bilateral knees, and hip pain along with low back pain.  -On examination patient did  have tenderness to palpation in the lumbar midline, patient did have x-ray done after the fall which did not show any fractures but did show degenerative changes including sacroiliitis.  -On hip examination patient did  have painful internal and external range of motion of the left hip in the anterior area, there was pain reported on the left hip however examination is normal today.  -Patient did have symmetrical strength on bilateral lower extremities, sensation not impacted.  -Straight leg test was negative.  -physical therapy referral was placed -she has not pursued physical therapy with significant benefit, no falls since, patient is able to get down from the examination table without much difficulty compared to before, required slight assistance to climb up on the examination table.         Other problems not discussed on this visit include      depression, cognitive decline  Patient is [place] only speaking, is accompanied by her daughter today.  -more depressed than normal since spouse;s death in September after 62 years of marriage   Patient had been on mirtazapine even prior to spouse's death.  -Patient has been found talking to the urn, crying a lot.  Sleeping a lot.  Rarely goes outside anymore.  -Normally he expresses his frustration regarding general matters to his wife however now he is currently living with his son and this has been difficult for him as well.  - sometimes sees things like shadows and hears basals and other sounds which are not present.  He is also is reported to have seen a light coming from the urn, and sometimes wishes that he could join his wife.   -No intent for suicide, daughter confirms that that is not in favor of his believes at all.   -Of note patient was briefly discontinued off mirtazapine due to the possibility of it being a reactive depression secondary to wife's passing however he has had to go back on it as per daughter.  When it was first started patient was also  having weight loss and decreased appetite, however his weight has been trending up based on most recent trends, BMI of 34.93 at this time.  -Started on citalopram on last visit and daughter reports that she has noticed improvement and that he is not sleeping a lot like before   Repeat PHQ in Syriac on file       *Essential hypertension   -Well-controlled on this visit, currently on metoprolol 150, lisinopril 5 mg.     *Obstructive sleep apnea.  -Patient is currently not using CPAP as he was not able to tolerate the machine.     *Low vitamin B6   No symptoms of malabsorption - alcohol use not discussed  Does report some falls, no neuropathy reported  Repeat - high - supplement stopped      B12 normal   B1 normal        *Dyslipidemia-patient is on 40 mg atorvastatin.  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 60 Tablet 1    citalopram (CELEXA) 10 MG tablet TAKE 1 TABLET BY MOUTH EVERY DAY 90 Tablet 1    finasteride (PROSCAR) 5 MG Tab Take 1 Tablet by mouth every day.       phenazopyridine (PYRIDIUM) 200 MG Tab Take 1 Tablet by mouth 3 times a day.      guaiFENesin ER (MUCINEX) 600 MG TABLET SR 12 HR Take 1 Tablet by mouth every 12 hours as needed for Cough. 60 Tablet 1    levothyroxine (SYNTHROID) 25 MCG Tab Take 0.5 Tablets by mouth every morning on an empty stomach. 45 Tablet 3    fluticasone (FLONASE) 50 MCG/ACT nasal spray Administer 1 Spray into affected nostril(S) every day. 16 g 1    atorvastatin (LIPITOR) 40 MG Tab Take 1 Tablet by mouth every day. Take 1 tablet by mouth every night at bedtime 90 Tablet 1    lisinopril (PRINIVIL) 5 MG Tab Take 1 Tablet by mouth every morning. 90 Tablet 1    metoprolol SR (TOPROL XL) 50 MG TABLET SR 24 HR Take 50 mg by mouth every day.      aspirin EC (ECOTRIN) 81 MG Tablet Delayed Response Take 1 Tablet by mouth every day. 90 Tablet 1    metoprolol SR (TOPROL XL) 100 MG TABLET SR 24 HR at bedtime.       No current facility-administered medications for this visit.          Current supplements as per medication list.     Allergies: Patient has no known allergies.    Current social contact/activities:      He  reports that he has quit smoking. His smoking use included cigarettes. He has a 5 pack-year smoking history. He has never used smokeless tobacco. He reports that he does not currently use alcohol. He reports that he does not use drugs.  Counseling given: Not Answered  Tobacco comments: Quit around age 41.      ROS:    Gait: Uses no assistive device  Ostomy: No  Other tubes: No  Amputations: No  Chronic oxygen use: No  Last eye exam: a couple weeks ago by   Wears hearing aids: Yes   : Denies any urinary leakage during the last 6 months    Screening:    Depression Screening  Little interest or pleasure in doing things?     Feeling down, depressed , or hopeless?    Trouble falling or staying asleep, or sleeping too much?     Feeling tired or having little energy?     Poor appetite or overeating?     Feeling bad about yourself -  or that you are a failure or have let yourself or your family down?    Trouble concentrating on things, such as reading the newspaper or watching television?    Moving or speaking so slowly that other people could have noticed.  Or the opposite - being so fidgety or restless that you have been moving around a lot more than usual?     Thoughts that you would be better off dead, or of hurting yourself?     Patient Health Questionnaire Score:      If depressive symptoms identified deferred to follow up visit unless specifically addressed in assessment and plan.    Interpretation of PHQ-9 Total Score   Score Severity   1-4 No Depression   5-9 Mild Depression   10-14 Moderate Depression   15-19 Moderately Severe Depression   20-27 Severe Depression    Screening for Cognitive Impairment  Do you or any of your friends or family members have any concern about your memory?    Three Minute Recall (Banana, Sunrise, Chair)  /3    Rashid clock face with all 12 numbers and set the hands to show 20 past 8.       Cognitive concerns identified deferred for follow up unless specifically addressed in assessment and plan.    Fall Risk Assessment  Has the patient had two or more falls in the last year or any fall with injury in the last year?       Safety Assessment  Do you always wear your seatbelt?     Any changes to home needed to function safely?    Difficulty hearing.     Patient counseled about all safety risks that were identified.    Functional Assessment ADLs  Are there any barriers preventing you from cooking for yourself or meeting nutritional needs?   .    Are there any barriers preventing you from driving safely or obtaining transportation?   .    Are there any barriers preventing you from using a telephone or calling for help?       Are there any barriers preventing you from shopping?   .    Are there any barriers preventing you from taking care of your own finances?       Are there any barriers preventing you from managing  your medications?       Are there any barriers preventing you from showering, bathing or dressing yourself?      Are there any barriers preventing you from doing housework or laundry?      Are there any barriers preventing you from using the toilet?     Are you currently engaging in any exercise or physical activity?   .      Self-Assessment of Health  What is your perception of your health?      Do you sleep more than six hours a night?      In the past 7 days, how much did pain keep you from doing your normal work?      Do you spend quality time with family or friends (virtually or in person)?      Do you usually eat a heart healthy diet that constists of a variety of fruits, vegetables, whole grains and fiber?      Do you eat foods high in fat and/or Fast Food more than three times per week?      How concerned are you that your medical conditions are not being well managed?      Are you worried that in the next 2 months, you may not have stable housing that you own, rent, or stay in as part of a household?          Advance Care Planning  Do you have an Advance Directive, Living Will, Durable Power of , or POLST?                   Health Maintenance Summary            Overdue - IMM DTaP/Tdap/Td Vaccine (1 - Tdap) Never done      No completion history exists for this topic.              Overdue - Zoster (Shingles) Vaccines (1 of 2) Never done      No completion history exists for this topic.              Overdue - Pneumococcal Vaccine: 65+ Years (2 of 2 - PPSV23 or PCV20) Overdue since 9/13/2019 09/13/2018  Imm Admin: Pneumococcal Conjugate Vaccine (Prevnar/PCV-13)              Overdue - COVID-19 Vaccine (3 - 2023-24 season) Overdue since 9/1/2023 08/29/2021  Imm Admin: PFIZER PURPLE CAP SARS-COV-2 VACCINATION (12+)    08/08/2021  Imm Admin: PFIZER PURPLE CAP SARS-COV-2 VACCINATION (12+)              Influenza Vaccine (Season Ended) Next due on 9/1/2024 12/01/2021  Imm Admin: Influenza  "Vaccine Quad Inj (Preserved)    11/18/2021  Imm Admin: Influenza Vaccine Adult HD              Annual Wellness Visit (Yearly) Next due on 3/1/2025      03/01/2024  Done    02/29/2024  Level of Service: ANNUAL WELLNESS VISIT-INCLUDES PPPS SUBSEQUE*              Hepatitis A Vaccine (Hep A) (Series Information) Aged Out      No completion history exists for this topic.              Hepatitis B Vaccine (Hep B) (Series Information) Aged Out      No completion history exists for this topic.              HPV Vaccines (Series Information) Aged Out      No completion history exists for this topic.              Polio Vaccine (Inactivated Polio) (Series Information) Aged Out      No completion history exists for this topic.              Meningococcal Immunization (Series Information) Aged Out      No completion history exists for this topic.                    Patient Care Team:  Jj Molina M.D. as PCP - General (Internal Medicine)      Social History     Tobacco Use    Smoking status: Former     Current packs/day: 0.25     Average packs/day: 0.3 packs/day for 20.0 years (5.0 ttl pk-yrs)     Types: Cigarettes    Smokeless tobacco: Never    Tobacco comments:     Quit around age 41.   Vaping Use    Vaping Use: Never used   Substance Use Topics    Alcohol use: Not Currently     Comment: Very rarely, on special occasions    Drug use: Never     No family history on file.  He  has a past medical history of Arrhythmia, Arthritis (09/07/2021), Cataract, COPD (chronic obstructive pulmonary disease) (HCC), Dental disorder, High cholesterol (09/07/2021), History of UTI, Hypertension, Psychiatric problem (09/07/2021), and Typhus.   Past Surgical History:   Procedure Laterality Date    OTHER  2004    \"Prostate cleaned out\"    APPENDECTOMY      Around age 35-40.    CHOLECYSTECTOMY      Around age 35-40.       Exam:   There were no vitals taken for this visit. There is no height or weight on file to calculate BMI.    Hearing fair.  "   Dentition poor  Alert, oriented in no acute distress.  Eye contact is good, speech goal directed, affect calm    Assessment and Plan. The following treatment and monitoring plan is recommended:      1. Chronic cough  -Also with nasal congestion with benefit with Flonase, continues to have phlegm at the back of the throat.  -Will prescribe Mucinex along with fexofenadine and patient did not benefit from loratadine.  -Patient does have a history of atrial fibrillation -this diagnosis was considered due to rare possibility of coughing presentation for A-fib.  However this cough is not associated with any palpitations, chest pressure hence less likely secondary to A-fib runs with RVR.  However close monitoring, low threshold for Holter if the cough continues  - guaiFENesin ER (MUCINEX) 600 MG TABLET SR 12 HR; Take 1 Tablet by mouth every 12 hours as needed for Cough.  Dispense: 60 Tablet; Refill: 1    2. Hypothyroidism, unspecified type  -Repeat TSH free T4 in 6 weeks.  - TSH WITH REFLEX TO FT4; Future    3. Post-nasal drip  -Continue Flonase as needed, he has responded significantly to that.  - fexofenadine (ALLEGRA) 60 MG Tab; Take 1 Tablet by mouth every 12 hours as needed (phlegm).  Dispense: 60 Tablet; Refill: 1    4. Cognitive change  -Was unable to farrah the time on the clock however was able to draw the clock very well today.  -Was able to recall 2 out of 3 words.  -Will plan to assess detail MoCA on the next visit.  -However subjectively patient's daughter expresses significant improvement in mood, energy level for patient    5. Atrial fibrillation, unspecified type (HCC)  -Currently in sinus rhythm, given chronic cough low threshold to check EKG/Holter monitor to rule out A-fib with RVR runs causing cough    6. Reactive depression  -Significant improvement with physical therapy, continue escitalopram 10 mg      Preventative health.  -Annual wellness visit today.  -Vaccinations not discussed given lack of  time.  Pending pneumococcal shot, zoster and Tdap  -Colonoscopy aged out.  -Former smoker as per chart, AAA screening to be discussed on the next visit.  -No family history of prostate cancer recorded, is following up with urology as well.  Does have history of renal cell carcinoma.  -Is a fall risk however significantly reduced since initiating physical therapy.      Services suggested: No services needed at this time  Health Care Screening: Age-appropriate preventive services recommended by USPTF and ACIP covered by Medicare were discussed today. Services ordered if indicated and agreed upon by the patient.  Referrals offered: Community-based lifestyle interventions to reduce health risks and promote self-management and wellness, fall prevention, nutrition, physical activity, tobacco-use cessation, weight loss, and mental health services as per orders if indicated.    Discussion today about general wellness and lifestyle habits:    Prevent falls and reduce trip hazards; Cautioned about securing or removing rugs.  Have a working fire alarm and carbon monoxide detector;   Engage in regular physical activity and social activities     Follow-up: No follow-ups on file.

## 2024-04-18 ENCOUNTER — OFFICE VISIT (OUTPATIENT)
Dept: INTERNAL MEDICINE | Facility: OTHER | Age: 89
End: 2024-04-18
Payer: MEDICARE

## 2024-04-18 VITALS
TEMPERATURE: 97 F | DIASTOLIC BLOOD PRESSURE: 71 MMHG | WEIGHT: 224.8 LBS | OXYGEN SATURATION: 94 % | HEIGHT: 67 IN | BODY MASS INDEX: 35.28 KG/M2 | SYSTOLIC BLOOD PRESSURE: 115 MMHG | HEART RATE: 68 BPM

## 2024-04-18 DIAGNOSIS — E03.9 HYPOTHYROIDISM, UNSPECIFIED TYPE: ICD-10-CM

## 2024-04-18 DIAGNOSIS — J42 CHRONIC BRONCHITIS, UNSPECIFIED CHRONIC BRONCHITIS TYPE (HCC): ICD-10-CM

## 2024-04-18 DIAGNOSIS — R53.83 OTHER FATIGUE: ICD-10-CM

## 2024-04-18 DIAGNOSIS — D64.9 ANEMIA, UNSPECIFIED TYPE: ICD-10-CM

## 2024-04-18 DIAGNOSIS — R31.0 GROSS HEMATURIA: ICD-10-CM

## 2024-04-18 DIAGNOSIS — R05.3 CHRONIC COUGH: ICD-10-CM

## 2024-04-18 PROCEDURE — 99214 OFFICE O/P EST MOD 30 MIN: CPT | Performed by: INTERNAL MEDICINE

## 2024-04-18 PROCEDURE — 3074F SYST BP LT 130 MM HG: CPT | Performed by: INTERNAL MEDICINE

## 2024-04-18 PROCEDURE — 3078F DIAST BP <80 MM HG: CPT | Performed by: INTERNAL MEDICINE

## 2024-04-18 ASSESSMENT — PAIN SCALES - GENERAL: PAINLEVEL: NO PAIN

## 2024-04-18 ASSESSMENT — FIBROSIS 4 INDEX: FIB4 SCORE: 1.52

## 2024-04-19 RX ORDER — TIOTROPIUM BROMIDE 18 UG/1
18 CAPSULE ORAL; RESPIRATORY (INHALATION) DAILY
Qty: 30 CAPSULE | Refills: 3 | Status: SHIPPED | OUTPATIENT
Start: 2024-04-19

## 2024-04-19 RX ORDER — GUAIFENESIN 600 MG/1
600 TABLET, EXTENDED RELEASE ORAL EVERY 12 HOURS PRN
Qty: 60 TABLET | Refills: 1 | Status: SHIPPED | OUTPATIENT
Start: 2024-04-19

## 2024-04-23 ENCOUNTER — TELEPHONE (OUTPATIENT)
Dept: INTERNAL MEDICINE | Facility: OTHER | Age: 89
End: 2024-04-23
Payer: MEDICARE

## 2024-04-23 PROBLEM — R31.0 GROSS HEMATURIA: Status: ACTIVE | Noted: 2024-04-23

## 2024-04-23 PROBLEM — R53.83 FATIGUE: Status: ACTIVE | Noted: 2024-04-23

## 2024-04-23 NOTE — TELEPHONE ENCOUNTER
For Mojgan :   Please mail recent cbc lab ordered on 04/18 to patients home address     For    -  was not available during patient last check out. Hence pleasecall patient to arrange follow up appointment - I believe I said three month follow up to them - may have been 6 weeks please confirm with them.

## 2024-06-29 ENCOUNTER — HOSPITAL ENCOUNTER (OUTPATIENT)
Dept: LAB | Facility: MEDICAL CENTER | Age: 89
End: 2024-06-29
Attending: INTERNAL MEDICINE
Payer: MEDICARE

## 2024-06-29 DIAGNOSIS — D64.9 ANEMIA, UNSPECIFIED TYPE: ICD-10-CM

## 2024-06-29 LAB
ERYTHROCYTE [DISTWIDTH] IN BLOOD BY AUTOMATED COUNT: 54.7 FL (ref 35.9–50)
HCT VFR BLD AUTO: 43.3 % (ref 42–52)
HGB BLD-MCNC: 13.9 G/DL (ref 14–18)
MCH RBC QN AUTO: 30.7 PG (ref 27–33)
MCHC RBC AUTO-ENTMCNC: 32.1 G/DL (ref 32.3–36.5)
MCV RBC AUTO: 95.6 FL (ref 81.4–97.8)
PLATELET # BLD AUTO: 234 K/UL (ref 164–446)
PMV BLD AUTO: 8.9 FL (ref 9–12.9)
RBC # BLD AUTO: 4.53 M/UL (ref 4.7–6.1)
WBC # BLD AUTO: 8.2 K/UL (ref 4.8–10.8)

## 2024-06-29 PROCEDURE — 36415 COLL VENOUS BLD VENIPUNCTURE: CPT

## 2024-06-29 PROCEDURE — 85027 COMPLETE CBC AUTOMATED: CPT

## 2024-07-03 ENCOUNTER — OFFICE VISIT (OUTPATIENT)
Dept: INTERNAL MEDICINE | Facility: OTHER | Age: 89
End: 2024-07-03
Payer: MEDICARE

## 2024-07-03 VITALS
WEIGHT: 230.8 LBS | OXYGEN SATURATION: 95 % | SYSTOLIC BLOOD PRESSURE: 111 MMHG | HEIGHT: 67 IN | BODY MASS INDEX: 36.22 KG/M2 | HEART RATE: 70 BPM | DIASTOLIC BLOOD PRESSURE: 69 MMHG | TEMPERATURE: 97.8 F

## 2024-07-03 DIAGNOSIS — R42 ORTHOSTATIC DIZZINESS: ICD-10-CM

## 2024-07-03 DIAGNOSIS — D64.9 ANEMIA, UNSPECIFIED TYPE: ICD-10-CM

## 2024-07-03 PROCEDURE — 93000 ELECTROCARDIOGRAM COMPLETE: CPT | Performed by: INTERNAL MEDICINE

## 2024-07-03 PROCEDURE — 99214 OFFICE O/P EST MOD 30 MIN: CPT | Performed by: INTERNAL MEDICINE

## 2024-07-03 ASSESSMENT — FIBROSIS 4 INDEX: FIB4 SCORE: 1.54

## 2024-08-03 ENCOUNTER — HOSPITAL ENCOUNTER (OUTPATIENT)
Dept: LAB | Facility: MEDICAL CENTER | Age: 89
End: 2024-08-03
Attending: INTERNAL MEDICINE
Payer: MEDICARE

## 2024-08-03 DIAGNOSIS — D64.9 ANEMIA, UNSPECIFIED TYPE: ICD-10-CM

## 2024-08-03 LAB
ERYTHROCYTE [DISTWIDTH] IN BLOOD BY AUTOMATED COUNT: 54.6 FL (ref 35.9–50)
FERRITIN SERPL-MCNC: 71 NG/ML (ref 22–322)
HCT VFR BLD AUTO: 42.4 % (ref 42–52)
HGB BLD-MCNC: 13.8 G/DL (ref 14–18)
IRON SATN MFR SERPL: 22 % (ref 15–55)
IRON SERPL-MCNC: 65 UG/DL (ref 50–180)
MCH RBC QN AUTO: 31 PG (ref 27–33)
MCHC RBC AUTO-ENTMCNC: 32.5 G/DL (ref 32.3–36.5)
MCV RBC AUTO: 95.3 FL (ref 81.4–97.8)
PLATELET # BLD AUTO: 226 K/UL (ref 164–446)
PMV BLD AUTO: 9 FL (ref 9–12.9)
RBC # BLD AUTO: 4.45 M/UL (ref 4.7–6.1)
TIBC SERPL-MCNC: 299 UG/DL (ref 250–450)
UIBC SERPL-MCNC: 234 UG/DL (ref 110–370)
WBC # BLD AUTO: 7.3 K/UL (ref 4.8–10.8)

## 2024-08-03 PROCEDURE — 36415 COLL VENOUS BLD VENIPUNCTURE: CPT

## 2024-08-03 PROCEDURE — 83540 ASSAY OF IRON: CPT

## 2024-08-03 PROCEDURE — 85027 COMPLETE CBC AUTOMATED: CPT

## 2024-08-03 PROCEDURE — 82728 ASSAY OF FERRITIN: CPT

## 2024-08-03 PROCEDURE — 83550 IRON BINDING TEST: CPT

## 2024-08-09 ENCOUNTER — TELEPHONE (OUTPATIENT)
Dept: INTERNAL MEDICINE | Facility: OTHER | Age: 89
End: 2024-08-09
Payer: MEDICARE

## 2024-08-09 DIAGNOSIS — D64.9 ANEMIA, UNSPECIFIED TYPE: ICD-10-CM

## 2024-10-03 ENCOUNTER — OFFICE VISIT (OUTPATIENT)
Dept: INTERNAL MEDICINE | Facility: OTHER | Age: 89
End: 2024-10-03
Payer: MEDICARE

## 2024-10-03 VITALS
WEIGHT: 233 LBS | HEIGHT: 67 IN | OXYGEN SATURATION: 94 % | DIASTOLIC BLOOD PRESSURE: 62 MMHG | TEMPERATURE: 97.2 F | SYSTOLIC BLOOD PRESSURE: 105 MMHG | HEART RATE: 75 BPM | BODY MASS INDEX: 36.57 KG/M2

## 2024-10-03 DIAGNOSIS — D64.9 ANEMIA, UNSPECIFIED TYPE: ICD-10-CM

## 2024-10-03 DIAGNOSIS — I10 ESSENTIAL HYPERTENSION: ICD-10-CM

## 2024-10-03 DIAGNOSIS — Z23 NEED FOR VACCINATION: ICD-10-CM

## 2024-10-03 DIAGNOSIS — F32.A DEPRESSION, UNSPECIFIED DEPRESSION TYPE: ICD-10-CM

## 2024-10-03 DIAGNOSIS — E03.9 HYPOTHYROIDISM, UNSPECIFIED TYPE: ICD-10-CM

## 2024-10-03 DIAGNOSIS — I48.91 ATRIAL FIBRILLATION, UNSPECIFIED TYPE (HCC): ICD-10-CM

## 2024-10-03 DIAGNOSIS — R09.82 POST-NASAL DRIP: ICD-10-CM

## 2024-10-03 PROCEDURE — 99214 OFFICE O/P EST MOD 30 MIN: CPT | Mod: 25 | Performed by: INTERNAL MEDICINE

## 2024-10-03 PROCEDURE — 90662 IIV NO PRSV INCREASED AG IM: CPT | Performed by: INTERNAL MEDICINE

## 2024-10-03 PROCEDURE — 3074F SYST BP LT 130 MM HG: CPT | Performed by: INTERNAL MEDICINE

## 2024-10-03 PROCEDURE — G0008 ADMIN INFLUENZA VIRUS VAC: HCPCS | Performed by: INTERNAL MEDICINE

## 2024-10-03 PROCEDURE — 3078F DIAST BP <80 MM HG: CPT | Performed by: INTERNAL MEDICINE

## 2024-10-03 RX ORDER — CITALOPRAM HYDROBROMIDE 20 MG/1
20 TABLET ORAL DAILY
Qty: 30 TABLET | Refills: 1 | Status: SHIPPED | OUTPATIENT
Start: 2024-10-03

## 2024-10-03 ASSESSMENT — FIBROSIS 4 INDEX: FIB4 SCORE: 1.61

## 2024-10-03 ASSESSMENT — PAIN SCALES - GENERAL: PAINLEVEL: NO PAIN

## 2024-10-04 ENCOUNTER — TELEPHONE (OUTPATIENT)
Dept: INTERNAL MEDICINE | Facility: OTHER | Age: 89
End: 2024-10-04
Payer: MEDICARE

## 2024-11-11 ENCOUNTER — TELEPHONE (OUTPATIENT)
Dept: CARDIOLOGY | Facility: MEDICAL CENTER | Age: 89
End: 2024-11-11
Payer: MEDICARE

## 2024-11-16 ENCOUNTER — HOSPITAL ENCOUNTER (OUTPATIENT)
Dept: LAB | Facility: MEDICAL CENTER | Age: 89
End: 2024-11-16
Attending: INTERNAL MEDICINE
Payer: MEDICARE

## 2024-11-16 DIAGNOSIS — E03.9 HYPOTHYROIDISM, UNSPECIFIED TYPE: ICD-10-CM

## 2024-11-16 DIAGNOSIS — I10 ESSENTIAL HYPERTENSION: ICD-10-CM

## 2024-11-16 DIAGNOSIS — D64.9 ANEMIA, UNSPECIFIED TYPE: ICD-10-CM

## 2024-11-16 LAB
ANION GAP SERPL CALC-SCNC: 12 MMOL/L (ref 7–16)
BASOPHILS # BLD AUTO: 0.3 % (ref 0–1.8)
BASOPHILS # BLD: 0.02 K/UL (ref 0–0.12)
BUN SERPL-MCNC: 23 MG/DL (ref 8–22)
CALCIUM SERPL-MCNC: 9.6 MG/DL (ref 8.5–10.5)
CHLORIDE SERPL-SCNC: 103 MMOL/L (ref 96–112)
CO2 SERPL-SCNC: 24 MMOL/L (ref 20–33)
CREAT SERPL-MCNC: 1.02 MG/DL (ref 0.5–1.4)
EOSINOPHIL # BLD AUTO: 0.18 K/UL (ref 0–0.51)
EOSINOPHIL NFR BLD: 2.3 % (ref 0–6.9)
ERYTHROCYTE [DISTWIDTH] IN BLOOD BY AUTOMATED COUNT: 56.4 FL (ref 35.9–50)
FOLATE SERPL-MCNC: >40 NG/ML
GFR SERPLBLD CREATININE-BSD FMLA CKD-EPI: 71 ML/MIN/1.73 M 2
GLUCOSE SERPL-MCNC: 132 MG/DL (ref 65–99)
HCT VFR BLD AUTO: 44.1 % (ref 42–52)
HGB BLD-MCNC: 14.2 G/DL (ref 14–18)
IMM GRANULOCYTES # BLD AUTO: 0.05 K/UL (ref 0–0.11)
IMM GRANULOCYTES NFR BLD AUTO: 0.7 % (ref 0–0.9)
LDH SERPL L TO P-CCNC: 177 U/L (ref 107–266)
LYMPHOCYTES # BLD AUTO: 1.24 K/UL (ref 1–4.8)
LYMPHOCYTES NFR BLD: 16.1 % (ref 22–41)
MCH RBC QN AUTO: 32.1 PG (ref 27–33)
MCHC RBC AUTO-ENTMCNC: 32.2 G/DL (ref 32.3–36.5)
MCV RBC AUTO: 99.5 FL (ref 81.4–97.8)
MONOCYTES # BLD AUTO: 0.72 K/UL (ref 0–0.85)
MONOCYTES NFR BLD AUTO: 9.4 % (ref 0–13.4)
NEUTROPHILS # BLD AUTO: 5.47 K/UL (ref 1.82–7.42)
NEUTROPHILS NFR BLD: 71.2 % (ref 44–72)
NRBC # BLD AUTO: 0 K/UL
NRBC BLD-RTO: 0 /100 WBC (ref 0–0.2)
PLATELET # BLD AUTO: 251 K/UL (ref 164–446)
PMV BLD AUTO: 8.9 FL (ref 9–12.9)
POTASSIUM SERPL-SCNC: 5.1 MMOL/L (ref 3.6–5.5)
RBC # BLD AUTO: 4.43 M/UL (ref 4.7–6.1)
SODIUM SERPL-SCNC: 139 MMOL/L (ref 135–145)
T4 FREE SERPL-MCNC: 0.77 NG/DL (ref 0.93–1.7)
TSH SERPL DL<=0.005 MIU/L-ACNC: 5.66 UIU/ML (ref 0.38–5.33)
VIT B12 SERPL-MCNC: 895 PG/ML (ref 211–911)
WBC # BLD AUTO: 7.7 K/UL (ref 4.8–10.8)

## 2024-11-16 PROCEDURE — 84439 ASSAY OF FREE THYROXINE: CPT

## 2024-11-16 PROCEDURE — 85025 COMPLETE CBC W/AUTO DIFF WBC: CPT

## 2024-11-16 PROCEDURE — 83615 LACTATE (LD) (LDH) ENZYME: CPT

## 2024-11-16 PROCEDURE — 36415 COLL VENOUS BLD VENIPUNCTURE: CPT

## 2024-11-16 PROCEDURE — 84443 ASSAY THYROID STIM HORMONE: CPT

## 2024-11-16 PROCEDURE — 80048 BASIC METABOLIC PNL TOTAL CA: CPT

## 2024-11-16 PROCEDURE — 82607 VITAMIN B-12: CPT

## 2024-11-16 PROCEDURE — 82746 ASSAY OF FOLIC ACID SERUM: CPT

## 2024-11-20 ENCOUNTER — OFFICE VISIT (OUTPATIENT)
Dept: CARDIOLOGY | Facility: MEDICAL CENTER | Age: 89
End: 2024-11-20
Attending: INTERNAL MEDICINE
Payer: MEDICARE

## 2024-11-20 VITALS
DIASTOLIC BLOOD PRESSURE: 66 MMHG | BODY MASS INDEX: 37.04 KG/M2 | WEIGHT: 236 LBS | HEART RATE: 72 BPM | HEIGHT: 67 IN | SYSTOLIC BLOOD PRESSURE: 116 MMHG | RESPIRATION RATE: 16 BRPM | OXYGEN SATURATION: 92 %

## 2024-11-20 DIAGNOSIS — E78.00 PURE HYPERCHOLESTEROLEMIA: ICD-10-CM

## 2024-11-20 DIAGNOSIS — I10 ESSENTIAL HYPERTENSION: ICD-10-CM

## 2024-11-20 DIAGNOSIS — I48.0 PAF (PAROXYSMAL ATRIAL FIBRILLATION) (HCC): ICD-10-CM

## 2024-11-20 DIAGNOSIS — I71.21 ANEURYSM OF ASCENDING AORTA WITHOUT RUPTURE (HCC): ICD-10-CM

## 2024-11-20 PROCEDURE — 3074F SYST BP LT 130 MM HG: CPT | Performed by: INTERNAL MEDICINE

## 2024-11-20 PROCEDURE — 99212 OFFICE O/P EST SF 10 MIN: CPT | Performed by: INTERNAL MEDICINE

## 2024-11-20 PROCEDURE — 3078F DIAST BP <80 MM HG: CPT | Performed by: INTERNAL MEDICINE

## 2024-11-20 PROCEDURE — 99213 OFFICE O/P EST LOW 20 MIN: CPT | Performed by: INTERNAL MEDICINE

## 2024-11-20 PROCEDURE — 99204 OFFICE O/P NEW MOD 45 MIN: CPT | Performed by: INTERNAL MEDICINE

## 2024-11-20 RX ORDER — LISINOPRIL 10 MG/1
10 TABLET ORAL DAILY
COMMUNITY

## 2024-11-20 ASSESSMENT — FIBROSIS 4 INDEX: FIB4 SCORE: 1.45

## 2024-11-21 ENCOUNTER — OFFICE VISIT (OUTPATIENT)
Dept: INTERNAL MEDICINE | Facility: OTHER | Age: 89
End: 2024-11-21
Payer: MEDICARE

## 2024-11-21 VITALS
DIASTOLIC BLOOD PRESSURE: 74 MMHG | SYSTOLIC BLOOD PRESSURE: 119 MMHG | OXYGEN SATURATION: 99 % | TEMPERATURE: 97 F | WEIGHT: 238 LBS | BODY MASS INDEX: 37.35 KG/M2 | RESPIRATION RATE: 14 BRPM | HEART RATE: 73 BPM | HEIGHT: 67 IN

## 2024-11-21 DIAGNOSIS — R09.89 CHEST CONGESTION: ICD-10-CM

## 2024-11-21 DIAGNOSIS — D75.89 MACROCYTOSIS: ICD-10-CM

## 2024-11-21 DIAGNOSIS — I48.91 ATRIAL FIBRILLATION, UNSPECIFIED TYPE (HCC): ICD-10-CM

## 2024-11-21 DIAGNOSIS — E03.9 HYPOTHYROIDISM, UNSPECIFIED TYPE: ICD-10-CM

## 2024-11-21 DIAGNOSIS — F32.A DEPRESSION, UNSPECIFIED DEPRESSION TYPE: ICD-10-CM

## 2024-11-21 PROCEDURE — 3074F SYST BP LT 130 MM HG: CPT | Performed by: INTERNAL MEDICINE

## 2024-11-21 PROCEDURE — 99214 OFFICE O/P EST MOD 30 MIN: CPT | Performed by: INTERNAL MEDICINE

## 2024-11-21 PROCEDURE — 3078F DIAST BP <80 MM HG: CPT | Performed by: INTERNAL MEDICINE

## 2024-11-21 ASSESSMENT — FIBROSIS 4 INDEX: FIB4 SCORE: 1.45

## 2024-11-21 NOTE — PROGRESS NOTES
"Chief Complaint   Patient presents with    Atrial Fibrillation     New patient        Subjective     Sammy Yanez is a 89 y.o. male who presents today for our initial consultation for patient of Dr. Sanchez who has retired.  He has a history of paroxysmal atrial fibrillation not on OAC, thoracic aortic aneurysm deemed a nonsurgical candidate by Dr. Tuttle and the patient and recent assessments, nonischemic cardiomyopathy recovered EF previously hypertension and hyperlipidemia.  Unfortunately experienced the loss of his wife and per his family member in attendance who is also translating has been having difficulty with this ongoing.  He denies any exertional symptoms.  He was recommended OAC/stroke prevention management in the past and they indicate further discussions were to be had but their cardiologist retired.  He does not smoke drink excessively or do drugs has no family history precocious CAD.  He is not interested in aggressive care or surgical procedures.  He may be open to percutaneous procedures per his daughter.    Past Medical History:   Diagnosis Date    Arrhythmia     Arthritis 09/07/2021    Lumbar area and shoulders.    Cataract     Bilateral IOL's.    COPD (chronic obstructive pulmonary disease) ([facility])     9/7/2021 - states pt not diagnosed with COPD, pulmonary referrel pending, uses inhaler daily at this time.    Dental disorder     Several teeth missing/pulled.    High cholesterol 09/07/2021    No medication at this time, daughter will follow up with PCP.    History of UTI     Hypertension     Psychiatric problem 09/07/2021    Bipolar - no medication.    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     Past Surgical History:   Procedure Laterality Date    OTHER  2004    \"Prostate cleaned out\"    APPENDECTOMY      Around age 35-41.    CHOLECYSTECTOMY      Around age 35-41.     History reviewed. No pertinent family history.  Social History     Socioeconomic History    Marital " status:      Spouse name: Not on file    Number of children: Not on file    Years of education: Not on file    Highest education level: Not on file   Occupational History    Not on file   Tobacco Use    Smoking status: Former     Current packs/day: 0.25     Average packs/day: 0.3 packs/day for 20.0 years (5.0 ttl pk-yrs)     Types: Cigarettes    Smokeless tobacco: Never    Tobacco comments:     Quit around age 41.   Vaping Use    Vaping status: Never Used   Substance and Sexual Activity    Alcohol use: Not Currently    Drug use: Never    Sexual activity: Not on file   Other Topics Concern    Not on file   Social History Narrative    Not on file     Social Drivers of Health     Financial Resource Strain: Not on file   Food Insecurity: Not on file   Transportation Needs: Not on file   Physical Activity: Not on file   Stress: Not on file   Social Connections: Not on file   Intimate Partner Violence: Not on file   Housing Stability: Not on file     No Known Allergies  Outpatient Encounter Medications as of 11/20/2024   Medication Sig Dispense Refill    lisinopril (PRINIVIL) 10 MG Tab Take 10 mg by mouth every day.      apixaban (ELIQUIS) 5mg Tab Take 1 Tablet by mouth 2 times a day. 180 Tablet 3    citalopram (CELEXA) 20 MG Tab Take 1 Tablet by mouth every day. 30 Tablet 1    levothyroxine (SYNTHROID) 25 MCG Tab Take 0.5 Tablets by mouth every morning on an empty stomach. 45 Tablet 3    atorvastatin (LIPITOR) 40 MG Tab Take 1 Tablet by mouth every day. Take 1 tablet by mouth every night at bedtime 90 Tablet 1    metoprolol SR (TOPROL XL) 50 MG TABLET SR 24 HR Take 50 mg by mouth every day.      metoprolol SR (TOPROL XL) 100 MG TABLET SR 24 HR at bedtime.      [DISCONTINUED] fluticasone (FLONASE) 50 MCG/ACT nasal spray ADMINISTER ONE SPRAY IN AFFECTED NOSTRIL(S) EVERY DAY (Patient not taking: Reported on 11/20/2024) 16 g 1    [DISCONTINUED] tiotropium (SPIRIVA HANDIHALER) 18 MCG Cap Place 1 Capsule into inhaler  "and inhale every day. (Patient not taking: Reported on 11/20/2024) 30 Capsule 3    [DISCONTINUED] guaiFENesin ER (MUCINEX) 600 MG TABLET SR 12 HR Take 1 Tablet by mouth every 12 hours as needed for Cough. (Patient not taking: Reported on 11/20/2024) 60 Tablet 1    [DISCONTINUED] fexofenadine (ALLEGRA) 60 MG Tab Take 1 Tablet by mouth every 12 hours as needed (phlegm). (Patient not taking: Reported on 11/20/2024) 60 Tablet 1    finasteride (PROSCAR) 5 MG Tab Take 1 Tablet by mouth every day.      [DISCONTINUED] phenazopyridine (PYRIDIUM) 200 MG Tab Take 1 Tablet by mouth 3 times a day. (Patient not taking: Reported on 11/20/2024)      [DISCONTINUED] lisinopril (PRINIVIL) 5 MG Tab Take 1 Tablet by mouth every morning. (Patient taking differently: Take 10 mg by mouth every morning.) 90 Tablet 1    [DISCONTINUED] aspirin EC (ECOTRIN) 81 MG Tablet Delayed Response Take 1 Tablet by mouth every day. 90 Tablet 1     No facility-administered encounter medications on file as of 11/20/2024.     Review of Systems   All other systems reviewed and are negative.             Objective     /66 (BP Location: Left arm, Patient Position: Sitting)   Pulse 72   Resp 16   Ht 1.702 m (5' 7\")   Wt 107 kg (236 lb)   SpO2 92%   BMI 36.96 kg/m²     Physical Exam  Vitals and nursing note reviewed.   Constitutional:       General: He is not in acute distress.     Appearance: Normal appearance.   HENT:      Head: Normocephalic and atraumatic.      Right Ear: External ear normal.      Left Ear: External ear normal.      Nose: Nose normal.   Eyes:      Conjunctiva/sclera: Conjunctivae normal.   Cardiovascular:      Rate and Rhythm: Normal rate and regular rhythm.      Pulses: Normal pulses.      Heart sounds: No murmur heard.  Pulmonary:      Effort: Pulmonary effort is normal. No respiratory distress.      Breath sounds: Normal breath sounds.   Abdominal:      General: There is no distension.      Palpations: Abdomen is soft. " "  Musculoskeletal:      Cervical back: No rigidity or tenderness.      Right lower leg: No edema.      Left lower leg: No edema.   Skin:     General: Skin is warm and dry.      Capillary Refill: Capillary refill takes 2 to 3 seconds.   Neurological:      General: No focal deficit present.      Mental Status: He is alert and oriented to person, place, and time.   Psychiatric:         Mood and Affect: Mood normal.         Behavior: Behavior normal.         Thought Content: Thought content normal.       LABS:  Lab Results   Component Value Date/Time    CHOLSTRLTOT 140 04/16/2024 12:57 PM    LDL 61 04/16/2024 12:57 PM    HDL 59 04/16/2024 12:57 PM    TRIGLYCERIDE 98 04/16/2024 12:57 PM       Lab Results   Component Value Date/Time    WBC 7.7 11/16/2024 01:59 PM    RBC 4.43 (L) 11/16/2024 01:59 PM    HEMOGLOBIN 14.2 11/16/2024 01:59 PM    HEMATOCRIT 44.1 11/16/2024 01:59 PM    MCV 99.5 (H) 11/16/2024 01:59 PM    NEUTSPOLYS 71.20 11/16/2024 01:59 PM    LYMPHOCYTES 16.10 (L) 11/16/2024 01:59 PM    MONOCYTES 9.40 11/16/2024 01:59 PM    EOSINOPHILS 2.30 11/16/2024 01:59 PM    BASOPHILS 0.30 11/16/2024 01:59 PM     Lab Results   Component Value Date/Time    SODIUM 139 11/16/2024 01:59 PM    POTASSIUM 5.1 11/16/2024 01:59 PM    CHLORIDE 103 11/16/2024 01:59 PM    CO2 24 11/16/2024 01:59 PM    GLUCOSE 132 (H) 11/16/2024 01:59 PM    BUN 23 (H) 11/16/2024 01:59 PM    CREATININE 1.02 11/16/2024 01:59 PM    CREATININE 0.8 11/07/2005 05:25 AM         Lab Results   Component Value Date/Time    ALKPHOSPHAT 52 04/16/2024 12:57 PM    ASTSGOT 16 04/16/2024 12:57 PM    ALTSGPT 15 04/16/2024 12:57 PM    TBILIRUBIN 0.4 04/16/2024 12:57 PM      No results found for: \"BNPBTYPENAT\"   No results found for: \"TSH\"  Lab Results   Component Value Date/Time    PROTHROMBTM 13.5 09/20/2021 07:40 AM    INR 1.06 09/20/2021 07:40 AM        Imaging reviewed         Assessment & Plan     1. PAF (paroxysmal atrial fibrillation) (HCC)  apixaban (ELIQUIS) 5mg " Tab      2. Aneurysm of ascending aorta without rupture (HCC)        3. Essential hypertension        4. Pure hypercholesterolemia            Medical Decision Making: Today's Assessment/Status/Plan:          Overall feeling physically well with recovered EF a nonischemic etiology per outside records, PAF not on OAC and an thoracic aortic aneurysm that he is not interested in having intervened on and was deemed surgical candidate by surgery.  The most pressing current issue surrounds his lack of stroke prevention for PAF and high stroke risk score.  He does not fall apparently has good nutrition and does not live alone.  I recommend oral anticoagulation versus a watchman.  There would like to consider a watchman and I will provide him with a handout in the meantime they would like to initiate OAC if affordable.  Recommend DOAC and together we have chosen Eliquis 5 mg twice daily.  Sinus rhythm today.  Recommended improved efforts towards cardiovascular activity and discussed restrictions regarding his nonoperative thoracic aortic aneurysm management.  Blood pressure is good medical therapy is appropriate.  Can follow-up routinely.  They will call if they decide upon Watchman.    Thank you for this interesting consultation. It was my pleasure to see Sammy Yanez today.    Bruce Romo MD, FACC, [facility]  Division of Interventional Cardiology  [facility]

## 2024-11-21 NOTE — PROGRESS NOTES
Chief Complaint   Patient presents with    Lab Results    Follow-Up     Cardiology follow up        HPI: Sammy Yanez is a 88 y.o. male with past medical history as below  who presented to the clinic for the following.    Here to discuss blood work and the following .    *Paroxysmal atrial fibrillation.  -Patient is currently on metoprolol-50 mg in the morning and 100 mg at nighttime, was not on any anticoagulation, was following up with cardiology for the same.    -Recently required new referral for cardiology given [facility] of previous cardiologist Dr. Ding  -Has since then established with Dr. Romo who initiated Eliquis.  Patient is yet to start taking the medication has not picked it up.  -Watchman's device procedure was also discussed with patient.  Patient wants to discuss with me regarding whether they should pursue the procedure.  Explained few of the risks and benefits as per my knowledge.  Encouraged to discuss this with the cardiologist if they have questions as well.  Next appointment with cardiology in 6 months.  -Educated patient regarding bleeding risk associated with Eliquis.    *Hypothyroidism   On 12.5mcg of levothyroxine   TSH > 5.580 > 4.11 after starting 12.5mcg > 3.630 at 6 week repeat without dose change .  However recent repeat showing high TSH of 5.660 with low free T4.  Patient confirms that he takes the thyroid medication first and takes food more than half an hour following the thyroid medication, does not combine it with any other medications or food.  Has been taking it regularly.  -Given history of atrial fibrillation, we together decided to repeat his thyroid function study in 6 weeks prior to changing any dose.  -Another potential cause for this discrepancy may be that patient is having to split the tablet into half which may or may not be dispensing lower or higher thyroid hormone hence we will repeat hormone levels to confirm.  -Symptom wise patient does  report chronic constipation however even prior to thyroid dysfunction.  Does report low energy fatigue, and weight gain.  Does however have decreased level of activity over the past months which may also be contributing to the weight gain.  Tries to eat healthy however does indulge in fatty foods/noodles      *Depression.  -On the previous visit daughter raised concerns about worsening mood, Celexa dose was increased from 10 mg to 20 mg.  -Daughter has noticed a difference in his mood with this more being more elevated than prior.  -Thyroid dysfunction may be contributing management of the same as above     *Cough   -Associated with phlegm in the back of his throat, nasal congestion  -claritin ineffective, allegra with flonase helping  - effects last for more than a day- .   Denies any orthopnea, PND.   denies any wheezing.  Patient has been on spiriva and albuterol in the past  No pft on file    -On examination there is no exudate appreciated, lungs are symmetrical breath sounds, no added sounds.  -Patient today complains of some chest congestion that is on and off, is currently not using Flonase regularly, not using albuterol, did benefit from albuterol in the past however stopped taking it with no reason as per daughter.  -Also discussed about increasing weight potentially causing difficulty breathing.  -Patient does not appear volume overloaded on examination today.  -Together with patient and daughter decided to work on nutrition, weight loss and use of albuterol as needed for resolution of symptoms.    *Anemia- resolved as per ;last labs   -Patient denies any bleeding in stools, melena, bleeding elsewhere.  -Patient denies any hematuria since last visit.  -Patient will have iron deficiency, recommended getting iron labs, increasing iron in diet for now.  *Macrocytosis- stable < 100, previously seen mild anemia - now normalized     *Fatigue  -Initially described as dizziness by daughter, later clarified is just  feeling tired wanting to sleep more  -Recently patient has been feeling easily fatigued, more sleepy.  Confirmed that there was no confusion, weakness speech abnormality.    -Since last visit we did increase the dose of Celexa to 20 mg with notable elevation of mood as per daughter.  -However energy level/excessive sleep still persist.  -Patient is not active as prior.  Has gained weight, is not following a strict diet, tends to indulge in fatty foods/noodles  -Thyroid function abnormalities as detailed above  -Recent CBC showing anemia however resolved.  -Macrocytosis that is chronic requiring routine follow-up.  -Neuroexam unremarkable  -Has history of obstructive sleep apnea, not using CPAP as unable to tolerate.                                       Other problems not discussed on this visit include    *Obstructive sleep apnea.  -Patient is currently not using CPAP as he was not able to tolerate the machine.   *Low vitamin B6 - resolved   No symptoms of malabsorption - alcohol use not discussed  Does report some falls, no neuropathy reported  Repeat - high - supplement stopped    B12 normal   B1 normal   *Dizziness.  -Patient reports that he experiences this about once every 2 weeks, only when he stands up once he starts walking gets better.  This is very different from the fatigue that he has experienced in the past.  -Patient has decreased level of activity with weather changes and is not walking as much as he did before.  -Patient is staying well-hydrated.  -Patient does have a history of atrial fibrillation with RVR, currently in sinus rhythm, seen by cardiology who detected sinus rhythm with APCs, also history of second-degree Mobitz type II however asymptomatic with 6-month follow-up recommended.  -Patient did not have dizziness at the time, follows up today in clinic and has dizziness at this time.  -Denies any blurry vision, dizziness with neck movements.  -Neuroexam within normal limits.  -EKG repeated.   Today shows- first degree AV block .    -Plan.  -Patient recommended to wear compression stockings, continue to stay well-hydrated.  -Increase level of activity.  -If dizziness becoming more frequent - consider Holter monitor for rule out second-degree block contributing to it   *Hematuria   -Resolved  -History of RCC status post ablation   Last UA showing WBC and RBCs   -Renal function shows improvement - BUN still slightly elevated, patient is staying well hydrated   Patient also had a CT scan through urology that did not show any new mass.   -Patient has since had cystoscopy that reviewed enlarged prostate as per patient's daughter recommended TURP however daughter is reluctant to have patient under anesthesia for the procedure, besides hematuria has resolved.  Patient was also started on finasteride   -Discussed possibility of putting off procedure if not bothersome, and also having a discussion with urology to see if that is appropriate       *Fall history.- none recently  -Patient reported that he has very bad bilateral knees, and hip pain along with low back pain.  -On examination patient did have tenderness to palpation in the lumbar midline, patient did have x-ray done after the fall which did not show any fractures but did show degenerative changes including sacroiliitis.  -On hip examination patient did  have painful internal and external range of motion of the left hip in the anterior area, there was pain reported on the left hip however examination is normal today.  -Patient did have symmetrical strength on bilateral lower extremities, sensation not impacted.  -Straight leg test was negative.  -physical therapy referral was placed -she has not pursued physical therapy with significant benefit, no falls since, patient is able to get down from the examination table without much difficulty compared to before, required slight assistance to climb up on the examination table.  depression, cognitive  decline  Patient is [place] only speaking, is accompanied by her daughter today.  Mood symptoms   -more depressed than normal since spouse;s death in September after 62 years of marriage   Patient had been on mirtazapine even prior to spouse's death.  -Patient has been found talking to the urn, crying a lot.  Sleeping a lot.  Rarely goes outside anymore.  -Normally he expresses his frustration regarding general matters to his wife however now he is currently living with his son and this has been difficult for him as well.  - sometimes sees things like shadows and hears basals and other sounds which are not present.  He is also is reported to have seen a light coming from the urn, and sometimes wishes that he could join his wife.   -No intent for suicide, daughter confirms that that is not in favor of his believes at all.   -Of note patient was briefly discontinued off mirtazapine due to the possibility of it being a reactive depression secondary to wife's passing however he has had to go back on it as per daughter.  When it was first started patient was also having weight loss and decreased appetite, however his weight has been trending up based on most recent trends, BMI of 34.93 at this time.  -Started on citalopram on last visit and daughter reports that she has noticed improvement and that he is not sleeping a lot like before   Repeat PHQ in Latvian on file     *Essential hypertension   -Well-controlled on this visit, currently on metoprolol 150, lisinopril 5 mg.   *Dyslipidemia-patient is on 40 mg atorvastatin.  Social history, family history not discussed on this visit.pending reestablishing visit in 6 weeks   Orthostatic dizziness   Preventative health.  -Vaccinations not available in our clinic today.  Due to power outage.  Pending pneumococcal shot, zoster and Tdap-encouraged to get them at pharmacy  -Colonoscopy aged out.  -Former smoker as per chart, AAA screening to be discussed on the next visit.  -No  family history of prostate cancer recorded, is following up with urology as well.  Does have history of renal cell carcinoma.  -Is a fall risk however significantly reduced since initiating physical therapy.               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      apixaban (ELIQUIS) 5mg Tab Take 1 Tablet by mouth 2 times a day. 180 Tablet 3    citalopram (CELEXA) 20 MG Tab Take 1 Tablet by mouth every day. 30 Tablet 1    finasteride (PROSCAR) 5 MG Tab Take 1 Tablet by mouth every day.      levothyroxine (SYNTHROID) 25 MCG Tab Take 0.5 Tablets by mouth every morning on an empty stomach. 45 Tablet 3    atorvastatin (LIPITOR) 40 MG Tab Take 1 Tablet by mouth every day. Take 1 tablet by mouth every night at bedtime 90 Tablet 1    metoprolol SR (TOPROL XL) 50 MG TABLET SR 24 HR Take 50 mg by mouth every day.      metoprolol SR (TOPROL XL) 100 MG TABLET SR 24 HR at bedtime.       No current facility-administered medications for this visit.       ROS: As per HPI. Additional pertinent systems as noted below.  Constitutional:  Negative for fever, chills   HEENt : postive as in  "hpi   Cardiovascular:  Negative for chest pain, palpitations   Respiratory:  positive as in hpi     /74   Pulse 73   Temp 36.1 °C (97 °F) (Temporal)   Resp 14   Ht 1.702 m (5' 7\")   Wt 108 kg (238 lb)   SpO2 99%   BMI 37.28 kg/m²     Physical Exam   Constitutional:   Not in acute distress   CVS /RESPI: normal S1 and s2 no added sounded, no murmurs, symmetrical breath sounds.       Note: I have reviewed all pertinent labs and diagnostic tests associated with this visit with specific comments listed under the assessment and plan below    Assessment and Plan    1. Hypothyroidism, unspecified type  On 12.5mcg of levothyroxine   TSH > 5.580 > 4.11 after starting 12.5mcg > 3.630 at 6 week repeat without dose change .  However recent repeat showing high TSH of 5.660 with low free T4.  Patient confirms that he takes the thyroid medication first and takes food more than half an hour following the thyroid medication, does not combine it with any other medications or food.  Has been taking it regularly.  -Another potential cause for this discrepancy may be that patient is having to split the tablet into half which may or may not be dispensing lower or higher thyroid hormone hence we will repeat hormone levels to confirm.  -Symptom wise patient does report chronic constipation however even prior to thyroid dysfunction.  Does report low energy fatigue, and weight gain.  Does however have decreased level of activity over the past months which may also be contributing to the weight gain.  Tries to eat healthy however does indulge in fatty foods/noodles    -Given history of atrial fibrillation, we together decided to repeat his thyroid function study in 6 weeks prior to changing any dose.    - TSH WITH REFLEX TO FT4; Future    2. Depression, unspecified depression type  -On the previous visit daughter raised concerns about worsening mood, Celexa dose was increased from 10 mg to 20 mg.  -Daughter has noticed a " difference in his mood with this more being more elevated than prior.  -Thyroid dysfunction may be contributing management of the same as above    3. Atrial fibrillation, unspecified type (HCC)  -Patient is currently on metoprolol-50 mg in the morning and 100 mg at nighttime, was not on any anticoagulation, was following up with cardiology for the same.    -Recently required new referral for cardiology given correction of previous cardiologist Dr. Ding  -Has since then established with Dr. Romo who initiated Eliquis.  Patient is yet to start taking the medication has not picked it up.  -Watchman's device procedure was also discussed with patient.  Patient wants to discuss with me regarding whether they should pursue the procedure.  Explained few of the risks and benefits as per my knowledge.  Encouraged to discuss this with the cardiologist if they have questions as well.  Next appointment with cardiology in 6 months.  -Educated patient regarding bleeding risk associated with Eliquis.    4. Macrocytosis  stable < 100, previously seen mild anemia - now normalized      5. Chest congestion  claritin ineffective, allegra with flonase helping  - effects last for more than a day- .   Also discussed about increasing weight potentially causing difficulty breathing.  -Patient does not appear volume overloaded on examination today.  -Together with patient and daughter decided to work on nutrition, weight loss and use of albuterol as needed for resolution of symptoms.    Followup: No follow-ups on file.        Signed by: Jj Molina M.D.

## 2024-11-23 PROBLEM — D75.89 MACROCYTOSIS: Status: ACTIVE | Noted: 2024-11-23

## 2024-11-23 PROBLEM — R09.89 CHEST CONGESTION: Status: ACTIVE | Noted: 2024-11-23

## 2024-12-02 DIAGNOSIS — F32.A DEPRESSION, UNSPECIFIED DEPRESSION TYPE: ICD-10-CM

## 2024-12-02 RX ORDER — CITALOPRAM HYDROBROMIDE 20 MG/1
20 TABLET ORAL DAILY
Qty: 90 TABLET | Refills: 1 | Status: SHIPPED | OUTPATIENT
Start: 2024-12-02

## 2024-12-02 NOTE — TELEPHONE ENCOUNTER
Received request via: Pharmacy    Was the patient seen in the last year in this department? Yes    Does the patient have an active prescription (recently filled or refills available) for medication(s) requested? No    Pharmacy Name: [facility] - [facility]    Does the patient have group home Plus and need 100-day supply? (This applies to ALL medications) Patient does not have SCP

## 2024-12-27 ENCOUNTER — TELEPHONE (OUTPATIENT)
Dept: INTERNAL MEDICINE | Facility: OTHER | Age: 89
End: 2024-12-27
Payer: MEDICARE

## 2024-12-28 NOTE — TELEPHONE ENCOUNTER
Patients daughter came in stating Sammy is still having very bad headaches. She is wondering if there is anything else he can do for this as he is on eliquis and not able to take aspirin. Patient has an upcoming appt 1/3

## 2024-12-30 ENCOUNTER — HOSPITAL ENCOUNTER (OUTPATIENT)
Dept: LAB | Facility: MEDICAL CENTER | Age: 89
End: 2024-12-30
Attending: INTERNAL MEDICINE
Payer: MEDICARE

## 2024-12-30 DIAGNOSIS — D64.9 ANEMIA, UNSPECIFIED TYPE: ICD-10-CM

## 2024-12-30 DIAGNOSIS — E03.9 HYPOTHYROIDISM, UNSPECIFIED TYPE: ICD-10-CM

## 2024-12-30 LAB
BASOPHILS # BLD AUTO: 0.3 % (ref 0–1.8)
BASOPHILS # BLD: 0.02 K/UL (ref 0–0.12)
EOSINOPHIL # BLD AUTO: 0.24 K/UL (ref 0–0.51)
EOSINOPHIL NFR BLD: 3.1 % (ref 0–6.9)
ERYTHROCYTE [DISTWIDTH] IN BLOOD BY AUTOMATED COUNT: 55 FL (ref 35.9–50)
HCT VFR BLD AUTO: 42.4 % (ref 42–52)
HGB BLD-MCNC: 13.5 G/DL (ref 14–18)
IMM GRANULOCYTES # BLD AUTO: 0.04 K/UL (ref 0–0.11)
IMM GRANULOCYTES NFR BLD AUTO: 0.5 % (ref 0–0.9)
LYMPHOCYTES # BLD AUTO: 1.55 K/UL (ref 1–4.8)
LYMPHOCYTES NFR BLD: 20.3 % (ref 22–41)
MCH RBC QN AUTO: 31.3 PG (ref 27–33)
MCHC RBC AUTO-ENTMCNC: 31.8 G/DL (ref 32.3–36.5)
MCV RBC AUTO: 98.1 FL (ref 81.4–97.8)
MONOCYTES # BLD AUTO: 0.74 K/UL (ref 0–0.85)
MONOCYTES NFR BLD AUTO: 9.7 % (ref 0–13.4)
NEUTROPHILS # BLD AUTO: 5.06 K/UL (ref 1.82–7.42)
NEUTROPHILS NFR BLD: 66.1 % (ref 44–72)
NRBC # BLD AUTO: 0 K/UL
NRBC BLD-RTO: 0 /100 WBC (ref 0–0.2)
PLATELET # BLD AUTO: 249 K/UL (ref 164–446)
PMV BLD AUTO: 8.7 FL (ref 9–12.9)
RBC # BLD AUTO: 4.32 M/UL (ref 4.7–6.1)
TSH SERPL DL<=0.005 MIU/L-ACNC: 5.64 UIU/ML (ref 0.38–5.33)
WBC # BLD AUTO: 7.7 K/UL (ref 4.8–10.8)

## 2024-12-30 PROCEDURE — 85025 COMPLETE CBC W/AUTO DIFF WBC: CPT

## 2024-12-30 PROCEDURE — 84439 ASSAY OF FREE THYROXINE: CPT

## 2024-12-30 PROCEDURE — 82607 VITAMIN B-12: CPT

## 2024-12-30 PROCEDURE — 82746 ASSAY OF FOLIC ACID SERUM: CPT

## 2024-12-30 PROCEDURE — 36415 COLL VENOUS BLD VENIPUNCTURE: CPT

## 2024-12-30 PROCEDURE — 84443 ASSAY THYROID STIM HORMONE: CPT

## 2024-12-30 NOTE — TELEPHONE ENCOUNTER
I unfortunately do not recall discussing headache on our last appt - we discussed primarily about chest congestion/phlegm, afib and thyroid.   I will try to call patient /daughter tomorrow to clarify , but if she calls back before that - tylenol is safe to take with eliquis

## 2024-12-31 LAB
FOLATE SERPL-MCNC: 29.9 NG/ML
T4 FREE SERPL-MCNC: 0.82 NG/DL (ref 0.93–1.7)
VIT B12 SERPL-MCNC: 743 PG/ML (ref 211–911)

## 2024-12-31 NOTE — TELEPHONE ENCOUNTER
Called patient's daughter and let her know that tylenol is ok. She was very happy to hear that   She explained that this headache he has had for a long time and is not something new - he also suffers from neck pain and he used to take aspirin for all this however given that now he is on eliquis - he is unable to take aspirin

## 2025-01-02 NOTE — PROGRESS NOTES
Chief Complaint   Patient presents with    Lab Results       HPI: Sammy Yanez is a 88 y.o. male with past medical history as below  who presented to the clinic for the following.    Here to discuss blood work and the following .  Patient also reported dizziness as he walked into the appt - has not been feeling good today     *Dizziness   Patient reports dizziness that is more today - does have the intermittently and chronically - not new   Feeling dizzy at this time , even when seated   Does sometimes feel like the room is spinning, but more so when he stands up he feels the most dizzy and has to wait for a while before he can start walking   Patient denies any other associated symptoms   At this time though reports some left sided chest discomfort that comes on only when he coughs ( and has had this before - not new  or worsening  or exertional )- tender to touch - does not like using albuterol  inhaler - does not know if he would like nebuliser - but willing to try   Uses flonase and mucinex as needed which helps with post nasal drip and chest congestion   EKG done in clinic - not showing afib with rvr or any other acute changes   Patient is on metoprolol and eliquis at this time   Neuro exam positive for rhomberg - proprioception and spine examination not done   Patient does have a history of low back pain, hip pain and knee pain - strength exam today was normal   Denies any saddle anesthesia, bowel bladder symptoms at this time.     *Paroxysmal atrial fibrillation.  -Patient is currently on metoprolol-50 mg in the morning and 100 mg at nighttime, recently established  Dr. Romo who initiated Eliquis.started on eliquis , had discussion about watchman device which has been deferred at this time    Next appointment with cardiology in 6 months.  -Educated patient regarding bleeding risk associated with Eliquis.    *Hypothyroidism   On 12.5mcg of levothyroxine   TSH > 5.580 > 4.11 after starting  12.5mcg > 3.630 at 6 week repeat without dose change .  However recent repeat showing high TSH of 5.660 with low free T4- twice . Patient confirms that he takes the thyroid medication first and takes food more than half an hour following the thyroid medication, does not combine it with any other medications or food.  Has been taking it regularly.  -Symptom wise patient does report chronic constipation however even prior to thyroid dysfunction.  Does report low energy fatigue, and weight gain.  Does however have decreased level of activity over the past months which may also be contributing to the weight gain.  Tries to eat healthy however does indulge in fatty foods/noodles  Discussed about increasing dose of thyroid to 25 mcg and repeating labs in 3 months            *Cough   -Associated with phlegm in the back of his throat, nasal congestion  -claritin ineffective, allegra with flonase helping  - effects last for more than a day- .  Using mucinex as needed   Does still get it which causes - chest discomfort   Previously was prescribed albuterol - patient does not like to use it - he is not sure if he would like nebuliser but willing to try  - we will try to arrange that    Denies any orthopnea, PND.   denies any wheezing.  Patient has been on spiriva and albuterol in the past  No pft on file    -On examination there is no exudate appreciated, lungs are symmetrical breath sounds, no added sounds.  --Also discussed about increasing weight potentially causing difficulty breathing. Adjusting thyroid dose today   Does have history of sleep apnea - not using CPAP - boswell snot like to use it - does not have machine and does not want it   -Patient does not appear volume overloaded on examination today.  -Together with patient and daughter decided to work on nutrition, weight loss as well on last visit - he has been walking more in the house     *Anemia-   -Patient denies any bleeding in stools, melena, bleeding  elsewhere.  -Patient denies any hematuria at this time   -iron labs normal, b12, folate normal   *Macrocytosis- stable < 100,   LDH normal   Stable macrocytic anemia   TSH abnormal - changing dose   No alcohol use   Peripheral Blood smear not abnormal  Repeat Cbc in 3 months with hapto , reticuocyte      *Fatigue  -Initially described as dizziness by daughter, later clarified is just feeling tired wanting to sleep more  -Recently patient has been feeling easily fatigued, more sleepy.  Confirmed that there was no confusion, weakness speech abnormality.    -Since last visit we did increase the dose of Celexa to 20 mg with notable elevation of mood as per daughter.  -However energy level/excessive sleep still persist.  -Patient is not active as prior.  Has gained weight, is not following a strict diet, tends to indulge in fatty foods/noodles  -Thyroid function abnormalities as detailed above- adjusting today   -Recent CBC still showing anemia, not iron deficiency, has macrocytosis - but stable , PBS normal - will repeat in 3 months   -Macrocytosis that is chronic requiring routine follow-up.  -Neuroexam positive rhomberg  -Has history of obstructive sleep apnea, not using CPAP as unable to tolerate.                                       Other problems not discussed on this visit include   *Depression.  -On the previous visit daughter raised concerns about worsening mood, Celexa dose was increased from 10 mg to 20 mg.  -Daughter has noticed a difference in his mood with this more being more elevated than prior.  -Thyroid dysfunction may be contributing management of the same as above   *Obstructive sleep apnea.  -Patient is currently not using CPAP as he was not able to tolerate the machine.   *Low vitamin B6 - resolved   No symptoms of malabsorption - alcohol use not discussed  Does report some falls, no neuropathy reported  Repeat - high - supplement stopped    B12 normal   B1 normal    *Hematuria    -Resolved  -History of RCC status post ablation   Last UA showing WBC and RBCs   -Renal function shows improvement - BUN still slightly elevated, patient is staying well hydrated   Patient also had a CT scan through urology that did not show any new mass.   -Patient has since had cystoscopy that reviewed enlarged prostate as per patient's daughter recommended TURP however daughter is reluctant to have patient under anesthesia for the procedure, besides hematuria has resolved.  Patient was also started on finasteride   -Discussed possibility of putting off procedure if not bothersome, and also having a discussion with urology to see if that is appropriate       *Fall history.- none recently  -Patient reported that he has very bad bilateral knees, and hip pain along with low back pain.  -On examination patient did have tenderness to palpation in the lumbar midline, patient did have x-ray done after the fall which did not show any fractures but did show degenerative changes including sacroiliitis.  -On hip examination patient did  have painful internal and external range of motion of the left hip in the anterior area, there was pain reported on the left hip however examination is normal today.  -Patient did have symmetrical strength on bilateral lower extremities, sensation not impacted.  -Straight leg test was negative.  -physical therapy referral was placed -she has not pursued physical therapy with significant benefit, no falls since, patient is able to get down from the examination table without much difficulty compared to before, required slight assistance to climb up on the examination table.  depression, cognitive decline  Patient is Albanian only speaking, is accompanied by her daughter today.  Mood symptoms   -more depressed than normal since spouse;s death in September after 62 years of marriage   Patient had been on mirtazapine even prior to spouse's death.  -Patient has been found talking to the urn,  crying a lot.  Sleeping a lot.  Rarely goes outside anymore.  -Normally he expresses his frustration regarding general matters to his wife however now he is currently living with his son and this has been difficult for him as well.  - sometimes sees things like shadows and hears basals and other sounds which are not present.  He is also is reported to have seen a light coming from the urn, and sometimes wishes that he could join his wife.   -No intent for suicide, daughter confirms that that is not in favor of his believes at all.   -Of note patient was briefly discontinued off mirtazapine due to the possibility of it being a reactive depression secondary to wife's passing however he has had to go back on it as per daughter.  When it was first started patient was also having weight loss and decreased appetite, however his weight has been trending up based on most recent trends, BMI of 34.93 at this time.  -Started on citalopram on last visit and daughter reports that she has noticed improvement and that he is not sleeping a lot like before   Repeat PHQ in Mexican on file     *Essential hypertension   -Well-controlled on this visit, currently on metoprolol 150, lisinopril 5 mg.   *Dyslipidemia-patient is on 40 mg atorvastatin.  Social history, family history not discussed on this visit.pending reestablishing visit in 6 weeks   Orthostatic dizziness   Preventative health.  -Vaccinations not available in our clinic today.  Due to power outage.  Pending pneumococcal shot, zoster and Tdap-encouraged to get them at pharmacy  -Colonoscopy aged out.  -Former smoker as per chart, AAA screening to be discussed on the next visit.  -No family history of prostate cancer recorded, is following up with urology as well.  Does have history of renal cell carcinoma.  -Is a fall risk however significantly reduced since initiating physical therapy.               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 90 Tablet 3    citalopram (CELEXA) 20 MG Tab TAKE 1 TABLET BY MOUTH EVERY DAY 90 Tablet 1    lisinopril (PRINIVIL) 10 MG Tab Take 10 mg by mouth every day.      apixaban (ELIQUIS) 5mg Tab Take 1 Tablet by mouth 2 times a day. 180 Tablet 3    finasteride (PROSCAR) 5 MG Tab Take 1 Tablet by mouth every day.      atorvastatin (LIPITOR) 40 MG Tab Take 1 Tablet by mouth every day. Take 1 tablet by mouth every night at bedtime 90 Tablet 1    metoprolol SR (TOPROL XL) 50 MG TABLET SR 24 HR Take 50 mg by mouth every day.      metoprolol SR (TOPROL XL) 100 MG TABLET SR 24 HR at bedtime.       No current facility-administered medications for this visit.       ROS: As per HPI. Additional pertinent systems as noted below.  Constitutional:  Negative for fever, chills   HEENt : postive as in hpi   Cardiovascular:  positive as in hpi   Respiratory:  positive as in hpi   Neuro : positive as in hpi       /81 (BP Location: Left arm, Patient Position: Sitting, BP Cuff Size: Adult)   Pulse 72   Temp 36.1 °C (97 °F)  (Temporal)   Wt 109 kg (241 lb)   SpO2 94%   BMI 37.75 kg/m²     Physical Exam   Constitutional:   Not in acute distress   CVS /RESPI: normal S1 and s2 no added sounded, no murmurs, symmetrical breath sounds.   Neuro exam ; strength normal , cranial nerves normal, gait slow ,no tremors, rhomberg positive        Note: I have reviewed all pertinent labs and diagnostic tests associated with this visit with specific comments listed under the assessment and plan below    Assessment and Plan    1. Dizziness  EKG unremarkable for Afib with RVR , or other acute changes   Dizziness description favoring orthostatic dizziness, but also with room spinning sensation with rhomberg positive - hence r/o central causes - MRI brain ordered   Does also have chronic history of low back pain , hip and knee pain - deferred this exam to next visit - also will need to check proprioception.   - is on metoprolol , blood pressure not soft - encouraged hydration - currently drinking 3-4 glasses per day - encouraged to increase to 6-8 glasses   Patient also not using CPAP - but he prefer snot to   Management of chest congestion as below   Continue eliquis and statin   - EKG - Clinic Performed  - MR-BRAIN-W/O; Future    2. 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 Anemia, unspecified type  No iron deficiency, b12, folate normal , no alcohol , thyroid is being adjusted   No bleeding   Will repeat in 3 months - to check on macrocytosis to see if it is stable   Also adding retic count and haptoglobin   LDH was normal in the past   If there is a change in trend of numbers then will consider other bone marrow eval     - CBC WITH DIFFERENTIAL; Future  - HAPTOGLOBIN; Future  - RETICULOCYTES COUNT; Future    4. Hypothyroidism, unspecified type  Consistently low despite taking it correctly   Also having symptoms of hypothyroidism hence will increase the dose from 12.5 to 25 - cautiously given history o fAfib    Repeat labs in 3 months   Also ordered anti tpo antibodies   - levothyroxine (SYNTHROID) 25 MCG Tab; Take 1 Tablet by mouth every morning on an empty stomach.  Dispense: 90 Tablet; Refill: 3  - TSH WITH REFLEX TO FT4; Future  - THYROID PEROXIDASE  (TPO) AB; Future    5. Weight gain  Thyroid abnormal - will adjust today   Patient also reports noticing fat around the neck area - not hard - bilateral   No striae  Monitor with change of thyroid dose     6. Other fatigue  Multifactorial likely from the above   Management of each as above   In addition also has  untreated sleep apnea - he does not wish to try cpap again   Patient also has chronic allergies possible reactive airway disease - management as below   Depression currently well controlled     7. Chest congestion  Trial of nebuliser machine as patient does not like to use albuterol   Was also on spiriva in the past and he does not want to try another  inhaler   Recommended to continue mucinex as needed, antihistamine and flonase        Followup: Return in about 4 months (around 5/3/2025).        Signed by: Jj Molina M.D.

## 2025-01-03 ENCOUNTER — OFFICE VISIT (OUTPATIENT)
Dept: INTERNAL MEDICINE | Facility: OTHER | Age: 89
End: 2025-01-03
Payer: MEDICARE

## 2025-01-03 VITALS
HEART RATE: 72 BPM | BODY MASS INDEX: 37.75 KG/M2 | WEIGHT: 241 LBS | DIASTOLIC BLOOD PRESSURE: 81 MMHG | SYSTOLIC BLOOD PRESSURE: 121 MMHG | TEMPERATURE: 97 F | OXYGEN SATURATION: 94 %

## 2025-01-03 DIAGNOSIS — R63.5 WEIGHT GAIN: ICD-10-CM

## 2025-01-03 DIAGNOSIS — J45.41 MODERATE PERSISTENT REACTIVE AIRWAY DISEASE WITH ACUTE EXACERBATION: ICD-10-CM

## 2025-01-03 DIAGNOSIS — R29.818 ROMBERG TEST POSITIVE: ICD-10-CM

## 2025-01-03 DIAGNOSIS — R09.89 CHEST CONGESTION: ICD-10-CM

## 2025-01-03 DIAGNOSIS — D64.9 ANEMIA, UNSPECIFIED TYPE: ICD-10-CM

## 2025-01-03 DIAGNOSIS — R53.83 OTHER FATIGUE: ICD-10-CM

## 2025-01-03 DIAGNOSIS — R42 DIZZINESS: ICD-10-CM

## 2025-01-03 DIAGNOSIS — E03.9 HYPOTHYROIDISM, UNSPECIFIED TYPE: ICD-10-CM

## 2025-01-03 PROCEDURE — 3074F SYST BP LT 130 MM HG: CPT | Performed by: INTERNAL MEDICINE

## 2025-01-03 PROCEDURE — 93000 ELECTROCARDIOGRAM COMPLETE: CPT | Performed by: INTERNAL MEDICINE

## 2025-01-03 PROCEDURE — 3079F DIAST BP 80-89 MM HG: CPT | Performed by: INTERNAL MEDICINE

## 2025-01-03 PROCEDURE — 99214 OFFICE O/P EST MOD 30 MIN: CPT | Performed by: INTERNAL MEDICINE

## 2025-01-03 RX ORDER — LEVOTHYROXINE SODIUM 25 UG/1
25 TABLET ORAL
Qty: 90 TABLET | Refills: 3 | Status: SHIPPED | OUTPATIENT
Start: 2025-01-03

## 2025-01-03 ASSESSMENT — PATIENT HEALTH QUESTIONNAIRE - PHQ9
SUM OF ALL RESPONSES TO PHQ9 QUESTIONS 1 AND 2: 2
SUM OF ALL RESPONSES TO PHQ QUESTIONS 1-9: 8
7. TROUBLE CONCENTRATING ON THINGS, SUCH AS READING THE NEWSPAPER OR WATCHING TELEVISION: SEVERAL DAYS
2. FEELING DOWN, DEPRESSED, IRRITABLE, OR HOPELESS: SEVERAL DAYS
1. LITTLE INTEREST OR PLEASURE IN DOING THINGS: SEVERAL DAYS
5. POOR APPETITE OR OVEREATING: SEVERAL DAYS
3. TROUBLE FALLING OR STAYING ASLEEP OR SLEEPING TOO MUCH: SEVERAL DAYS
8. MOVING OR SPEAKING SO SLOWLY THAT OTHER PEOPLE COULD HAVE NOTICED. OR THE OPPOSITE, BEING SO FIGETY OR RESTLESS THAT YOU HAVE BEEN MOVING AROUND A LOT MORE THAN USUAL: SEVERAL DAYS
6. FEELING BAD ABOUT YOURSELF - OR THAT YOU ARE A FAILURE OR HAVE LET YOURSELF OR YOUR FAMILY DOWN: SEVERAL DAYS
4. FEELING TIRED OR HAVING LITTLE ENERGY: SEVERAL DAYS
9. THOUGHTS THAT YOU WOULD BE BETTER OFF DEAD, OR OF HURTING YOURSELF: NOT AT ALL

## 2025-01-03 ASSESSMENT — FIBROSIS 4 INDEX: FIB4 SCORE: 1.46

## 2025-01-03 NOTE — PATIENT INSTRUCTIONS
I am increasing the dose of thyroid from 12.5 mcg to 25 mcg.    I am ordering an MRI brain to work up dizziness.   I would recommend drinking 6-8 glasses of water   We have to repeat his hemoglobin to check for anemia in 3 months.   I will also check thyroid number in 3 months   Labs have been ordered.   I am also prescribing nebulizer machine and solution - solution has to be picked up from pharmacy - DME machine takes a few weeks to be delivered by DME company - if not hearing back in next 1-2 weeks give us a call.    I also encourage you to use mucinex

## 2025-01-04 ENCOUNTER — TELEPHONE (OUTPATIENT)
Dept: INTERNAL MEDICINE | Facility: OTHER | Age: 89
End: 2025-01-04
Payer: MEDICARE

## 2025-01-04 PROBLEM — R63.5 WEIGHT GAIN: Status: ACTIVE | Noted: 2025-01-04

## 2025-01-04 RX ORDER — ALBUTEROL SULFATE 0.83 MG/ML
2.5 SOLUTION RESPIRATORY (INHALATION) EVERY 4 HOURS PRN
Qty: 1 EACH | Refills: 1 | Status: SHIPPED | OUTPATIENT
Start: 2025-01-04

## 2025-01-04 RX ORDER — ALBUTEROL SULFATE 0.83 MG/ML
2.5 SOLUTION RESPIRATORY (INHALATION) EVERY 4 HOURS PRN
Qty: 1 EACH | Refills: 1 | Status: SHIPPED | OUTPATIENT
Start: 2025-01-04 | End: 2025-01-04

## 2025-01-27 ENCOUNTER — PATIENT MESSAGE (OUTPATIENT)
Dept: INTERNAL MEDICINE | Facility: OTHER | Age: 89
End: 2025-01-27
Payer: MEDICARE

## 2025-01-28 NOTE — PATIENT COMMUNICATION
Spoke with patient's daughter, Katharine, and informed her that I verified with Sammy's pharmacy ([facility] on [address]) that they have the prescription for the albuterol nebulizer solution but it is not covered by Medicare. The cost of the medication without insurance is $13.79 or a prior authorization would be needed. Katharine stated that there should be no cost due to the patient having Medicare Part D and also Medicaid which absorbs the remaining cost of the medications. She stated that she will call the pharmacy and see what they can do regarding the cost of the medication.

## 2025-02-07 DIAGNOSIS — E78.5 DYSLIPIDEMIA: ICD-10-CM

## 2025-02-07 NOTE — TELEPHONE ENCOUNTER
Received request via: Patient    Was the patient seen in the last year in this department? Yes    Does the patient have an active prescription (recently filled or refills available) for medication(s) requested? No    Pharmacy Name:   [id number] - [place], [place] - [address]  [address] [place] [address]  Phone: [phone] Fax: [phone]       Does the patient have long-term Plus and need 100-day supply? (This applies to ALL medications) Patient does not have SCP

## 2025-02-07 NOTE — TELEPHONE ENCOUNTER
Patient is requesting a medication refill for his Atorvastatin to be sent to [facility] on file.

## 2025-02-10 RX ORDER — ATORVASTATIN CALCIUM 40 MG/1
40 TABLET, FILM COATED ORAL DAILY
Qty: 90 TABLET | Refills: 1 | Status: SHIPPED | OUTPATIENT
Start: 2025-02-10

## 2025-03-26 ENCOUNTER — TELEPHONE (OUTPATIENT)
Dept: INTERNAL MEDICINE | Facility: OTHER | Age: 89
End: 2025-03-26
Payer: MEDICARE

## 2025-04-08 DIAGNOSIS — R05.3 CHRONIC COUGH: ICD-10-CM

## 2025-04-09 DIAGNOSIS — R00.2 PALPITATIONS: ICD-10-CM

## 2025-04-09 DIAGNOSIS — I10 HYPERTENSION, UNSPECIFIED TYPE: ICD-10-CM

## 2025-04-09 NOTE — TELEPHONE ENCOUNTER
Is the patient due for a refill? Yes    Was the patient seen the last 15 months? Yes    Date of last office visit: 11/20/2024    Does the patient have an upcoming appointment?  Yes   If yes, When? 05/27/2025    Provider to refill: TW     Does the patient have [facility] Plus and need 100-day supply? (This applies to ALL medications) Patient does not have SCP

## 2025-04-10 RX ORDER — GUAIFENESIN 600 MG/1
600 TABLET, EXTENDED RELEASE ORAL EVERY 12 HOURS PRN
Qty: 60 TABLET | Refills: 1 | Status: SHIPPED | OUTPATIENT
Start: 2025-04-10

## 2025-04-10 RX ORDER — METOPROLOL SUCCINATE 50 MG/1
50 TABLET, EXTENDED RELEASE ORAL DAILY
Qty: 90 TABLET | Refills: 0 | Status: SHIPPED | OUTPATIENT
Start: 2025-04-10

## 2025-04-10 NOTE — TELEPHONE ENCOUNTER
Received request via: Pharmacy    Was the patient seen in the last year in this department? Yes    Does the patient have an active prescription (recently filled or refills available) for medication(s) requested? No    Pharmacy Name: [facility] - [address] [facility] [address] [facility]    Does the patient have correction Plus and need 100-day supply? (This applies to ALL medications) Patient does not have SCP

## 2025-04-23 ENCOUNTER — OFFICE VISIT (OUTPATIENT)
Dept: INTERNAL MEDICINE | Facility: OTHER | Age: 89
End: 2025-04-23
Payer: MEDICARE

## 2025-04-23 VITALS
HEIGHT: 67 IN | SYSTOLIC BLOOD PRESSURE: 116 MMHG | TEMPERATURE: 98.3 F | DIASTOLIC BLOOD PRESSURE: 76 MMHG | HEART RATE: 72 BPM | BODY MASS INDEX: 37.92 KG/M2 | WEIGHT: 241.6 LBS | OXYGEN SATURATION: 94 %

## 2025-04-23 DIAGNOSIS — Z02.9 ADMINISTRATIVE ENCOUNTER: ICD-10-CM

## 2025-04-23 ASSESSMENT — FIBROSIS 4 INDEX: FIB4 SCORE: 1.46

## 2025-04-23 NOTE — LETTER
For whom it may concern,     This letter is being written at Mr. Sammy Yanez's requests for administrative purposes. He is a very well-known, pleasant, 89-year-old male patient at our clinic. He does not have any evidence of cognitive impairment and he is independent in all his iADL's and ADL's, and has a good support system through his daughter.     Please feel free to contact us if any questions may arise.         Mahendra Argueta MD.

## 2025-04-23 NOTE — PROGRESS NOTES
"    OFFICE VISIT    Sammy Yanez is a 89 y.o. male with PMH of Atrial Fibrillation on Apixaban, Hypothyroidism, Dyslipidemia, Hypertension, BPH. Presents today with the following:    Reason for visit:  Paperwork for [facility]. Patient is in the process of withdrawal from the current assistive program and  that he receives through the [facility] office. For this he needs a letter from us stating he does not have any degree of cognitive impairment. He is accompanied by his daughter who is his DPOA and main support system.     FOR FURTHER DETAILS REGARDING MANAGEMENT PLAN, PLEASE SEE BELOW.     Review of Systems   HENT:  Positive for hearing loss.    Eyes:  Negative for blurred vision and double vision.   Neurological:  Negative for dizziness, tingling, tremors, sensory change, speech change, focal weakness, seizures, loss of consciousness, weakness and headaches.   Psychiatric/Behavioral:  Negative for depression, memory loss, substance abuse and suicidal ideas. The patient is not nervous/anxious and does not have insomnia.        /76 (BP Location: Left arm, Patient Position: Sitting, BP Cuff Size: Adult)   Pulse 72   Temp 36.8 °C (98.3 °F) (Temporal)   Ht 1.702 m (5' 7\")   Wt 110 kg (241 lb 9.6 oz)   SpO2 94%   BMI 37.84 kg/m²     Physical Exam  Neurological:      Comments: Alert and oriented to person, time, and place  Follows commands  Speech is fluent  Pupils reactive to light and accomodation  Occular movements preserved  Facial symmetry preserved  Tongue is midline  Able to tolerate antigravity bilaterally, upper and lower extremities  No pronator drift  Sensation preserved throughout   Coordination preserved         Assessment and Plan:   89 y.o. male with PMH of Atrial Fibrillation on Apixaban, Hypothyroidism, Dyslipidemia, Hypertension, BPH. The following was addressed during this visit:     Administrative encounter  Needs paperwork stating no cognitive impairment is present for the [facility] office. "   ADL's: independent fro dressing, bathing, grooming, eating, toileting, and transferring.   iADL's: due to language barrier, her daughter helps with some of his finances and groceries but asides from this no limitations.   No personal or family h/o dementia or stroke.   No identifiable cognitive impairment.       No orders of the defined types were placed in this encounter.      No follow-ups on file.      Patient case was seen/ assessed/ discussed with Dr. Guzman.       Please note that this dictation was created using voice recognition software. I have made every reasonable attempt to correct obvious errors, but I expect that there are errors of grammar and possibly content that I did not discover before finalizing the note.       Signed by:    Mahendra Marc M.D.  PGY-3 Internal Medicine Resident

## 2025-04-24 PROBLEM — Z02.9 ADMINISTRATIVE ENCOUNTER: Status: ACTIVE | Noted: 2025-04-24

## 2025-04-24 ASSESSMENT — ENCOUNTER SYMPTOMS
INSOMNIA: 0
HEADACHES: 0
FOCAL WEAKNESS: 0
MEMORY LOSS: 0
DIZZINESS: 0
DEPRESSION: 0
LOSS OF CONSCIOUSNESS: 0
SEIZURES: 0
NERVOUS/ANXIOUS: 0
BLURRED VISION: 0
SENSORY CHANGE: 0
WEAKNESS: 0
SPEECH CHANGE: 0
DOUBLE VISION: 0
TREMORS: 0
TINGLING: 0

## 2025-04-24 ASSESSMENT — LIFESTYLE VARIABLES: SUBSTANCE_ABUSE: 0

## 2025-04-24 NOTE — ASSESSMENT & PLAN NOTE
Needs paperwork stating no cognitive impairment is present for the [facility] office.   ADL's: independent fro dressing, bathing, grooming, eating, toileting, and transferring.   iADL's: due to language barrier, her daughter helps with some of his finances and groceries but asides from this no limitations.   No personal or family h/o dementia or stroke.   No identifiable cognitive impairment.

## 2025-04-29 ENCOUNTER — HOSPITAL ENCOUNTER (OUTPATIENT)
Dept: RADIOLOGY | Facility: MEDICAL CENTER | Age: 89
End: 2025-04-29
Attending: INTERNAL MEDICINE
Payer: MEDICARE

## 2025-04-29 DIAGNOSIS — R29.818 ROMBERG TEST POSITIVE: ICD-10-CM

## 2025-04-29 DIAGNOSIS — R42 DIZZINESS: ICD-10-CM

## 2025-04-29 PROCEDURE — 70551 MRI BRAIN STEM W/O DYE: CPT

## 2025-04-30 ENCOUNTER — TELEPHONE (OUTPATIENT)
Dept: INTERNAL MEDICINE | Facility: OTHER | Age: 89
End: 2025-04-30
Payer: MEDICARE

## 2025-04-30 DIAGNOSIS — R63.5 WEIGHT GAIN: ICD-10-CM

## 2025-04-30 DIAGNOSIS — D64.9 ANEMIA, UNSPECIFIED TYPE: ICD-10-CM

## 2025-04-30 DIAGNOSIS — R73.01 ELEVATED FASTING GLUCOSE: ICD-10-CM

## 2025-04-30 DIAGNOSIS — I48.91 ATRIAL FIBRILLATION, UNSPECIFIED TYPE (HCC): ICD-10-CM

## 2025-04-30 DIAGNOSIS — I10 ESSENTIAL HYPERTENSION: Primary | ICD-10-CM

## 2025-04-30 DIAGNOSIS — E03.9 HYPOTHYROIDISM, UNSPECIFIED TYPE: ICD-10-CM

## 2025-04-30 DIAGNOSIS — Z13.228 ENCOUNTER FOR SCREENING FOR METABOLIC DISORDER: ICD-10-CM

## 2025-05-01 ENCOUNTER — TELEPHONE (OUTPATIENT)
Dept: CARDIOLOGY | Facility: MEDICAL CENTER | Age: 89
End: 2025-05-01
Payer: MEDICARE

## 2025-05-01 ENCOUNTER — RESULTS FOLLOW-UP (OUTPATIENT)
Dept: INTERNAL MEDICINE | Facility: OTHER | Age: 89
End: 2025-05-01

## 2025-05-01 NOTE — TELEPHONE ENCOUNTER
TW    Caller: Katharine - daughter     Topic/issue: Returning your call - daughter would like us to know that she has all unknown phone numbers blocked. So if she does not answer it is probably because she cannot see your call. Asked Katharine if it would be easier to communicate via PowerMessage and she states she prefers phone call. Please try to call her again.     Callback Number: [phone]    Thank you,  Alyssa BARRAGAN

## 2025-05-01 NOTE — TELEPHONE ENCOUNTER
Phone Number Called: [phone]- Katharine(daughter)    Call outcome:  Left message requesting daughter to call back    Message: Called to clarify request for Toprol XL medication. Prior refill shows it as two separate dosages 100mg and 50mg. Daughter requesting one dose. Prescription to go to [facility]  Awaiting phone call back    Walk in form scanned into media

## 2025-05-01 NOTE — TELEPHONE ENCOUNTER
Katharine Sammy's daughter stopped by the office to request a refill for her dad. She said her dad is going to run out of the 100mg metoprolol and they do not have anymore refill. Walk-in form given to Monica.

## 2025-05-02 RX ORDER — LISINOPRIL 10 MG/1
10 TABLET ORAL DAILY
Qty: 100 TABLET | Refills: 1 | Status: SHIPPED | OUTPATIENT
Start: 2025-05-02

## 2025-05-02 RX ORDER — METOPROLOL SUCCINATE 100 MG/1
100 TABLET, EXTENDED RELEASE ORAL
Qty: 90 TABLET | Refills: 1 | Status: SHIPPED | OUTPATIENT
Start: 2025-05-02

## 2025-05-05 NOTE — TELEPHONE ENCOUNTER
Phone Number Called: [phone]    Call outcome:Spoke with patient's daughterKatharine    Message: Called to discuss medications. Daughter states she had PCP refill but will discuss order further with TW when the patient has his follow up visit. No need for medication refills at the moment. No further questions or concerns.

## 2025-05-06 ENCOUNTER — HOSPITAL ENCOUNTER (OUTPATIENT)
Dept: LAB | Facility: MEDICAL CENTER | Age: 89
End: 2025-05-06
Attending: INTERNAL MEDICINE
Payer: MEDICARE

## 2025-05-06 DIAGNOSIS — R63.5 WEIGHT GAIN: ICD-10-CM

## 2025-05-06 DIAGNOSIS — D64.9 ANEMIA, UNSPECIFIED TYPE: ICD-10-CM

## 2025-05-06 DIAGNOSIS — I10 ESSENTIAL HYPERTENSION: ICD-10-CM

## 2025-05-06 DIAGNOSIS — E03.9 HYPOTHYROIDISM, UNSPECIFIED TYPE: ICD-10-CM

## 2025-05-06 DIAGNOSIS — R73.01 ELEVATED FASTING GLUCOSE: ICD-10-CM

## 2025-05-06 DIAGNOSIS — Z13.228 ENCOUNTER FOR SCREENING FOR METABOLIC DISORDER: ICD-10-CM

## 2025-05-06 LAB
BASOPHILS # BLD AUTO: 0.4 % (ref 0–1.8)
BASOPHILS # BLD: 0.03 K/UL (ref 0–0.12)
EOSINOPHIL # BLD AUTO: 0.19 K/UL (ref 0–0.51)
EOSINOPHIL NFR BLD: 2.3 % (ref 0–6.9)
ERYTHROCYTE [DISTWIDTH] IN BLOOD BY AUTOMATED COUNT: 55.7 FL (ref 35.9–50)
EST. AVERAGE GLUCOSE BLD GHB EST-MCNC: 186 MG/DL
HBA1C MFR BLD: 8.1 % (ref 4–5.6)
HCT VFR BLD AUTO: 44 % (ref 42–52)
HGB BLD-MCNC: 14 G/DL (ref 14–18)
HGB RETIC QN AUTO: 36.3 PG/CELL (ref 29–35)
IMM GRANULOCYTES # BLD AUTO: 0.05 K/UL (ref 0–0.11)
IMM GRANULOCYTES NFR BLD AUTO: 0.6 % (ref 0–0.9)
IMM RETICS NFR: 15.3 % (ref 2.6–16.1)
LYMPHOCYTES # BLD AUTO: 1.34 K/UL (ref 1–4.8)
LYMPHOCYTES NFR BLD: 16.4 % (ref 22–41)
MCH RBC QN AUTO: 31.7 PG (ref 27–33)
MCHC RBC AUTO-ENTMCNC: 31.8 G/DL (ref 32.3–36.5)
MCV RBC AUTO: 99.5 FL (ref 81.4–97.8)
MONOCYTES # BLD AUTO: 0.61 K/UL (ref 0–0.85)
MONOCYTES NFR BLD AUTO: 7.4 % (ref 0–13.4)
NEUTROPHILS # BLD AUTO: 5.97 K/UL (ref 1.82–7.42)
NEUTROPHILS NFR BLD: 72.9 % (ref 44–72)
NRBC # BLD AUTO: 0 K/UL
NRBC BLD-RTO: 0 /100 WBC (ref 0–0.2)
PLATELET # BLD AUTO: 236 K/UL (ref 164–446)
PMV BLD AUTO: 9 FL (ref 9–12.9)
RBC # BLD AUTO: 4.42 M/UL (ref 4.7–6.1)
RETICS # AUTO: 0.06 M/UL (ref 0.04–0.12)
RETICS/RBC NFR: 1.3 % (ref 0.8–2.6)
TSH SERPL DL<=0.005 MIU/L-ACNC: 5.68 UIU/ML (ref 0.38–5.33)
WBC # BLD AUTO: 8.2 K/UL (ref 4.8–10.8)

## 2025-05-06 PROCEDURE — 83036 HEMOGLOBIN GLYCOSYLATED A1C: CPT | Mod: GA

## 2025-05-06 PROCEDURE — 85046 RETICYTE/HGB CONCENTRATE: CPT

## 2025-05-06 PROCEDURE — 84443 ASSAY THYROID STIM HORMONE: CPT

## 2025-05-06 PROCEDURE — 85025 COMPLETE CBC W/AUTO DIFF WBC: CPT

## 2025-05-06 PROCEDURE — 84439 ASSAY OF FREE THYROXINE: CPT

## 2025-05-06 PROCEDURE — 86376 MICROSOMAL ANTIBODY EACH: CPT

## 2025-05-06 PROCEDURE — 80048 BASIC METABOLIC PNL TOTAL CA: CPT

## 2025-05-06 PROCEDURE — 36415 COLL VENOUS BLD VENIPUNCTURE: CPT

## 2025-05-06 PROCEDURE — 83010 ASSAY OF HAPTOGLOBIN QUANT: CPT

## 2025-05-07 ENCOUNTER — OFFICE VISIT (OUTPATIENT)
Dept: INTERNAL MEDICINE | Facility: OTHER | Age: 89
End: 2025-05-07
Payer: MEDICARE

## 2025-05-07 VITALS
DIASTOLIC BLOOD PRESSURE: 77 MMHG | HEART RATE: 66 BPM | TEMPERATURE: 98.5 F | WEIGHT: 235.8 LBS | BODY MASS INDEX: 37.01 KG/M2 | SYSTOLIC BLOOD PRESSURE: 120 MMHG | HEIGHT: 67 IN | OXYGEN SATURATION: 94 %

## 2025-05-07 DIAGNOSIS — E11.65 TYPE 2 DIABETES MELLITUS WITH HYPERGLYCEMIA, WITHOUT LONG-TERM CURRENT USE OF INSULIN (HCC): ICD-10-CM

## 2025-05-07 DIAGNOSIS — G60.9 IDIOPATHIC PERIPHERAL NEUROPATHY: ICD-10-CM

## 2025-05-07 DIAGNOSIS — E03.9 HYPOTHYROIDISM, UNSPECIFIED TYPE: ICD-10-CM

## 2025-05-07 LAB
ANION GAP SERPL CALC-SCNC: 11 MMOL/L (ref 7–16)
BUN SERPL-MCNC: 22 MG/DL (ref 8–22)
CALCIUM SERPL-MCNC: 9.5 MG/DL (ref 8.5–10.5)
CHLORIDE SERPL-SCNC: 105 MMOL/L (ref 96–112)
CO2 SERPL-SCNC: 24 MMOL/L (ref 20–33)
CREAT SERPL-MCNC: 1.03 MG/DL (ref 0.5–1.4)
GFR SERPLBLD CREATININE-BSD FMLA CKD-EPI: 70 ML/MIN/1.73 M 2
GLUCOSE SERPL-MCNC: 130 MG/DL (ref 65–99)
POTASSIUM SERPL-SCNC: 4.9 MMOL/L (ref 3.6–5.5)
SODIUM SERPL-SCNC: 140 MMOL/L (ref 135–145)
T4 FREE SERPL-MCNC: 0.84 NG/DL (ref 0.93–1.7)
THYROPEROXIDASE AB SERPL-ACNC: <9 IU/ML (ref 0–9)

## 2025-05-07 PROCEDURE — 3078F DIAST BP <80 MM HG: CPT | Performed by: INTERNAL MEDICINE

## 2025-05-07 PROCEDURE — 3074F SYST BP LT 130 MM HG: CPT | Performed by: INTERNAL MEDICINE

## 2025-05-07 PROCEDURE — 99214 OFFICE O/P EST MOD 30 MIN: CPT | Performed by: INTERNAL MEDICINE

## 2025-05-07 RX ORDER — LEVOTHYROXINE SODIUM 25 UG/1
25 TABLET ORAL
Qty: 32 TABLET | Refills: 1 | Status: SHIPPED | OUTPATIENT
Start: 2025-05-07

## 2025-05-07 ASSESSMENT — FIBROSIS 4 INDEX: FIB4 SCORE: 1.54

## 2025-05-07 NOTE — PROGRESS NOTES
Chief Complaint   Patient presents with    Follow-Up     General follow up    Lab Results     Review lab results       HPI: Sammy Yanez is a 88 y.o. male with past medical history as below  who presented to the clinic for the following.    Here to discuss blood work and the following .    *Type 2 diabetes mellitus.  - New diagnosis, A1c of 8.1, notably from November 2024 the fasting blood sugar was in 130s, blood test prior to that in April 2024 with normal fasting blood sugar.  - Patient did report some weight gain over the past few months.  However since last visit has been able to lose weight.  - Has been tackling with abnormal thyroid function study.  - Patient open to trying metformin, is in the habit of eating a lot of sweets, recommended to not take metformin with sweets.  - Recommended to take metformin with food does not sweets.    - For the first 1 week take metformin 500 once daily, increase to 500 twice daily over the next week.  - Microalbumin creatinine ratio has been ordered  - Monofilament test abnormal on bilateral feet.  - Nail discoloration, open to podiatry referral.  - Vibration test abnormal up to mid leg on right.  Up to a slightly distal to the knee on the left  -Did not discuss ophthalmology follow-up on this visit.    *Peripheral neuropathy.  - Patient had notable neuropathy with abnormal monofilament vibration sense on examination, Romberg positive on previous exam, no cerebellar pathology on MRI brain.  - Lives in an old home, do not suspect lead exposure but is a possibility.  - Discussed with patient that the diabetes diagnosis is new onset and should not explain this neuropathy as he was having normal sugars last year and usually longstanding diabetes as well as causes neuropathy.  - Offered workup for neuropathy including EMG however they would like to defer after discussion  - Daughter wanted to know if there is any medication for neuropathy, patient informed that we  could control positive symptoms however if it is not bothersome then we should focus on what could be potentially causing it and also control sugars to prevent it from getting worse     *Hypothyroidism   On 12.5mcg of levothyroxine   TSH > 5.580 > 4.11 after starting 12.5mcg > 3.630 at 6 week repeat without dose change .  However recent repeat showing high TSH of 5.660 with low free T4- twice . Patient confirms that he takes the thyroid medication first and takes food more than half an hour following the thyroid medication, does not combine it with any other medications or food.  Has been taking it regularly.  -Symptom wise patient does report chronic constipation however even prior to thyroid dysfunction.  Does report low energy fatigue, and weight gain.  Does however have decreased level of activity over the past months which may also be contributing to the weight gain.  Tries to eat healthy however does indulge in fatty foods/noodles  Dose increased from 12.5 mcg to 25 mcg on last visit however repeat is still showing hypothyroidism, increasing to 1 extra tablet of 25 once a week.  Repeat thyroid function study plan in 3 months      *Depression.  -On the previous visit daughter raised concerns about worsening mood, Celexa dose was increased from 10 mg to 20 mg.  -Daughter has noticed a difference in his mood with this more being more elevated than prior.  -Thyroid dysfunction may be contributing management of the same as above  - No changes in citalopram dose on the last visit, patient continues to improve in this aspect.  Going out for walks has helped as per daughter                                   Other problems not discussed on this visit include   Dizziness   -Details in previous note could be multifactorial.  - Partially possibly due to sensory neuropathy as mentioned above.  - Details of this problem mention on the last visit.  *Paroxysmal atrial fibrillation.  -Patient is currently on metoprolol-50 mg  in the morning and 100 mg at nighttime, recently established  Dr. Romo who initiated Eliquis.started on eliquis , had discussion about watchman device which has been deferred at this time    Next appointment with cardiology in 6 months.  -Educated patient regarding bleeding risk associated with Eliquis.   *Cough   -Associated with phlegm in the back of his throat, nasal congestion  -claritin ineffective, allegra with flonase helping  - effects last for more than a day- .  Using mucinex as needed   Does still get it which causes - chest discomfort   Previously was prescribed albuterol - patient does not like to use it - he is not sure if he would like nebuliser but willing to try  - we will try to arrange that    Denies any orthopnea, PND.   denies any wheezing.  Patient has been on spiriva and albuterol in the past  No pft on file    -On examination there is no exudate appreciated, lungs are symmetrical breath sounds, no added sounds.  --Also discussed about increasing weight potentially causing difficulty breathing. Adjusting thyroid dose today   Does have history of sleep apnea - not using CPAP - boswell snot like to use it - does not have machine and does not want it   -Patient does not appear volume overloaded on examination today.  -Together with patient and daughter decided to work on nutrition, weight loss as well on last visit - he has been walking more in the house   Anemia-   -Patient denies any bleeding in stools, melena, bleeding elsewhere.  -Patient denies any hematuria at this time   -iron labs normal, b12, folate normal   *Macrocytosis- stable < 100,   LDH normal   Stable macrocytic anemia   TSH abnormal - changing dose   No alcohol use   Peripheral Blood smear not abnormal  Reticulocyte count showing inadequate red blood cell production.  - CBC showing stability on repeat, will plan to repeat every 6 months.  *Fatigue  -Initially described as dizziness by daughter, later clarified is just feeling  tired wanting to sleep more  -Recently patient has been feeling easily fatigued, more sleepy.  Confirmed that there was no confusion, weakness speech abnormality.    -Since last visit we did increase the dose of Celexa to 20 mg with notable elevation of mood as per daughter.  -However energy level/excessive sleep still persist.  -Patient is not active as prior.  Has gained weight, is not following a strict diet, tends to indulge in fatty foods/noodles  -Thyroid function abnormalities as detailed above- adjusting today   -Recent CBC still showing anemia, not iron deficiency, has macrocytosis - but stable , PBS normal - will repeat in 3 months   -Macrocytosis that is chronic requiring routine follow-up.  -Neuroexam positive rhomberg  -Has history of obstructive sleep apnea, not using CPAP as unable to tolerate.  *Obstructive sleep apnea.  -Patient is currently not using CPAP as he was not able to tolerate the machine.   *Low vitamin B6 - resolved   No symptoms of malabsorption - alcohol use not discussed  Does report some falls, no neuropathy reported  Repeat - high - supplement stopped    B12 normal   B1 normal    *Hematuria   -Resolved  -History of RCC status post ablation   Last UA showing WBC and RBCs   -Renal function shows improvement - BUN still slightly elevated, patient is staying well hydrated   Patient also had a CT scan through urology that did not show any new mass.   -Patient has since had cystoscopy that reviewed enlarged prostate as per patient's daughter recommended TURP however daughter is reluctant to have patient under anesthesia for the procedure, besides hematuria has resolved.  Patient was also started on finasteride   -Discussed possibility of putting off procedure if not bothersome, and also having a discussion with urology to see if that is appropriate       *Fall history.- none recently  -Patient reported that he has very bad bilateral knees, and hip pain along with low back pain.  -On  examination patient did have tenderness to palpation in the lumbar midline, patient did have x-ray done after the fall which did not show any fractures but did show degenerative changes including sacroiliitis.  -On hip examination patient did  have painful internal and external range of motion of the left hip in the anterior area, there was pain reported on the left hip however examination is normal today.  -Patient did have symmetrical strength on bilateral lower extremities, sensation not impacted.  -Straight leg test was negative.  -physical therapy referral was placed -she has not pursued physical therapy with significant benefit, no falls since, patient is able to get down from the examination table without much difficulty compared to before, required slight assistance to climb up on the examination table.  depression, cognitive decline  Patient is Nepali only speaking, is accompanied by her daughter today.  Mood symptoms   -more depressed than normal since spouse;s death in September after 62 years of marriage   Patient had been on mirtazapine even prior to spouse's death.  -Patient has been found talking to the urn, crying a lot.  Sleeping a lot.  Rarely goes outside anymore.  -Normally he expresses his frustration regarding general matters to his wife however now he is currently living with his son and this has been difficult for him as well.  - sometimes sees things like shadows and hears basals and other sounds which are not present.  He is also is reported to have seen a light coming from the urn, and sometimes wishes that he could join his wife.   -No intent for suicide, daughter confirms that that is not in favor of his believes at all.   -Of note patient was briefly discontinued off mirtazapine due to the possibility of it being a reactive depression secondary to wife's passing however he has had to go back on it as per daughter.  When it was first started patient was also having weight loss and  decreased appetite, however his weight has been trending up based on most recent trends, BMI of 34.93 at this time.  -Started on citalopram on last visit and daughter reports that she has noticed improvement and that he is not sleeping a lot like before   Repeat PHQ in Malian on file     *Essential hypertension   -Well-controlled on this visit, currently on metoprolol 150, lisinopril 5 mg.   *Dyslipidemia-patient is on 40 mg atorvastatin.  Social history, family history not discussed on this visit.pending reestablishing visit in 6 weeks   Orthostatic dizziness   Preventative health.  -Vaccinations not available in our clinic today.  Due to power outage.  Pending pneumococcal shot, zoster and Tdap-encouraged to get them at pharmacy  -Colonoscopy aged out.  -Former smoker as per chart, AAA screening to be discussed on the next visit.  -No family history of prostate cancer recorded, is following up with urology as well.  Does have history of renal cell carcinoma.  -Is a fall risk however significantly reduced since initiating physical therapy.               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 Start with one tablet a day for first week 60 Tablet 1    levothyroxine (SYNTHROID) 25 MCG Tab Take 1 Tablet by mouth every morning on an empty stomach. Except one day of the week when he should take two tablets instead of one ( total 50mcg) 32 Tablet 1    metoprolol SR (TOPROL XL) 100 MG TABLET SR 24 HR Take 1 Tablet by mouth at bedtime. 90 Tablet 1    lisinopril (PRINIVIL) 10 MG Tab Take 1 Tablet by mouth every day. 100 Tablet 1    guaiFENesin ER (MUCINEX) 600 MG TABLET SR 12 HR TAKE 1 TABLET BY MOUTH EVERY 12 HOURS AS NEEDED FOR COUGH 60 Tablet 1    metoprolol SR (TOPROL XL) 50 MG TABLET SR 24 HR Take 1 Tablet by mouth every day. 90 Tablet 0    atorvastatin (LIPITOR) 40 MG Tab Take 1 Tablet by mouth every day. Take 1 tablet by mouth every night at bedtime 90 Tablet 1    albuterol (PROVENTIL) 2.5mg/3ml Nebu Soln solution for nebulization Take 3 mL by nebulization every four hours as needed for Shortness of Breath. Use with nebuliser machine that should be delivered separately through [employer] company - contact PCP office if you dont hear drom [employer] company 1 Each 1    citalopram (CELEXA) 20 MG Tab TAKE 1 TABLET BY MOUTH EVERY DAY 90 Tablet 1    apixaban (ELIQUIS) 5mg Tab Take 1 Tablet by mouth 2 times a day. 180 Tablet 3    finasteride (PROSCAR) 5 MG Tab Take 1 Tablet by mouth every day.       No current facility-administered medications for this visit.       ROS: As per HPI. Additional pertinent systems as noted below.  Constitutional:  Negative for fever, chills   HEENt : postive as in hpi   Cardiovascular:  positive as in hpi   Respiratory:  positive as in hpi   Neuro : positive as in hpi       /77 (BP Location: Left arm, Patient Position: Sitting, BP Cuff Size: Adult)   Pulse 66   Temp 36.9 °C (98.5 °F) (Temporal)   Ht 1.702 m (5' 7\")   Wt 107 " kg (235 lb 12.8 oz)   SpO2 94%   BMI 36.93 kg/m²     Physical Exam   Constitutional:   Not in acute distress   CVS /RESPI: normal S1 and s2 no added sounded, no murmurs, symmetrical breath sounds.   Neuro exam ; strength normal , cranial nerves normal, gait slow ,no tremors, rhomberg positive        Note: I have reviewed all pertinent labs and diagnostic tests associated with this visit with specific comments listed under the assessment and plan below    Assessment and Plan    1. Type 2 diabetes mellitus with hyperglycemia, without long-term current use of insulin ([facility])  - New diagnosis, A1c of 8.1, notably from November 2024 the fasting blood sugar was in 130s, blood test prior to that in April 2024 with normal fasting blood sugar.  - Patient did report some weight gain over the past few months.  However since last visit has been able to lose weight.  - Has been tackling with abnormal thyroid function study.  - Patient open to trying metformin, is in the habit of eating a lot of sweets, recommended to not take metformin with sweets.  - Recommended to take metformin with food does not sweets.    - For the first 1 week take metformin 500 once daily, increase to 500 twice daily over the next week.  - Microalbumin creatinine ratio has been ordered  - Monofilament test abnormal on bilateral feet.  - Nail discoloration, open to podiatry referral.  - Vibration test abnormal up to mid leg on right.  Up to a slightly distal to the knee on the left  -Did not discuss ophthalmology follow-up on this visit.    - metFORMIN (GLUCOPHAGE) 500 MG Tab; Take 1 Tablet by mouth 2 times a day with meals. Start with one tablet a day for first week  Dispense: 60 Tablet; Refill: 1  - HEMOGLOBIN A1C; Future  - MICROALBUMIN CREAT RATIO URINE; Future  - Referral to Podiatry    2. Hypothyroidism, unspecified type  Dose increased from 12.5 mcg to 25 mcg on last visit however repeat is still showing hypothyroidism, increasing to 1 extra tablet  of 25 once a week.  Repeat thyroid function study plan in 3 months  - TSH WITH REFLEX TO FT4; Future  - levothyroxine (SYNTHROID) 25 MCG Tab; Take 1 Tablet by mouth every morning on an empty stomach. Except one day of the week when he should take two tablets instead of one ( total 50mcg)  Dispense: 32 Tablet; Refill: 1    3. Idiopathic peripheral neuropathy  - Patient had notable neuropathy with abnormal monofilament vibration sense on examination, Romberg positive on previous exam, no cerebellar pathology on MRI brain.  - Lives in an old home, do not suspect lead exposure but is a possibility.  - Discussed with patient that the diabetes diagnosis is new onset and should not explain this neuropathy as he was having normal sugars last year and usually longstanding diabetes as well as causes neuropathy.  - Offered workup for neuropathy including EMG however they would like to defer after discussion  - Daughter wanted to know if there is any medication for neuropathy, patient informed that we could control positive symptoms however if it is not bothersome then we should focus on what could be potentially causing it and also control sugars to prevent it from getting worse   - Referral to Podiatry      Followup: Return in about 3 months (around 8/7/2025).        Signed by: Jj Molina M.D.

## 2025-05-07 NOTE — TELEPHONE ENCOUNTER
Pharmacy is requesting a 90 day rx of Metformin     To pharmacy: **Patient requests 90 days supply**

## 2025-05-08 LAB — HAPTOGLOB SERPL-MCNC: 280 MG/DL (ref 30–200)

## 2025-05-14 NOTE — Clinical Note
REFERRAL APPROVAL NOTICE         Sent on May 14, 2025                   Sammy Yanez  [address]   [address]  [place] [address]                   Dear Mr. Yanez,    After a careful review of the medical information and benefit coverage, [facility] has processed your referral. See below for additional details.    If applicable, you must be actively enrolled with your insurance for coverage of the authorized service. If you have any questions regarding your coverage, please contact your insurance directly.    REFERRAL INFORMATION   Referral #:  [id number]  Referred-To Provider    Referred-By Provider:  Podiatry    Jj Molina M.D.   [facility]      [address]  [place] [address]  [phone] [address] [facility] [address]  [phone]    Referral Start Date:  05/07/2025  Referral End Date:   05/07/2026             SCHEDULING  If you do not already have an appointment, please call [phone] to make an appointment.     MORE INFORMATION  If you do not already have a [employer] account, sign up at: [email or web address]  You can access your medical information, make appointments, see lab results, billing information, and more.  If you have questions regarding this referral, please contact  the [facility] Referrals department at:             [phone]. Monday - Friday 8:00AM - 5:00PM.     Sincerely,    [facility]

## 2025-05-23 DIAGNOSIS — F32.A DEPRESSION, UNSPECIFIED DEPRESSION TYPE: ICD-10-CM

## 2025-05-23 RX ORDER — CITALOPRAM HYDROBROMIDE 20 MG/1
20 TABLET ORAL DAILY
Qty: 90 TABLET | Refills: 1 | Status: SHIPPED | OUTPATIENT
Start: 2025-05-23

## 2025-05-23 NOTE — TELEPHONE ENCOUNTER
Received request via: Pharmacy    Was the patient seen in the last year in this department? Yes    Does the patient have an active prescription (recently filled or refills available) for medication(s) requested? No    Pharmacy Name: [facility]    Does the patient have longterm Plus and need 100-day supply? (This applies to ALL medications) Patient does not have SCP

## 2025-05-27 ENCOUNTER — APPOINTMENT (OUTPATIENT)
Dept: CARDIOLOGY | Facility: MEDICAL CENTER | Age: 89
End: 2025-05-27
Attending: INTERNAL MEDICINE
Payer: MEDICARE

## 2025-05-27 ENCOUNTER — TELEPHONE (OUTPATIENT)
Dept: CARDIOLOGY | Facility: MEDICAL CENTER | Age: 89
End: 2025-05-27
Payer: MEDICARE

## 2025-06-10 ENCOUNTER — OFFICE VISIT (OUTPATIENT)
Dept: CARDIOLOGY | Facility: MEDICAL CENTER | Age: 89
End: 2025-06-10
Attending: INTERNAL MEDICINE
Payer: MEDICARE

## 2025-06-10 VITALS
DIASTOLIC BLOOD PRESSURE: 68 MMHG | HEART RATE: 71 BPM | WEIGHT: 230 LBS | RESPIRATION RATE: 16 BRPM | SYSTOLIC BLOOD PRESSURE: 112 MMHG | HEIGHT: 67 IN | BODY MASS INDEX: 36.1 KG/M2 | OXYGEN SATURATION: 92 %

## 2025-06-10 DIAGNOSIS — I10 ESSENTIAL HYPERTENSION: Primary | ICD-10-CM

## 2025-06-10 DIAGNOSIS — I71.21 ANEURYSM OF ASCENDING AORTA WITHOUT RUPTURE (HCC): ICD-10-CM

## 2025-06-10 DIAGNOSIS — I48.0 PAF (PAROXYSMAL ATRIAL FIBRILLATION) (HCC): ICD-10-CM

## 2025-06-10 DIAGNOSIS — Z79.01 ON CONTINUOUS ORAL ANTICOAGULATION: ICD-10-CM

## 2025-06-10 PROCEDURE — 99215 OFFICE O/P EST HI 40 MIN: CPT | Performed by: INTERNAL MEDICINE

## 2025-06-10 PROCEDURE — 3078F DIAST BP <80 MM HG: CPT | Performed by: INTERNAL MEDICINE

## 2025-06-10 PROCEDURE — 99212 OFFICE O/P EST SF 10 MIN: CPT | Performed by: INTERNAL MEDICINE

## 2025-06-10 PROCEDURE — 3074F SYST BP LT 130 MM HG: CPT | Performed by: INTERNAL MEDICINE

## 2025-06-10 PROCEDURE — 99213 OFFICE O/P EST LOW 20 MIN: CPT | Performed by: INTERNAL MEDICINE

## 2025-06-10 ASSESSMENT — FIBROSIS 4 INDEX: FIB4 SCORE: 1.54

## 2025-06-10 NOTE — PROGRESS NOTES
"Chief Complaint   Patient presents with    Atrial Fibrillation     F/v Dx:PAF (paroxysmal atrial fibrillation) ([facility])       Subjective     Sammy Yanez is a 89 y.o. male who presents today for our initial follow-up for paroxysmal atrial fibrillation, thoracic aortic aneurysm deemed a nonsurgical candidate by Dr. Tuttle and the patient and recent assessments, nonischemic cardiomyopathy recovered EF previously hypertension and hyperlipidemia.      Since last visit he has no complaints.  He is not interested in surgical interventions.  He is tolerating his new oral anticoagulation without difficulty and has no symptomatic A-fib.    Past Medical History:   Diagnosis Date    Arrhythmia     Arthritis 09/07/2021    Lumbar area and shoulders.    Cataract     Bilateral IOL's.    COPD (chronic obstructive pulmonary disease) ([facility])     9/7/2021 - states pt not diagnosed with COPD, pulmonary referrel pending, uses inhaler daily at this time.    Dental disorder     Several teeth missing/pulled.    High cholesterol 09/07/2021    No medication at this time, daughter will follow up with PCP.    History of UTI     Hypertension     Psychiatric problem 09/07/2021    Bipolar - no medication.    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     Past Surgical History:   Procedure Laterality Date    OTHER  2004    \"Prostate cleaned out\"    APPENDECTOMY      Around age 35-41.    CHOLECYSTECTOMY      Around age 35-41.     History reviewed. No pertinent family history.  Social History     Socioeconomic History    Marital status:      Spouse name: Not on file    Number of children: Not on file    Years of education: Not on file    Highest education level: Not on file   Occupational History    Not on file   Tobacco Use    Smoking status: Former     Current packs/day: 0.25     Average packs/day: 0.3 packs/day for 20.0 years (5.0 ttl pk-yrs)     Types: Cigarettes    Smokeless tobacco: Never    Tobacco comments:     " Quit around age 41.   Vaping Use    Vaping status: Never Used   Substance and Sexual Activity    Alcohol use: Not Currently    Drug use: Never    Sexual activity: Not on file   Other Topics Concern    Not on file   Social History Narrative    Not on file     Social Drivers of Health     Financial Resource Strain: Not on file   Food Insecurity: Not on file   Transportation Needs: Not on file   Physical Activity: Not on file   Stress: Not on file   Social Connections: Not on file   Intimate Partner Violence: Not on file   Housing Stability: Not on file     No Known Allergies  Outpatient Encounter Medications as of 6/10/2025   Medication Sig Dispense Refill    citalopram (CELEXA) 20 MG Tab TAKE 1 TABLET BY MOUTH EVERY DAY 90 Tablet 1    metFORMIN (GLUCOPHAGE) 500 MG Tab TAKE 1 TABLET BY MOUTH TWICE DAILY WITH MEALS. START WITH 1 TABLET A DAY FOR FIRST WEEK 180 Tablet 1    levothyroxine (SYNTHROID) 25 MCG Tab Take 1 Tablet by mouth every morning on an empty stomach. Except one day of the week when he should take two tablets instead of one ( total 50mcg) 32 Tablet 1    metoprolol SR (TOPROL XL) 100 MG TABLET SR 24 HR Take 1 Tablet by mouth at bedtime. 90 Tablet 1    lisinopril (PRINIVIL) 10 MG Tab Take 1 Tablet by mouth every day. 100 Tablet 1    guaiFENesin ER (MUCINEX) 600 MG TABLET SR 12 HR TAKE 1 TABLET BY MOUTH EVERY 12 HOURS AS NEEDED FOR COUGH 60 Tablet 1    metoprolol SR (TOPROL XL) 50 MG TABLET SR 24 HR Take 1 Tablet by mouth every day. 90 Tablet 0    atorvastatin (LIPITOR) 40 MG Tab Take 1 Tablet by mouth every day. Take 1 tablet by mouth every night at bedtime 90 Tablet 1    albuterol (PROVENTIL) 2.5mg/3ml Nebu Soln solution for nebulization Take 3 mL by nebulization every four hours as needed for Shortness of Breath. Use with nebuliser machine that should be delivered separately through [employer] company - contact PCP office if you dont hear drom DME company 1 Each 1    apixaban (ELIQUIS) 5mg Tab Take 1 Tablet by  "mouth 2 times a day. 180 Tablet 3    finasteride (PROSCAR) 5 MG Tab Take 1 Tablet by mouth every day.       No facility-administered encounter medications on file as of 6/10/2025.     Review of Systems   All other systems reviewed and are negative.             Objective     /68 (BP Location: Left arm, Patient Position: Sitting, BP Cuff Size: Adult)   Pulse 71   Resp 16   Ht 1.702 m (5' 7\")   Wt 104 kg (230 lb)   SpO2 92%   BMI 36.02 kg/m²     Physical Exam  Vitals and nursing note reviewed.   Constitutional:       General: He is not in acute distress.     Appearance: Normal appearance.   HENT:      Head: Normocephalic and atraumatic.      Right Ear: External ear normal.      Left Ear: External ear normal.      Nose: Nose normal.   Eyes:      Conjunctiva/sclera: Conjunctivae normal.   Cardiovascular:      Rate and Rhythm: Normal rate and regular rhythm.      Pulses: Normal pulses.      Heart sounds: No murmur heard.  Pulmonary:      Effort: Pulmonary effort is normal. No respiratory distress.      Breath sounds: Normal breath sounds.   Abdominal:      General: There is no distension.      Palpations: Abdomen is soft.   Musculoskeletal:      Cervical back: No rigidity or tenderness.      Right lower leg: No edema.      Left lower leg: No edema.   Skin:     General: Skin is warm and dry.      Capillary Refill: Capillary refill takes 2 to 3 seconds.   Neurological:      General: No focal deficit present.      Mental Status: He is alert and oriented to person, place, and time.   Psychiatric:         Mood and Affect: Mood normal.         Behavior: Behavior normal.         Thought Content: Thought content normal.       LABS:  Lab Results   Component Value Date/Time    CHOLSTRLTOT 140 04/16/2024 12:57 PM    LDL 61 04/16/2024 12:57 PM    HDL 59 04/16/2024 12:57 PM    TRIGLYCERIDE 98 04/16/2024 12:57 PM       Lab Results   Component Value Date/Time    WBC 8.2 05/06/2025 01:29 PM    RBC 4.42 (L) 05/06/2025 01:29 PM " "   HEMOGLOBIN 14.0 05/06/2025 01:29 PM    HEMATOCRIT 44.0 05/06/2025 01:29 PM    MCV 99.5 (H) 05/06/2025 01:29 PM    NEUTSPOLYS 72.90 (H) 05/06/2025 01:29 PM    LYMPHOCYTES 16.40 (L) 05/06/2025 01:29 PM    MONOCYTES 7.40 05/06/2025 01:29 PM    EOSINOPHILS 2.30 05/06/2025 01:29 PM    BASOPHILS 0.40 05/06/2025 01:29 PM     Lab Results   Component Value Date/Time    SODIUM 140 05/06/2025 01:29 PM    POTASSIUM 4.9 05/06/2025 01:29 PM    CHLORIDE 105 05/06/2025 01:29 PM    CO2 24 05/06/2025 01:29 PM    GLUCOSE 130 (H) 05/06/2025 01:29 PM    BUN 22 05/06/2025 01:29 PM    CREATININE 1.03 05/06/2025 01:29 PM    CREATININE 0.8 11/07/2005 05:25 AM     Lab Results   Component Value Date    HBA1C 8.1 (H) 05/06/2025      Lab Results   Component Value Date/Time    ALKPHOSPHAT 52 04/16/2024 12:57 PM    ASTSGOT 16 04/16/2024 12:57 PM    ALTSGPT 15 04/16/2024 12:57 PM    TBILIRUBIN 0.4 04/16/2024 12:57 PM      No results found for: \"BNPBTYPENAT\"   No results found for: \"TSH\"  Lab Results   Component Value Date/Time    PROTHROMBTM 13.5 09/20/2021 07:40 AM    INR 1.06 09/20/2021 07:40 AM      Imaging reviewed           Assessment & Plan     1. Essential hypertension        2. PAF (paroxysmal atrial fibrillation) ([facility])        3. On continuous oral anticoagulation  Basic Metabolic Panel      4. Aneurysm of ascending aorta without rupture ([facility])            Medical Decision Making: Today's Assessment/Status/Plan:          Feeling well recovered EF not interested in procedures or surgical intervention or surveillance of his aorta.  Tolerating oral anticoagulation for stroke prevention which is high risk medication being monitored laboratory studies ordered today made high risk by his age and frailty.  Continue current medical therapy follow-up routinely.    Mr. Yanez's care is highly complex due to high risk diagnosis with either severe exacerbation, progression, or side effects of treatment and is at high risk for complication, " morbidity, and mortality. We specifically discussed the need for high risk medication requiring at least quarterly testing and/or made a decision on elective/emergent major surgery with identified patient or procedure risk factors specific to Mr. Yanez. I have personally spent extra time in discussion about these facts with Mr. Yanez and reviewed and or ordered at least 3 tests, documents or other physician/[facility] reports available including labs, imaging and EKGs as appropriate separate from today's encounter.  When relevant, I have reviewed images with Mr. Yanez and personally interpreted on this encounter day the referenced EKG, echocardiogram, stress tests, CT scan, cardiac catheterization or other cardiac imaging and my personal interpretation is what is specifically stated in this note.

## 2025-07-16 DIAGNOSIS — I10 HYPERTENSION, UNSPECIFIED TYPE: ICD-10-CM

## 2025-07-16 DIAGNOSIS — R00.2 PALPITATIONS: ICD-10-CM

## 2025-07-16 RX ORDER — METOPROLOL SUCCINATE 50 MG/1
50 TABLET, EXTENDED RELEASE ORAL DAILY
Qty: 90 TABLET | Refills: 3 | Status: ON HOLD | OUTPATIENT
Start: 2025-07-16

## 2025-07-16 NOTE — TELEPHONE ENCOUNTER
Is the patient due for a refill? Yes    Was the patient seen the last 15 months? Yes    Date of last office visit: 6/10/2025    Does the patient have an upcoming appointment?  Yes   If yes, When? 11/17/2025    Provider to refill:TW    Does the patient have [facility] and need 100-day supply? (This applies to ALL medications) Patient does not have SCP

## 2025-07-23 ENCOUNTER — TELEPHONE (OUTPATIENT)
Dept: INTERNAL MEDICINE | Facility: OTHER | Age: 89
End: 2025-07-23
Payer: MEDICARE

## 2025-07-23 DIAGNOSIS — Z91.81 AT HIGH RISK FOR FALLS: Primary | ICD-10-CM

## 2025-07-23 NOTE — TELEPHONE ENCOUNTER
Patients daughter came into the clinic stating she needs a prescription for a walker. She went to [facility] and they are needing this before they can assist with this. She states he has a very hard time walking and recently fell and had to call firefighters to help him. She is unsure how he will be able to come into the appointment as he is very unstable and wondering if this is able to be placed without an appointment

## 2025-07-25 NOTE — TELEPHONE ENCOUNTER
Called patient's daughter.  - Sammy had a fall on Friday prior to which he went to the [facility] and was very happy.  - They had to call 911 as they were unable to get him off the floor after 45 minutes oftrying.  - His EKG was told to be normal, his heart rate was normal however his blood pressure was low and he was recommended to go to the ER to have that checked out however patient declined.  He had capacity to make decisions at the time hence 911 could not do anything past that.  - However since then he has declined significantly where he is not moving he is bedridden he does not feel like doing it, is not eating had 1 egg yesterday.  - He does not engage in much of a conversation.  - Denies any head injury no focal weakness.  - Also lately he has been undressing himself and walking around with only a shirt on which is very unlike him.  - Katharine, from her experience with her late  with alzheimer's and mom with dementia who before passing presented similarly 0  tried to contact hospice and has an appointment with them today.  - Where they will determine if he is a hospice candidate after reviewing chart.  They do not have power of  established yet.  - There is a living will in place - recommended to take to hospice appt   Patient at this time declines wanting to go to hospital and is not engaging in a conversation but was deemed to have capacity during 911 evaluation    FOR GARCIA   please fax order for wheelchair to care chest urgently .   Please also mail copy to patient and send patient [employer] message also please

## 2025-07-25 NOTE — TELEPHONE ENCOUNTER
Per Dr. Molina, orders were faxed to [facility], printed and mailed to [facility]. Pt was sent the DME Order only per [employer] message.

## 2025-07-28 ENCOUNTER — HOSPITAL ENCOUNTER (INPATIENT)
Facility: MEDICAL CENTER | Age: 89
End: 2025-07-28
Attending: STUDENT IN AN ORGANIZED HEALTH CARE EDUCATION/TRAINING PROGRAM | Admitting: STUDENT IN AN ORGANIZED HEALTH CARE EDUCATION/TRAINING PROGRAM
Payer: MEDICARE

## 2025-07-28 ENCOUNTER — PATIENT MESSAGE (OUTPATIENT)
Dept: INTERNAL MEDICINE | Facility: OTHER | Age: 89
End: 2025-07-28

## 2025-07-28 ENCOUNTER — APPOINTMENT (OUTPATIENT)
Dept: RADIOLOGY | Facility: MEDICAL CENTER | Age: 89
DRG: 177 | End: 2025-07-28
Attending: STUDENT IN AN ORGANIZED HEALTH CARE EDUCATION/TRAINING PROGRAM
Payer: MEDICARE

## 2025-07-28 ENCOUNTER — TELEPHONE (OUTPATIENT)
Dept: INTERNAL MEDICINE | Facility: OTHER | Age: 89
End: 2025-07-28

## 2025-07-28 DIAGNOSIS — I95.9 HYPOTENSION, UNSPECIFIED HYPOTENSION TYPE: ICD-10-CM

## 2025-07-28 DIAGNOSIS — R31.0 GROSS HEMATURIA: ICD-10-CM

## 2025-07-28 DIAGNOSIS — Z91.81 AT HIGH RISK FOR FALLS: ICD-10-CM

## 2025-07-28 DIAGNOSIS — R42 DYSEQUILIBRIUM: ICD-10-CM

## 2025-07-28 DIAGNOSIS — R41.82 ALTERED MENTAL STATUS, UNSPECIFIED ALTERED MENTAL STATUS TYPE: Primary | ICD-10-CM

## 2025-07-28 DIAGNOSIS — R32 URINARY INCONTINENCE, UNSPECIFIED TYPE: ICD-10-CM

## 2025-07-28 DIAGNOSIS — R41.89 COGNITIVE CHANGE: ICD-10-CM

## 2025-07-28 DIAGNOSIS — I48.91 ATRIAL FIBRILLATION, UNSPECIFIED TYPE (HCC): ICD-10-CM

## 2025-07-28 DIAGNOSIS — R44.3 HALLUCINATION: ICD-10-CM

## 2025-07-28 DIAGNOSIS — E11.65 TYPE 2 DIABETES MELLITUS WITH HYPERGLYCEMIA, WITHOUT LONG-TERM CURRENT USE OF INSULIN (HCC): ICD-10-CM

## 2025-07-28 PROBLEM — G93.40 ACUTE ENCEPHALOPATHY: Status: ACTIVE | Noted: 2025-07-28

## 2025-07-28 PROBLEM — Z71.89 ADVANCED CARE PLANNING/COUNSELING DISCUSSION: Status: ACTIVE | Noted: 2025-04-24

## 2025-07-28 PROBLEM — R79.89 ELEVATED TROPONIN: Status: ACTIVE | Noted: 2025-07-28

## 2025-07-28 PROBLEM — J96.01 ACUTE HYPOXIC RESPIRATORY FAILURE (HCC): Status: ACTIVE | Noted: 2025-07-28

## 2025-07-28 PROCEDURE — 71045 X-RAY EXAM CHEST 1 VIEW: CPT

## 2025-07-28 PROCEDURE — 70450 CT HEAD/BRAIN W/O DYE: CPT

## 2025-07-28 ASSESSMENT — CHA2DS2 SCORE
DIABETES: NO
AGE 75 OR GREATER: YES
VASCULAR DISEASE: NO
SEX: MALE
CHF OR LEFT VENTRICULAR DYSFUNCTION: NO
CHA2DS2 VASC SCORE: 2
AGE 65 TO 74: NO
PRIOR STROKE OR TIA OR THROMBOEMBOLISM: NO
HYPERTENSION: NO

## 2025-07-28 ASSESSMENT — FIBROSIS 4 INDEX: FIB4 SCORE: 1.56

## 2025-07-29 ENCOUNTER — APPOINTMENT (OUTPATIENT)
Dept: RADIOLOGY | Facility: MEDICAL CENTER | Age: 89
DRG: 177 | End: 2025-07-29
Payer: MEDICARE

## 2025-07-29 PROBLEM — J12.82 PNEUMONIA DUE TO COVID-19 VIRUS: Status: ACTIVE | Noted: 2025-07-29

## 2025-07-29 PROBLEM — U07.1 PNEUMONIA DUE TO COVID-19 VIRUS: Status: ACTIVE | Noted: 2025-07-29

## 2025-07-29 PROCEDURE — 71045 X-RAY EXAM CHEST 1 VIEW: CPT

## 2025-07-29 ASSESSMENT — LIFESTYLE VARIABLES
TOTAL SCORE: 0
DOES PATIENT WANT TO STOP DRINKING: NO
ON A TYPICAL DAY WHEN YOU DRINK ALCOHOL HOW MANY DRINKS DO YOU HAVE: 0
EVER HAD A DRINK FIRST THING IN THE MORNING TO STEADY YOUR NERVES TO GET RID OF A HANGOVER: NO
HAVE PEOPLE ANNOYED YOU BY CRITICIZING YOUR DRINKING: NO
AVERAGE NUMBER OF DAYS PER WEEK YOU HAVE A DRINK CONTAINING ALCOHOL: 0
ALCOHOL_USE: NO
HOW MANY TIMES IN THE PAST YEAR HAVE YOU HAD 5 OR MORE DRINKS IN A DAY: 0
EVER FELT BAD OR GUILTY ABOUT YOUR DRINKING: NO
HAVE YOU EVER FELT YOU SHOULD CUT DOWN ON YOUR DRINKING: NO
TOTAL SCORE: 0
CONSUMPTION TOTAL: NEGATIVE
TOTAL SCORE: 0

## 2025-07-29 ASSESSMENT — COGNITIVE AND FUNCTIONAL STATUS - GENERAL
DAILY ACTIVITIY SCORE: 12
SUGGESTED CMS G CODE MODIFIER MOBILITY: CL
TURNING FROM BACK TO SIDE WHILE IN FLAT BAD: A LITTLE
DRESSING REGULAR UPPER BODY CLOTHING: TOTAL
CLIMB 3 TO 5 STEPS WITH RAILING: TOTAL
STANDING UP FROM CHAIR USING ARMS: A LOT
WALKING IN HOSPITAL ROOM: TOTAL
DRESSING REGULAR LOWER BODY CLOTHING: A LITTLE
TOILETING: A LOT
PERSONAL GROOMING: A LOT
MOBILITY SCORE: 12
SUGGESTED CMS G CODE MODIFIER DAILY ACTIVITY: CL
MOVING TO AND FROM BED TO CHAIR: A LOT
EATING MEALS: A LOT
MOVING FROM LYING ON BACK TO SITTING ON SIDE OF FLAT BED: A LITTLE
HELP NEEDED FOR BATHING: A LOT

## 2025-07-29 ASSESSMENT — FIBROSIS 4 INDEX
FIB4 SCORE: 2.24
FIB4 SCORE: 2.59

## 2025-07-29 ASSESSMENT — PAIN DESCRIPTION - PAIN TYPE
TYPE: ACUTE PAIN

## 2025-07-29 NOTE — CARE PLAN
The patient is Stable - Low risk of patient condition declining or worsening    Shift Goals  Clinical Goals: Monitor o2, Monitor VS, Safety '  Patient Goals: rest  Family Goals: support/updates    Progress made toward(s) clinical / shift goals:      Problem: Safety - Medical Restraint  Goal: Free from restraint(s) (Restraint for Interference with Medical Device)  Description: INTERVENTIONS:  1.  ONCE/SHIFT or MINIMUM Q12H: Assess and document the continuing need for restraints  2.  Q24H: Continued use of restraint requires LIP to perform face to face examination and written order  3.  Identify and implement measures to help patient regain control  4.  Educate patient/family on discontinuation criteria   5.  Assess patient's understanding and retention of education provided  6.  Assess readiness for release & initiate progressive release per protocol  7.  Identify and document criteria for restraints  Note: Complaint with assessments. Remained free of injury. Educated on on discontinuation criteria

## 2025-07-29 NOTE — ED PROVIDER NOTES
ED Provider Note    CHIEF COMPLAINT  Chief Complaint   Patient presents with    Other       EXTERNAL RECORDS REVIEWED  Outpatient Notes patient was seen by interventional cardiology 6/10/2025 for follow-up of paroxysmal atrial fibrillation, continuous oral anticoagulation, hypertension    HPI/ROS  LIMITATION TO HISTORY   Select: Altered mental status / Confusion  OUTSIDE HISTORIAN(S):  Family providing history    Sammy Yanez is a 89 y.o. male who presents to the emergency department for evaluation of altered mentation.  This has been waxing and waning for 10 days but worsening over the last 3 days and has at its worst today where the patient is no longer able to answer basic questions and is very confused.  They report at baseline he is alert and oriented x 4.  They report the patient had a fall about 10 days ago during which he struck his head but declined going to the hospital in the confusion began gradually from that time.  They report he occasionally has a cough, does not use oxygen at home.  He has not otherwise been complaining of anything specifically.  He has a history of UTIs in the past.  He is anticoagulated which he has been taking.  He takes metformin for diabetes.    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    SURGICAL HISTORY   has a past surgical history that includes cholecystectomy; appendectomy; and other (2004).    FAMILY HISTORY  No family history on file.    SOCIAL HISTORY  Social History     Tobacco Use    Smoking status: Former     Current packs/day: 0.25     Average packs/day: 0.3 packs/day for 20.0 years (5.0 ttl pk-yrs)     Types: Cigarettes    Smokeless tobacco: Never    Tobacco comments:     Quit around age 41.   Vaping Use    Vaping status: Never Used  "  Substance and Sexual Activity    Alcohol use: Not Currently    Drug use: Never    Sexual activity: Not on file       CURRENT MEDICATIONS  Home Medications       Reviewed by Abdelrahman Jara (Pharmacy Tech) on 07/28/25 at 2330  Med List Status: Complete     Medication Last Dose Status   albuterol (PROVENTIL) 2.5mg/3ml Nebu Soln solution for nebulization 7/28/2025 Active   apixaban (ELIQUIS) 5mg Tab 7/28/2025 Active   atorvastatin (LIPITOR) 40 MG Tab 7/27/2025 Active   citalopram (CELEXA) 20 MG Tab 7/28/2025 Active   cyanocobalamin (VITAMIN B-12) 500 MCG Tab 7/27/2025 Active   finasteride (PROSCAR) 5 MG Tab 7/28/2025 Active   guaiFENesin ER (MUCINEX) 600 MG TABLET SR 12 HR 7/28/2025 Active   levothyroxine (SYNTHROID) 25 MCG Tab 7/28/2025 Active   lisinopril (PRINIVIL) 10 MG Tab 7/28/2025 Active   metFORMIN (GLUCOPHAGE) 500 MG Tab 7/28/2025 Active   metoprolol SR (TOPROL XL) 100 MG TABLET SR 24 HR 7/27/2025 Active   metoprolol SR (TOPROL XL) 50 MG TABLET SR 24 HR 7/28/2025 Active   Omega-3 Fatty Acids (FISH OIL) 1000 MG Cap capsule 7/27/2025 Active   therapeutic multivitamin-minerals (THERAGRAN-M) Tab 7/27/2025 Active                  Audit from Redirected Encounters    **Home medications have not yet been reviewed for this encounter**         ALLERGIES  Allergies[1]    PHYSICAL EXAM  VITAL SIGNS: /68   Pulse 79   Temp 36.8 °C (98.3 °F) (Temporal)   Resp 17   Ht 1.702 m (5' 7\")   Wt 97.1 kg (214 lb)   SpO2 91%   BMI 33.52 kg/m²    Constitutional: Somewhat anxious appearing laying curled over on his right side but does answer his name, smiles  HEENT: Atraumatic, normocephalic, pupils are equal round reactive to light, nose normal, mouth shows dry mucous membranes  Neck: Supple, no midline tenderness.  Cardiovascular: Regular rate and rhythm, no murmur, rub or gallop, 2+ pulses peripherally x4  Thorax & Lungs: No respiratory distress, no wheezes, rales or rhonchi, no chest wall tenderness.  GI: Soft, " non-distended, non-tender, no rebound  Skin: Warm, dry, no acute rash or lesion  Musculoskeletal: Moving all extremities, no acute deformity, trace lower extremity edema, no tenderness  Neurologic: A&Ox1, moving all extremities but not following specific commands.  Globally encephalopathic  Psychiatric: Appropriate affect for situation at this time      EKG/LABS  Labs Reviewed   CBC WITH DIFFERENTIAL - Abnormal; Notable for the following components:       Result Value    RBC 4.57 (*)     RDW 51.8 (*)     MPV 8.6 (*)     Neutrophils-Polys 93.10 (*)     Lymphocytes 5.20 (*)     Lymphs (Absolute) 0.41 (*)     All other components within normal limits   COMP METABOLIC PANEL - Abnormal; Notable for the following components:    Co2 17 (*)     Bun 84 (*)     Correct Calcium 10.7 (*)     AST(SGOT) 64 (*)     ALT(SGPT) 59 (*)     Globulin 4.4 (*)     All other components within normal limits   TROPONIN - Abnormal; Notable for the following components:    Troponin T 33 (*)     All other components within normal limits   PROBRAIN NATRIURETIC PEPTIDE, NT - Abnormal; Notable for the following components:    NT-proBNP 384 (*)     All other components within normal limits   URINALYSIS,CULTURE IF INDICATED - Abnormal; Notable for the following components:    Ketones Trace (*)     Leukocyte Esterase Small (*)     All other components within normal limits   ESTIMATED GFR - Abnormal; Notable for the following components:    GFR (CKD-EPI) 52 (*)     All other components within normal limits   TROPONIN - Abnormal; Notable for the following components:    Troponin T 33 (*)     All other components within normal limits   PROCALCITONIN - Abnormal; Notable for the following components:    Procalcitonin 0.42 (*)     All other components within normal limits   PROCALCITONIN - Abnormal; Notable for the following components:    Procalcitonin 0.42 (*)     All other components within normal limits   URINE MICROSCOPIC (W/UA) - Abnormal; Notable  for the following components:    WBC 6-10 (*)     All other components within normal limits   HEMOGLOBIN A1C - Abnormal; Notable for the following components:    Glycohemoglobin 7.4 (*)     All other components within normal limits   POC COV-2, FLU A/B, RSV BY PCR - Abnormal; Notable for the following components:    POC SARS-CoV-2, PCR POSITIVE (*)     All other components within normal limits   LACTIC ACID   BLOOD CULTURE   BLOOD CULTURE   DIFFERENTIAL MANUAL   PERIPHERAL SMEAR REVIEW   PLATELET ESTIMATE   MORPHOLOGY   URINE CULTURE(NEW)   CULTURE RESPIRATORY W/ GRM STN   CBC WITHOUT DIFFERENTIAL   COMP METABOLIC PANEL   POCT COV-2, FLU A/B, RSV BY PCR       Results for orders placed or performed during the hospital encounter of 25   EKG (Now)   Result Value Ref Range    Report       [facility] Emergency Dept.    Test Date:  2025  Pt Name:    DAIJA HOLBROOK              Department: ER  MRN:        [id number]                      Room:        14  Gender:     Male                         Technician: [id number]  :        [id number]                   Requested By:ELENA BARKSDALE  Order #:    [id number]                    Reading MD: Elena Barksdale    Measurements  Intervals                                Axis  Rate:       85                           P:          32  AR:         199                          QRS:        -44  QRSD:       103                          T:          5  QT:         402  QTc:        478    Interpretive Statements  Normal sinus rhythm at a rate of 85, left anterior fascicular block, normal  intervals, no ST segment elevation or depression nor pathologic T wave  inversion.  No change from prior EKG.  No evidence of acute ischemia.  Electronically Signed On 2025 01:03:34 PDT by Elena Barksdlae         I have independently interpreted this EKG    RADIOLOGY/PROCEDURES   I have independently interpreted the diagnostic imaging associated with this visit and am  waiting the final reading from the radiologist.   My preliminary interpretation is as follows: CT head with no intracranial hemorrhage.  Chest x-ray demonstrating bilateral pneumonia most pronounced on the left    Radiologist interpretation:  DX-CHEST-PORTABLE (1 VIEW)   Final Result         1.  Scattered hazy bilateral pulmonary infiltrates, greatest on the left.      CT-HEAD W/O   Final Result         1.  No acute intracranial abnormality is identified, there are nonspecific white matter changes, commonly associated with small vessel ischemic disease.  Associated mild cerebral atrophy is noted.   2.  Atherosclerosis.             COURSE & MEDICAL DECISION MAKING    ASSESSMENT, COURSE AND PLAN  Care Narrative:     7:50 PM patient presents to the emergency department brought in by family members for evaluation of confusion which has been worsening over the last 10 days but acutely worse over the last 3 days.  Patient with a fall 10 days ago possibly including head strike and is anticoagulated.  He has no evidence of acute head trauma externally on examination.  He arrives with stable vital signs though somewhat soft blood pressures and is clearly quite confused, currently alert and oriented only to himself.  He has a nonfocal neurologic examination otherwise and is globally encephalopathic.  Broad differential diagnosis considered including sepsis with most likely pulmonary source given his new oxygen requirement including pneumonia, pneumonitis versus UTI, intracranial hemorrhage, electrolyte abnormality such as hypoglycemia or hyponatremia.  Point-of-care blood sugar obtained shortly after arrival and he is not hypoglycemic.  Broad laboratory and imaging workup ordered including CT head, chest x-ray to further assess.      8:00 PM Blood pressure somewhat lower.  IV fluid resuscitation initiated with lactated Ringer solution given persistent concern for hyponatremia.  Will monitor closely.    Labs reviewed.  Patient  with no significant leukocytosis but does have a left shift.  Chest x-ray demonstrates multifocal pneumonia.  Patient now on 4 L supplemental oxygen.  Family provide additional history that different family members were recently sick with COVID a few weeks ago at about the time the patient's symptoms started.  I suspect he may have COVID pneumonitis.  Will obtain COVID test.    9:30 PM reviewed vital signs including that patient was transiently hypotensive.  In the setting of such 30 cc/kg IV fluids initiated and broad-spectrum antibiotics to cover for pulmonary source were initiated.  Lactate is not elevated however.    CT head with no intracranial hemorrhage.  Patient is COVID-19 positive.  Will discuss case with hospitalist for admission.    Case discussed with Dr. Leon, hospitalist who accepts for admission.    CRITICAL CARE  The very real possibilty of a deterioration of this patient's condition required the highest level of my preparedness for sudden, emergent intervention.  I provided critical care services, which included medication orders, frequent reevaluations of the patient's condition and response to treatment, ordering and reviewing test results, and discussing the case with various consultants.  The critical care time associated with the care of the patient was 30 minutes. Review chart for interventions. This time is exclusive of any other billable procedures.       Sepsis: Infection was suspected 9:27 PM (Time). Sepsis pathway was initiated. 30cc/kg bolus given. Antibiotics were given per protocol.          ADDITIONAL PROBLEMS MANAGED  Acute hypoxic respiratory failure  Transient hypotension fluid responsive    DISPOSITION AND DISCUSSIONS  I have discussed management of the patient with the following physicians and MAXWELL's: Hospitalist as above    Decision tools and prescription drugs considered including, but not limited to: Antibiotics ceftriaxone and azithromycin.    FINAL DIAGNOSIS  1. Altered  mental status, unspecified altered mental status type    2. Acute hypoxic respiratory failure (HCC)    3. Community acquired pneumonia, bilateral    4. COVID-19         Electronically signed by: Jose Barksdale M.D., 7/28/2025 7:39 PM           [1] No Known Allergies

## 2025-07-29 NOTE — TELEPHONE ENCOUNTER
Patient noted to be currently hospitalised - possible COVID PNA with resp failure .   UA showing sterile pyuria with ketones- this pyuria may be chronic

## 2025-07-29 NOTE — ED NOTES
Pt refusing to keep oxygen cannula in nares. Pt oximetry at 87% with good waveform. Nasal cannula placed in nose, secured with tape. SpO2 at 91% on 5 LPM oxygen therapy. RN notified.

## 2025-07-29 NOTE — H&P
Hospital Medicine History & Physical Note    Date of Service  7/28/2025    Primary Care Physician  Jj Molina M.D.        Code Status  Full Code    Chief Complaint  Chief Complaint   Patient presents with    Other       History of Presenting Illness  Sammy Yanez is a 89 y.o. male who presented 7/28/2025 with altered mental status.  Patient has a known past medical history of hypertension, hyperlipidemia, atrial fibrillation on anticoagulation, and hypothyroidism.  According the patient's family members, they have noticed that the patient has been more confused over the last 5 to 10 days.  They have noticed that the symptoms have been getting worse over the last 3 days.  According the patient's daughter who is at bedside, she did get tested for COVID and tested +10 days ago.  She is concerned that she exposed her father to it, causing his symptoms.  While in emergency department, he was requiring 5 L supplemental oxygen.  He is on only oxygen pendant at home.  Chest x-ray was obtained demonstrating bilateral infiltrates.  He does have a positive procalcitonin level.  CT head was obtained was negative for any intracranial abnormalities.  Patient will be admitted for acute hypoxic respiratory failure and acute encephalopathy.    I discussed the plan of care with patient, family, and bedside RN.    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    Surgical History   has a past surgical history that includes cholecystectomy; appendectomy; and other (2004).     Family History  No family history on file.     Family history reviewed with patient. There is no family history that is  pertinent to the chief complaint.     Social History   reports that he has quit smoking. His smoking use included cigarettes. He has a 5 pack-year smoking history. He has never used smokeless tobacco. He reports that he does not currently use alcohol. He reports that he does not use drugs.    Allergies  Allergies[1]    Medications  Prior to Admission Medications   Prescriptions Last Dose Informant Patient Reported? Taking?   Omega-3 Fatty Acids (FISH OIL) 1000 MG Cap capsule 7/27/2025 Morning Family Member Yes Yes   Sig: Take 1,000 mg by mouth every day.   albuterol (PROVENTIL) 2.5mg/3ml Nebu Soln solution for nebulization 7/28/2025 Morning Family Member No Yes   Sig: Take 3 mL by nebulization every four hours as needed for Shortness of Breath. Use with nebuliser machine that should be delivered separately through DME company - contact PCP office if you dont hear drom DME company   apixaban (ELIQUIS) 5mg Tab 7/28/2025 Morning Family Member No Yes   Sig: Take 1 Tablet by mouth 2 times a day.   atorvastatin (LIPITOR) 40 MG Tab 7/27/2025 Bedtime Family Member No Yes   Sig: Take 1 Tablet by mouth every day. Take 1 tablet by mouth every night at bedtime   citalopram (CELEXA) 20 MG Tab 7/28/2025 Morning Family Member No Yes   Sig: TAKE 1 TABLET BY MOUTH EVERY DAY   cyanocobalamin (VITAMIN B-12) 500 MCG Tab 7/27/2025 Morning Family Member Yes Yes   Sig: Take 500 mcg by mouth every day.   finasteride (PROSCAR) 5 MG Tab 7/28/2025 Morning Family Member Yes Yes   Sig: Take 5 mg by mouth every day.   guaiFENesin ER (MUCINEX) 600 MG TABLET SR 12 HR 7/28/2025 Morning Family Member No Yes   Sig: TAKE 1 TABLET BY MOUTH EVERY 12 HOURS AS NEEDED FOR COUGH   levothyroxine (SYNTHROID) 25 MCG Tab 7/28/2025 Morning Family Member No Yes   Sig: Take 1 Tablet by mouth every morning on an empty stomach. Except one day of the week when he should take two tablets instead of one ( total 50mcg)   lisinopril (PRINIVIL) 10 MG Tab 7/28/2025 Morning  Family Member No Yes   Sig: Take 1 Tablet by mouth every day.   metFORMIN (GLUCOPHAGE) 500 MG Tab 7/28/2025 Morning Family Member No Yes   Sig: TAKE 1 TABLET BY MOUTH TWICE DAILY WITH MEALS. START WITH 1 TABLET A DAY FOR FIRST WEEK   metoprolol SR (TOPROL XL) 100 MG TABLET SR 24 HR 7/27/2025 Bedtime Family Member No Yes   Sig: Take 1 Tablet by mouth at bedtime.   metoprolol SR (TOPROL XL) 50 MG TABLET SR 24 HR 7/28/2025 Morning Family Member No Yes   Sig: Take 1 Tablet by mouth every day.   therapeutic multivitamin-minerals (THERAGRAN-M) Tab 7/27/2025 Morning Family Member Yes Yes   Sig: Take 1 Tablet by mouth every day.      Facility-Administered Medications: None       Physical Exam  Temp:  [36.8 °C (98.3 °F)] 36.8 °C (98.3 °F)  Pulse:  [70-87] 79  Resp:  [16-20] 17  BP: ()/(42-70) 116/68  SpO2:  [89 %-94 %] 91 %  Blood Pressure : 116/68   Temperature: 36.8 °C (98.3 °F)   Pulse: 79   Respiration: 17   Pulse Oximetry: 91 %       Physical Exam  Constitutional:       General: He is not in acute distress.     Appearance: Normal appearance. He is normal weight. He is not ill-appearing, toxic-appearing or diaphoretic.   HENT:      Head: Normocephalic and atraumatic.      Mouth/Throat:      Mouth: Mucous membranes are moist.   Eyes:      Pupils: Pupils are equal, round, and reactive to light.   Cardiovascular:      Rate and Rhythm: Normal rate and regular rhythm.      Pulses: Normal pulses.      Heart sounds: Normal heart sounds. No murmur heard.     No friction rub. No gallop.   Pulmonary:      Effort: Pulmonary effort is normal. No respiratory distress.      Breath sounds: Normal breath sounds. No stridor. No wheezing, rhonchi or rales.   Chest:      Chest wall: No tenderness.   Abdominal:      General: There is no distension.      Palpations: There is no mass.      Tenderness: There is no abdominal tenderness. There is no guarding or rebound.      Hernia: No hernia is present.   Musculoskeletal:         General:  No swelling, tenderness, deformity or signs of injury.      Right lower leg: No edema.      Left lower leg: No edema.   Skin:     General: Skin is warm and dry.      Capillary Refill: Capillary refill takes less than 2 seconds.      Coloration: Skin is not jaundiced or pale.      Findings: No bruising, erythema, lesion or rash.   Neurological:      General: No focal deficit present.      Mental Status: He is alert. Mental status is at baseline. He is disoriented.      Cranial Nerves: No cranial nerve deficit.      Sensory: No sensory deficit.      Motor: No weakness.      Coordination: Coordination normal.   Psychiatric:         Mood and Affect: Mood normal.         Laboratory:  Recent Labs     07/28/25 1900   WBC 7.9   RBC 4.57*   HEMOGLOBIN 14.4   HEMATOCRIT 42.5   MCV 93.0   MCH 31.5   MCHC 33.9   RDW 51.8*   PLATELETCT 331   MPV 8.6*     Recent Labs     07/28/25 1900   SODIUM 138   POTASSIUM 4.7   CHLORIDE 105   CO2 17*   GLUCOSE 94   BUN 84*   CREATININE 1.31   CALCIUM 10.5     Recent Labs     07/28/25  1900   ALTSGPT 59*   ASTSGOT 64*   ALKPHOSPHAT 56   TBILIRUBIN 1.0   GLUCOSE 94         Recent Labs     07/28/25  1900   NTPROBNP 384*         Recent Labs     07/28/25  1900 07/28/25  2054   TROPONINT 33* 33*       Imaging:  DX-CHEST-PORTABLE (1 VIEW)   Final Result         1.  Scattered hazy bilateral pulmonary infiltrates, greatest on the left.      CT-HEAD W/O   Final Result         1.  No acute intracranial abnormality is identified, there are nonspecific white matter changes, commonly associated with small vessel ischemic disease.  Associated mild cerebral atrophy is noted.   2.  Atherosclerosis.             X-Ray:  I have personally reviewed the images and compared with prior images.  EKG:  I have personally reviewed the images and compared with prior images.    Assessment/Plan:  Justification for Admission Status  I anticipate this patient will require at least two midnights for appropriate medical  management, necessitating inpatient admission because acute hypoxic respiratory failure    Patient will need a Telemetry bed on MEDICAL service .  The need is secondary to acute hypoxic respiratory failure.    * Acute hypoxic respiratory failure (HCC)- (present on admission)  Assessment & Plan  Patient currently wearing 5 L supplemental oxygen.  He is not normally oxygen dependent at home  Chest x-ray: Scattered hazy bilateral pulmonary infiltrates, greatest on the left.   According to patient's family members, he did have a recent COVID exposure  Follow-up COVID panel  Given his elevated Pro-Drew levels, will empirically start the patient on IV Unasyn and azithromycin  De-escalate antibiotics appropriately  If patient is COVID-positive, start patient on IV Decadron  Respiratory therapy consult  Albuterol DuoNebs as needed  Continue to titrate off oxygen      AF (atrial fibrillation) ([facility])  Assessment & Plan  Continue home dose metoprolol and apixaban  Currently rate controlled      Acute encephalopathy  Assessment & Plan  Likely secondary to an infectious source  Renal failure numbers, he has been altered for the last 5 to 10 days, but has been worse over the last 3 days  CT head was obtained was negative for any acute intracranial abnormalities      Elevated troponin  Assessment & Plan  Troponin 33 x 2  Likely secondary to demand ischemia  No indication for repeat troponin levels  EKG without any ST segment elevation or depression      Hypothyroid- (present on admission)  Assessment & Plan  Follow up TSH  Continue synthroid     Essential hypertension- (present on admission)  Assessment & Plan  Continue metoprolol and lisinopril    Advanced care planning/counseling discussion- (present on admission)  Assessment & Plan  I spent 17 minutes at bedside with patient discussing work-up, results, diagnosis, prognosis.  I had a long discussion with the patient and the patient's family at bedside while in the emergency  department.  At this time, they would like all life-saving measures including chest compressions intubation  CODE STATUS discussed with patient and wants to be full code          VTE prophylaxis: therapeutic anticoagulation with Eliquis       [1] No Known Allergies

## 2025-07-29 NOTE — PROGRESS NOTES
Telemetry Report:        Rhythm: NSR               Heart Rate: 90       Ectopy: PVCs, PACs, Bigem, Trigem                  WI: .21                     QRS: .09           QT: .42             Per Telestrip from Monitor Room

## 2025-07-29 NOTE — ED NOTES
S166 RN at bedside. Pt trying to pull off oxygen mask and pulling at lines. Pt redirected to keep oxygen mask back on and stop pulling on lines. Hospitalist aware. Pt transported to [facility] via [facility], on monitor, on 15L non-rebreather. All belongings with pt.

## 2025-07-29 NOTE — PROGRESS NOTES
Brief ID note:    -Baricitinib approval requested by [facility] resident Dr. Cummins  -Chart reviewed.  Patient admitted with COVID-19 pneumonia with progressive oxygen requirements, currently on HFNC FiO2 40%  -Patient is already on dexamethasone, and eliquis for afib  -Monitor daily CMP  -Check QuantiFERON gold and acute hepatitis panel - orders placed  -Discontinue baricitinib if:  ALT >250  AST >225  eGFR <15  ALC < 200   ANC < 1000  Hg < 8  -OK to discontinue barticinib if patient cleared for discharge  -Please provide EUA fact sheet regarding baricitinib  -Continue management per hospitalist teams. Please call us back if formal consult requested

## 2025-07-29 NOTE — ED NOTES
Bedside report received from off going RN/tech: Davin RN, assumed care of patient.  POC discussed with patient. Call light within reach, all needs addressed at this time.       Fall risk interventions in place: Move the patient closer to the nurse's station, Patient's personal possessions are with in their safe reach, Place fall risk sign on patient's door, Give patient urinal if applicable, Keep floor surfaces clean and dry, Accompanied to restroom, and Bed Alarm in use (all applicable per [facility] Fall risk assessment)   Continuous monitoring: Cardiac Leads, Pulse Ox, or Blood Pressure  IVF/IV medications: Infusion per MAR (List Med(s)) LR 1983ml bolus  Oxygen: How many liters 5L  Bedside sitter: Not Applicable   Isolation: Not Applicable

## 2025-07-29 NOTE — PROGRESS NOTES
Progress Note    I was called to evaluate Sammy Yanez for ICU/IMCU admission.     89 yo M with pmh HTN, DLD, AF on apixiban admitted with toxic metabolic encephalopathy and found to have COVID. CXR with bilateral infiltrative lung injury.     Obese M  Confused and non-communicative to me  S1+S2  Bilateral ronchi  Protuberant and soft    On 100% 40L    ABG 7.42/25/61  Procal: 0.42    I spoke with the family at length regarding his situation. His current lung injury and PF of 61 would qualify for severe ARDS. To appropriately treat him he would require intubation, deep sedation, proning, potential paralysis. I am not certain he would survive ARDS treatment and if he did he would likely be severely debilitated given his age, medical comrbidities. The family told me that he had previously documented that he would not want intubation or CPR as part of his care. They reported that he was resistant to coming to the hospital and that he they had been evaluating putting him on hospice because of a recent decline in his health following a fall and a lack of eating (they report 2-3 weeks of decreased appetite and only intermittently taking his medications). They asked what his chances of survivng were I told them that I honestly did not know, but that his treatment, if we chose to go down that pathway would likely end up with him in a nursing home with disability.     After a thorough conversation we used shared decision-making and decided to not escalate care to intubation and CPR. The family reports that ultimately they do not want him to suffer and hope that he gets better. We agreed to continue the current course. If he gets worse the plan will be to focus on his comfort.    A/P  --Transfer to IMCU  --DNR/DNI  --Okay with remdesivir but would get ID involved    Raghu Roberts DO MPH  Critical Care Medicine

## 2025-07-29 NOTE — PROGRESS NOTES
[facility] Internal Medicine Daily Progress Note    Date of Service  7/29/2025    [facility] Team: [facility] IM Purple Team   Attending: Anthony Rosario D.o.  Senior Resident: Dr. Campbell  Intern:  Dr. Marissa Rivera  Contact Number: [phone]    Chief Complaint  Sammy Yanez is a 89 y.o. male admitted 7/28/2025 with altered mental status and shortness of breath.    Hospital Course  Sammy Yanez is a 89 y.o. male admitted 7/28/2025 with complaint of altered mental status and shortness of breath. 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 According to the patient's daughter, he was feeling confused for the past 5 days and it seemingly increased. He was exposed to COVID 19, for which his daughter tested positive 10 days ago. Upon arrival to the ER he required 5 L of oxygen.Chest xray was done that showed scattered hazy B/L pulmonary infiltrates  more on the left side. CT head was done that showed no acute intracranial abnormality.His pro calcitonin was elevated to 0.42.     The patient tested positive for COVID 19. His got his COVID vaccine in 2020 and did not get any booster. He is up to date on his pneumococcal vaccine.     The patient was found to be in acute hypoxic respiratory failure initially requiring 5 L of oxygen. And then his oxygen requirement gradually increased upto 40 L on high flow nasal cannula. He was started on 6 mg IV steroids and Barcitinib as per ID recommendations.The patient was decompensating so ICU Intensivist was consulted and he was transferred to [facility].     Interval Problem Update  -The patient's respiratory status was worsening with increased oxygen requirements upto 40 L on high flow nasal cannula. Therefore, ICU intensivist was consulted and the patient was transferred to [facility] fro higher level of care  - Code Status was discussed with the patient's daughter and son and they decided to switch from a full code to DNR/DNI.  - ID was informally consulted  and patient was started on Barcitinib and Quantiferon and Hepatitis panel was ordered  -He was started on 6 mg IV steroid as well as scheduled duonebs.  -chest xray was repeated and it shows hazy bilateral infiltrates and it seems to worsen compared to his chest xray on 7/28/2025.    I have discussed this patient's plan of care and discharge plan at IDT rounds today with Case Management, Nursing, Nursing leadership, and other members of the IDT team.    Consultants/Specialty  critical care    Code Status  DNAR/DNI    Disposition  The patient is not medically cleared for discharge to home or a post-acute facility.      I have placed the appropriate orders for post-discharge needs.    Review of Systems  Review of Systems   Unable to perform ROS: Acuity of condition        Physical Exam  Temp:  [36.4 °C (97.5 °F)-37 °C (98.6 °F)] 36.5 °C (97.7 °F)  Pulse:  [62-95] 84  Resp:  [15-30] 26  BP: ()/(40-76) 92/55  SpO2:  [84 %-98 %] 96 %    Physical Exam  Constitutional:       General: He is in acute distress.      Appearance: He is ill-appearing.   Pulmonary:      Effort: Respiratory distress present.         Fluids    Intake/Output Summary (Last 24 hours) at 7/29/2025 1715  Last data filed at 7/29/2025 1200  Gross per 24 hour   Intake 924.41 ml   Output 270 ml   Net 654.41 ml       Laboratory  Recent Labs     07/28/25  1900 07/29/25  0838   WBC 7.9 6.2   RBC 4.57* 3.86*   HEMOGLOBIN 14.4 12.1*   HEMATOCRIT 42.5 35.9*   MCV 93.0 93.0   MCH 31.5 31.3   MCHC 33.9 33.7   RDW 51.8* 51.9*   PLATELETCT 331 271   MPV 8.6* 8.2*     Recent Labs     07/28/25  1900 07/29/25  0838   SODIUM 138 138   POTASSIUM 4.7 4.2   CHLORIDE 105 109   CO2 17* 16*   GLUCOSE 94 113*   BUN 84* 59*   CREATININE 1.31 1.02   CALCIUM 10.5 9.1                   Imaging  DX-CHEST-PORTABLE (1 VIEW)   Final Result         1.  Scattered hazy bilateral pulmonary infiltrates, greatest on the left, stable since prior study.         DX-CHEST-PORTABLE (1 VIEW)    Final Result         1.  Scattered hazy bilateral pulmonary infiltrates, greatest on the left.      CT-HEAD W/O   Final Result         1.  No acute intracranial abnormality is identified, there are nonspecific white matter changes, commonly associated with small vessel ischemic disease.  Associated mild cerebral atrophy is noted.   2.  Atherosclerosis.              Assessment/Plan  Problem Representation:    * Acute hypoxic respiratory failure (HCC)- (present on admission)  Assessment & Plan  Patient currently wearing 5 L supplemental oxygen.  He is not normally oxygen dependent at home  Chest x-ray: Scattered hazy bilateral pulmonary infiltrates, greatest on the left.   --He was started on 6 mg IV steroid as well as scheduled duonebs.  .    AF (atrial fibrillation) (HCC)  Assessment & Plan  Continue home dose metoprolol and apixaban  Currently rate controlled.      Acute encephalopathy  Assessment & Plan  Likely secondary to an infectious source  Renal failure numbers, he has been altered for the last 5 to 10 days, but has been worse over the last 3 days  CT head was obtained was negative for any acute intracranial abnormalities.      Elevated troponin  Assessment & Plan  Troponin 33 x 2  Likely secondary to demand ischemia  No indication for repeat troponin levels  EKG without any ST segment elevation or depression.      Advanced care planning/counseling discussion- (present on admission)  Assessment & Plan  Code Status was discussed with the patient's daughter and son and they decided to switch from a full code to DNR/DNI.    Hypothyroid- (present on admission)  Assessment & Plan  Follow up TSH  Continue synthroid.    Essential hypertension- (present on admission)  Assessment & Plan  Continue metoprolol and lisinopril.         VTE prophylaxis: therapeutic anticoagulation with Apixaban    I have performed a physical exam and reviewed and updated ROS and Plan today (7/29/2025). In review of yesterday's note  (7/28/2025), there are no changes except as documented above.

## 2025-07-29 NOTE — TELEPHONE ENCOUNTER
7/28-5: 30 pm   Katharine came into the office on 07/29 around 5: pm  requesting for order for hospice stat.  Was told by the hospice team who evaluated Sammy that they could intervene for next few days and if improves then they will step back, but requires an order- for [facility] hospice,   I expressed concerns about not knowing what the cause for brice acute decline is and this potentially being something reversible like an infection .,   Katharine tells me that Satinder with hospice feels he may have a UTI as he now is incontinent to urine . They may be able to test and treat if that's the case.   Patient is definitely at risk of uti given history of gross hematuria/potential for AUR.  I also am concerned for a cva . Given that EKG done by joan was reported to be normal - ACS is less likely.   Pedro also brings up that sammy maybe hallucinating.   Hence I feel at this point he does not have capacity to make decisions for himself based on history alone. However in person evaluation required to determine that.   I am placing order for hospice,  Order provided to Katharine   She tells me that they will keep me updated through [facility].

## 2025-07-29 NOTE — NON-PROVIDER
[facility] INTERNAL MEDICINE PROGRESS NOTE   Medical Student Note    Attending: Santana Anderson M.D.  Senior Resident: Екатерина Singh M.D. (PGY-2)  Adonay Resident: Marissa Rivera M.D. (PGY-1)  Medical Student: Johanne Carreon MS4  PATIENT: Sammy Yanez; [id number]; 1935    Hospital Day # Hospital Day: 2      ID: 89 y.o. male with past medical history of diabetes, hypertension, hyperlipidemia, Afib on Eliquis, was admitted on 7/28/2025   for altered mental status and acute hypoxic respiratory failure.     SUBJECTIVE: Called to patient's bedside this morning as he was decompensating, requiring 40L on high flow nasal cannula. Daughter and son are at bedside. States that he has been increasingly confused, but seems improved today in terms of mental status. Family denies any other complaints n/v/d, fever, decreased appetite, chest pain.     Discussed goals of care with the family, initially they elected for full resuscitation, but after discussion, family is electing against intubation and CPR.     Interval Update  After being called to bedside, 6mg dexamethasone IV was ordered. ID was also consulted for baricitinib approval. Patient requires a higher level of care than is appropriate on the floor, so will consult IMCU for transfer.     Hospital Course:   Patient is a 89 y.o. male with past medical history of diabetes, hypertension, hyperlipidemia, Afib on Eliquis, and hypothyroidism who presented on 7/28/2025 for altered mental status and shortness of breath, worsening significantly over the past 3 days. Patient required 5L supplemental oxygen in the ED and was admitted for altered mental status and acute hypoxic respiratory failure. Patient's daughter tested positive for COVID >10 days ago.     Chest x-ray was obtained demonstrating bilateral infiltrates. Pro calcitonin was  positive. COVID postiive Family reported a distant head strike 10+ days ago, head CT done in ED was negative for acute processes. Started on  Azithromycin and IV Unasyn.      ROS  Review of Systems   Unable to perform ROS: Critical illness         OBJECTIVE:  PE:  Temp:  [36.4 °C (97.5 °F)-37 °C (98.6 °F)] 36.5 °C (97.7 °F)  Pulse:  [62-95] 91  Resp:  [15-30] 26  BP: ()/(40-76) 103/65  SpO2:  [84 %-98 %] 95 %    Physical Exam  Constitutional:       General: He is in acute distress.  HENT:      Head: Normocephalic and atraumatic.      Mouth: Mucous membranes are moist.   Eyes:      Pupils: Pupils are equal, round, and reactive to light.   Cardiovascular:      Rate and Rhythm: Normal rate and regular rhythm.      Pulses: Normal pulses.      Heart sounds: Normal heart sounds. No murmur heard.     No friction rub. No gallop.   Pulmonary:      Effort: Increased work of breathing. In acute respiratory distress     Breath sounds: Normal breath sounds. No stridor. No wheezing, rhonchi or rales.   Chest:      Chest wall: No tenderness.   Abdominal:      General: There is no distension.      Palpations: There is no mass.      Tenderness: There is no abdominal tenderness. There is no guarding or rebound.      Hernia: No hernia is present.   Musculoskeletal:         General: No swelling, tenderness, deformity or signs of injury.      Right lower leg: No edema.      Left lower leg: No edema.   Skin:     General: Skin is warm and dry.      Capillary Refill: Capillary refill takes less than 2 seconds.      Coloration: Skin is not jaundiced or pale.      Findings: No bruising, erythema, lesion or rash.   Neurological:      General: No focal deficit present.      Mental Status: He is alert. Mental status is at baseline. He is oriented to person and place,  disoriented to time     Cranial Nerves: No cranial nerve deficit.      Sensory: No sensory deficit.      Motor: No weakness.      Coordination: Coordination normal.   Psychiatric:         Mood and Affect: Agitated    LABS:  Recent Labs     07/28/25  1900 07/29/25  0838   WBC 7.9 6.2   RBC 4.57* 3.86*   HEMOGLOBIN  "14.4 12.1*   HEMATOCRIT 42.5 35.9*   MCV 93.0 93.0   MCH 31.5 31.3   RDW 51.8* 51.9*   PLATELETCT 331 271   MPV 8.6* 8.2*   NEUTSPOLYS 93.10* 81.90*   LYMPHOCYTES 5.20* 10.50*   MONOCYTES 0.90 4.70   EOSINOPHILS 0.80 1.30   BASOPHILS 0.00 0.30   RBCMORPHOLO Present  --      Recent Labs     07/28/25  1900 07/29/25  0838   SODIUM 138 138   POTASSIUM 4.7 4.2   CHLORIDE 105 109   CO2 17* 16*   BUN 84* 59*   CREATININE 1.31 1.02   CALCIUM 10.5 9.1   ALBUMIN 3.7 2.9*     Estimated GFR/CRCL = Estimated Creatinine Clearance: 54.9 mL/min (by C-G formula based on SCr of 1.02 mg/dL).  Recent Labs     07/28/25  1900 07/29/25  0838   GLUCOSE 94 113*     Recent Labs     07/28/25  1900 07/29/25  0838   ASTSGOT 64* 54*   ALTSGPT 59* 47   TBILIRUBIN 1.0 0.7   ALKPHOSPHAT 56 55   GLOBULIN 4.4* 3.8*         Recent Labs     07/29/25  0838   YFBFG92H 7.42   OYGNYO647X 25.5*   RCBLY522D 61.3*   MXXS9ODZ 86.0*   ARTHCO3 17*   ARTBE -6*     No results for input(s): \"INR\", \"APTT\", \"FIBRINOGEN\" in the last 72 hours.    Invalid input(s): \"DIMER\"    Intake/Output Summary (Last 24 hours) at 7/29/2025 1343  Last data filed at 7/29/2025 1200  Gross per 24 hour   Intake 924.41 ml   Output 270 ml   Net 654.41 ml       MICROBIOLOGY:   No results found for: \"BLOODCULTU\", \"BLDCULT\", \"BCHOLD\"     IMAGING:   DX-CHEST-PORTABLE (1 VIEW)   Final Result         1.  Scattered hazy bilateral pulmonary infiltrates, greatest on the left, stable since prior study.         DX-CHEST-PORTABLE (1 VIEW)   Final Result         1.  Scattered hazy bilateral pulmonary infiltrates, greatest on the left.      CT-HEAD W/O   Final Result         1.  No acute intracranial abnormality is identified, there are nonspecific white matter changes, commonly associated with small vessel ischemic disease.  Associated mild cerebral atrophy is noted.   2.  Atherosclerosis.             MEDS:  Current Facility-Administered Medications   Medication Last Admin    haloperidol lactate " (Haldol) injection 5 mg      Respiratory Therapy Consult      dexamethasone (Decadron) injection 6 mg 6 mg at 07/29/25 0809    ipratropium-albuterol (DUONEB) nebulizer solution      baricitinib (Olumiant) tablet 4 mg      ampicillin/sulbactam (Unasyn) 3 g in  mL IVPB      azithromycin (ZITHROMAX) 500 mg in D5W 250 mL IVPB premix Stopped at 07/29/25 0110    albuterol inhaler 2 Puff      apixaban (Eliquis) tablet 5 mg 5 mg at 07/29/25 0525    atorvastatin (Lipitor) tablet 40 mg 40 mg at 07/28/25 2334    citalopram (CeleXA) tablet 20 mg 20 mg at 07/29/25 0525    finasteride (Proscar) tablet 5 mg 5 mg at 07/29/25 0525    levothyroxine (Synthroid) tablet 25 mcg 25 mcg at 07/29/25 0525    lisinopril (Prinivil) tablet 10 mg      albuterol (Proventil) 2.5mg/0.5ml nebulizer solution 2.5 mg      insulin lispro (HumaLOG,AdmeLOG) subcutaneous injection      And    dextrose 50 % (D50W) injection 25 g      metoprolol SR (Toprol XL) tablet 100 mg      acetaminophen (Tylenol) tablet 650 mg      senna-docusate (Pericolace Or Senokot S) 8.6-50 MG per tablet 2 Tablet      And    polyethylene glycol/lytes (Miralax) Packet 1 Packet      hydrALAZINE (Apresoline) injection 10 mg      ondansetron (Zofran) syringe/vial injection 4 mg 4 mg at 07/29/25 0040    Or    ondansetron (Zofran ODT) dispertab 4 mg           ASSESSMENT/PLAN: 89 y.o. male with past medical history of diabetes, hypertension, hyperlipidemia, Afib on Eliquis, was admitted on 7/28/2025   for altered mental status and acute hypoxic respiratory failure secondary to COVID. CXR showed scattered hazy bilateral infiltrates.Required increasing oxygen support this morning. Likely impending respiratory deterioration. Patient was started on baricitinib and dexamethasone and transferred to the [facility] for higher level of care.       #Acute hypoxic respiratory failure (HCC)   Presented to the ED in respiratory distress requiring 5L supplemental O2. He is not on home O2. Daughter was  COVID positive >10 days ago. Patient is COVID positive. CXR showed scattered hazy bilateral infiltrates. Questionable for superposed pneumonia with positive procal.   -Supplemental oxygen  -IV Dexamethasone  -baricitinib as per ID approval  -Consult IM for higher level of care  -Cliff q4  -RT consult    #Acute encephalopathy  Family notes the patients has been increasingly confused over the past several days, waxing and waning. He is disoriented to time. Suspect this is likely secondary due to infectious etiology. Patient is COVID positive. Head CT negative for acute processes. Treatment will consistent of addressing underlying cause of Acute hypoxic respiratory failure.     #Elevated troponin   Patient with troponin of 33 x2 in the ED. He is not complaining of any chest pain, and EKG without concerning ST elevation or depression. Suspect this is secondary to demand ischemia.     #Essential hypertension  -Continue metoprolol and lisinopril     #Hypothyroid   -Continue synthroid     #AF (atrial fibrillation) (HCC)   Currently rate controlled  -Continue home dose metoprolol and apixaban    #Advanced care planning/counseling discussion  Discussed goals of care with the family, initially they elected for full resuscitation, but after discussion are electing against intubation and CPR.     #DISPOSITION  - Transfer to [facility] for higher level of care      Johanne Carreon, MS4  [employer]

## 2025-07-29 NOTE — ED NOTES
"Med rec updated and complete.  Allergies reviewed.        Interviewed family ( daughter) at bedside.     Daughter reports only giving the pt one dose  of Eliquis a day instead of BID.  Daughter stated that she \"misread\" and it was her mistake.  Pharmacist notified.    No antimicrobial medications in last 30 days.    Preferred Pharmacy  [facility] = [phone]  "

## 2025-07-29 NOTE — CARE PLAN
The patient is Watcher - Medium risk of patient condition declining or worsening    Shift Goals  Clinical Goals: Monitor o2, Monitor VS, Safety '  Patient Goals: rest  Family Goals: support/updates    Progress made toward(s) clinical / shift goals:      Problem: Skin Integrity  Goal: Skin integrity is maintained or improved  Outcome: Progressing     Problem: Safety - Medical Restraint  Goal: Remains free of injury from restraints (Restraint for Interference with Medical Device)  Outcome: Progressing  Goal: Free from restraint(s) (Restraint for Interference with Medical Device)  Outcome: Progressing     Problem: Pain - Standard  Goal: Alleviation of pain or a reduction in pain to the patient’s comfort goal  Outcome: Progressing     Problem: Psychosocial  Goal: Patient's level of anxiety will decrease  Outcome: Progressing

## 2025-07-29 NOTE — PROGRESS NOTES
4 Eyes Skin Assessment Completed by KARUNA España and KARUNA Starkey.    Skin assessment is primarily focused on high risk bony prominences. Pay special attention to skin beneath and around medical devices, high risk bony prominences, skin to skin areas and areas where the patient lacks sensation to feel pain and areas where the patient previously had breakdown.     Head (Occipital):  WDL   Ears (Under Medical Devices): Pink and Blanching   Nose (Under Medical Devices): WDL   Mouth:  WDL   Neck: WDL   Breast/Chest:  WDL   Shoulder Blades:  WDL   Spine:   Abrasion and Scab discoloration   (R) Arm/Elbow/Hand: Bruising   (L) Arm/Elbow/Hand: Bruising   Abdomen: WDL   Pannus/Groin:  Brown discoloration spots   Sacrum/Coccyx:   Pink and Blanching   (R) Ischial Tuberosity (Sit Bones):  WDL   (L) Ischial Tuberosity (Sit Bones):  WDL   (R) Leg:  WDL   (L) Leg:  WDL   (R) Heel:  Pink and Blanching   (R) Foot/Toe: WDL   (L) Heel: Pink and Blanching   (L) Foot/Toe:  WDL       DEVICES IN USE:   Respiratory Devices:  High flow nasal cannula and Pulse ox  Feeding Devices:  N/A   Lines & BP Monitoring Devices:  Peripheral IV, BP cuff, and Pulse ox    Orthopedic Devices:  N/A  Miscellaneous Devices:  Telemetry monitor, Condom cath, and Soft restraints    PROTOCOL INTERVENTIONS:   Standard/Trauma Bed:  Already in place  Q2 Turns with Pillows:  Already in place  Glide Sheet:  Already in place  Condom Cath/Purewick:  Already in place  High flow nasal cannula:  Already in place    WOUND PHOTOS:   N/A no wounds identified    WOUND CONSULT:   N/A, no advanced wound care needs identified

## 2025-07-29 NOTE — THERAPY
Physical Therapy Contact Note    Patient Name: Sammy Yanez  Age:  89 y.o., Sex:  male  Medical Record #: [id number]  Today's Date: 7/29/2025 07/29/25 1038   Initial Contact Note    Initial Contact Note Order Received and Verified, Physical Therapy Evaluation in Progress with Full Report to Follow.   Interdisciplinary Plan of Care Collaboration   IDT Collaboration with  Nursing   Collaboration Comments PT orders received. Pt is on HFNC and likely transferring to higher level of care per RN. Will hold and re-attempt as medically appropriate.   Session Information   Date / Session Number  7/29 - hold  (EVAL)     Mare Gonzalez, PT, DPT

## 2025-07-29 NOTE — THERAPY
Occupational Therapy Contact Note    Patient Name: Sammy Yanez  Age:  89 y.o., Sex:  male  Medical Record #: [id number]  Today's Date: 7/29/2025 07/29/25 1107   Interdisciplinary Plan of Care Collaboration   Collaboration Comments OT orders received, pt now on HFNC and likely txfing to higher level of care. Will hold eval and round back as able.

## 2025-07-29 NOTE — PROGRESS NOTES
Transferred to [facility] at this time accompanied by [facility] RN. Attempted to call RN at this time, states they will call RN back. Accompanied by family,all belongings taken.

## 2025-07-29 NOTE — PROGRESS NOTES
Patient received agitated, restless. Alert/oriented x1. Bilateral wrist restraints in place due to removing medical equipment. Switched to HFNC 40/100. Family at bedside and was updated by Mds.     0930 - Plan of care discussed with family. Daughter initially declining patient to go to ICU and would not tell RN why, asking to speak to Dr. Doctor spoke with daughter and is agreeable to transfer now.

## 2025-07-29 NOTE — ASSESSMENT & PLAN NOTE
Chest xray was ordered and it shows scattered B/L hazy infiltrates.  Patient has COVID Pneumonia   He was started don Barcitinib   6 mg IV steroids were given

## 2025-07-29 NOTE — ED NOTES
ERP at bedside. Pt resting on gurney in NAD. Vss. All needs met. Bed locked in lowest position. Call light in reach. Bed alarm on and functional. Family at bedside.

## 2025-07-29 NOTE — ASSESSMENT & PLAN NOTE
Had been acutely worsening  Now requiring high flow nasal cannula  Procalcitonin was mildly elevated, continue Unasyn and azithromycin  COVID was positive, infectious disease is now following  Patient remains DNR  Is at significant risk of worsening and does require close monitoring in the IMCU  Continue high flow nasal cannula, wean as able  Continuous pulse ox monitoring  Any attempted weaning and patient significantly desaturates    Patient is critically ill.   The patient continues to have: Acute respiratory failure with hypoxia  The vital organ system that is affected is the: Respiratory initially  If untreated there is a high chance of deterioration into: Complete failure  And eventually death.   The critical care that I am providing today is: High flow nasal cannula  The critical that has been undertaken is medically complex.   There has been no overlap in critical care time.   Critical Care Time not including procedures: 35 minutes

## 2025-07-29 NOTE — ED TRIAGE NOTES
"Chief Complaint   Patient presents with    Other     Vitals:    07/28/25 1842   BP: 115/70   Pulse: 85   Resp: 18   Temp: 36.8 °C (98.3 °F)   SpO2: 90%       Pt BIBA via [facility] unit 52. Pt's daughter states he has been \"acting weird\" x3 days. She states he has not been eating or taking his medications, as well as blood in his urine. She also mentioned he had a fall two weeks ago and has not been acting like his normal self since then.    Placed on continuous monitor, call bell within reach, fall precautions in place.   "

## 2025-07-29 NOTE — HOSPITAL COURSE
Sammy Yanez is a 89 y.o. male admitted 7/28/2025 with complaint of altered mental status and shortness of breath. 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 According to the patient's daughter, he was feeling confused for the past 5 days and it seemingly increased. He was exposed to COVID 19, for which his daughter tested positive 10 days ago. Upon arrival to the ER he required 5 L of oxygen.Chest xray was done that showed scattered hazy B/L pulmonary infiltrates  more on the left side. CT head was done that showed no acute intracranial abnormality.His pro calcitonin was elevated to 0.42.     The patient tested positive for COVID 1. He has no prior history of COVID pneumonia. His got his COVID vaccine in 2020 and did not get any booster. He is up to date on his pneumococcal vaccine.     The patient developed acute hypoxic respiratory failure and was put on 40 L on high flow nasal cannula. The patient was deteriorating so he was transferred to ICU

## 2025-07-30 ASSESSMENT — ENCOUNTER SYMPTOMS
MYALGIAS: 0
COUGH: 0
WEAKNESS: 0
DIZZINESS: 0
ABDOMINAL PAIN: 0
CONSTIPATION: 0
NAUSEA: 0
FALLS: 0
FEVER: 0
SPUTUM PRODUCTION: 0
HEADACHES: 0
DEPRESSION: 0
VOMITING: 0
PALPITATIONS: 0
DIARRHEA: 0
LOSS OF CONSCIOUSNESS: 0
SHORTNESS OF BREATH: 0
TINGLING: 0
STRIDOR: 0
CHILLS: 0

## 2025-07-30 ASSESSMENT — PAIN DESCRIPTION - PAIN TYPE
TYPE: ACUTE PAIN

## 2025-07-30 ASSESSMENT — FIBROSIS 4 INDEX: FIB4 SCORE: 2.22

## 2025-07-30 NOTE — ASSESSMENT & PLAN NOTE
Code Status was discussed with the patient's daughter and son and they decided to switch from a full code to DNR/DNI.

## 2025-07-30 NOTE — PROGRESS NOTES
Monitor Summary:  Rhythm: SR with 1st degree   Rate: 78-98  Ectopy: Frequent PVC's     .21/.08/.48

## 2025-07-30 NOTE — PROGRESS NOTES
Hospital Medicine Daily Progress Note    Date of Service  7/30/2025    Chief Complaint  Sammy Yanez is a 89 y.o. male admitted 7/28/2025 with altered mental status    Hospital Course  89-year-old male with a past medical history of COPD, hypertension, mood disorder presented with altered mental status.  Patient was also noted to have acute respiratory failure with hypoxia which has continued to worsen.  Patient has tested positive for COVID, also had an elevated procalcitonin and was placed on antibiotics.  Due to ongoing worsening, infectious disease was consulted.    Interval Problem Update  Patient continued to worsen so he was upgraded to the IMCU  Now on high flow nasal cannula  Patient is DNR  More responsive today    Overnight  No events  More alert  In and out of afib 80-90  SBP   Strict npo for speech eval  BM 7/25  Adequate uop  High flow 40L60%    I have discussed this patient's plan of care and discharge plan at IDT rounds today with Case Management, Nursing, Nursing leadership, and other members of the IDT team.    Consultants/Specialty  infectious disease    Code Status  DNAR/DNI    Disposition  The patient is not medically cleared for discharge to home or a post-acute facility.  Anticipate discharge to: skilled nursing facility    I have placed the appropriate orders for post-discharge needs.    Review of Systems  Review of Systems   Constitutional:  Positive for malaise/fatigue. Negative for chills and fever.   HENT:  Negative for congestion.    Respiratory:  Negative for cough, sputum production, shortness of breath and stridor.    Cardiovascular:  Negative for chest pain, palpitations and leg swelling.   Gastrointestinal:  Negative for abdominal pain, constipation, diarrhea, nausea and vomiting.   Genitourinary:  Negative for dysuria and urgency.   Musculoskeletal:  Negative for falls and myalgias.   Neurological:  Negative for dizziness, tingling, loss of consciousness, weakness and  headaches.   Psychiatric/Behavioral:  Negative for depression and suicidal ideas.    All other systems reviewed and are negative.       Physical Exam  Temp:  [36 °C (96.8 °F)-36.5 °C (97.7 °F)] 36.5 °C (97.7 °F)  Pulse:  [78-93] 88  Resp:  [18-30] 19  BP: ()/(53-77) 93/57  SpO2:  [88 %-98 %] 92 %    Physical Exam  Vitals and nursing note reviewed.   Constitutional:       General: He is not in acute distress.     Appearance: He is well-developed. He is ill-appearing. He is not diaphoretic.   HENT:      Head: Normocephalic and atraumatic.      Right Ear: External ear normal.      Left Ear: External ear normal.      Nose: No congestion or rhinorrhea.      Mouth/Throat:      Pharynx: Oropharynx is clear. No oropharyngeal exudate.   Eyes:      General: No scleral icterus.        Right eye: No discharge.         Left eye: No discharge.   Neck:      Trachea: No tracheal deviation.   Cardiovascular:      Rate and Rhythm: Normal rate and regular rhythm.      Heart sounds:      No friction rub. No gallop.   Pulmonary:      Effort: Pulmonary effort is normal. No respiratory distress.      Breath sounds: No stridor. Rales present. No wheezing.   Chest:      Chest wall: No tenderness.   Abdominal:      General: Bowel sounds are normal. There is no distension.      Palpations: Abdomen is soft.      Tenderness: There is no abdominal tenderness.   Musculoskeletal:         General: Normal range of motion.      Cervical back: Normal range of motion and neck supple. No edema or erythema.      Right lower leg: No edema.      Left lower leg: No edema.   Skin:     General: Skin is warm and dry.      Findings: No erythema or rash.   Neurological:      Comments: Some confusion but much more alert   Psychiatric:         Mood and Affect: Mood normal.         Behavior: Behavior normal.         Cognition and Memory: Cognition is impaired. Memory is impaired.         Fluids    Intake/Output Summary (Last 24 hours) at 7/30/2025 8674  Last  data filed at 7/30/2025 0400  Gross per 24 hour   Intake 924.41 ml   Output 650 ml   Net 274.41 ml        Laboratory  Recent Labs     07/28/25  1900 07/29/25  0838 07/30/25  0308   WBC 7.9 6.2 5.3   RBC 4.57* 3.86* 3.76*   HEMOGLOBIN 14.4 12.1* 11.9*   HEMATOCRIT 42.5 35.9* 36.2*   MCV 93.0 93.0 96.3   MCH 31.5 31.3 31.6   MCHC 33.9 33.7 32.9   RDW 51.8* 51.9* 55.0*   PLATELETCT 331 271 289   MPV 8.6* 8.2* 8.6*     Recent Labs     07/28/25  1900 07/29/25  0838 07/30/25  0308   SODIUM 138 138 146*   POTASSIUM 4.7 4.2 4.6   CHLORIDE 105 109 114*   CO2 17* 16* 18*   GLUCOSE 94 113* 125*   BUN 84* 59* 50*   CREATININE 1.31 1.02 1.04   CALCIUM 10.5 9.1 9.2                   Imaging  DX-CHEST-PORTABLE (1 VIEW)   Final Result         1.  Scattered hazy bilateral pulmonary infiltrates, greatest on the left, stable since prior study.         DX-CHEST-PORTABLE (1 VIEW)   Final Result         1.  Scattered hazy bilateral pulmonary infiltrates, greatest on the left.      CT-HEAD W/O   Final Result         1.  No acute intracranial abnormality is identified, there are nonspecific white matter changes, commonly associated with small vessel ischemic disease.  Associated mild cerebral atrophy is noted.   2.  Atherosclerosis.              Assessment/Plan  * Acute hypoxic respiratory failure (HCC)- (present on admission)  Assessment & Plan  Had been acutely worsening  Now requiring high flow nasal cannula  Procalcitonin was mildly elevated, continue Unasyn and azithromycin  COVID was positive, infectious disease is now following  Patient remains DNR  Is at significant risk of worsening and does require close monitoring in the IMCU  Continue high flow nasal cannula, wean as able  Continuous pulse ox monitoring    Patient is critically ill.   The patient continues to have: Acute respiratory failure with hypoxia  The vital organ system that is affected is the: Respiratory initially  If untreated there is a high chance of deterioration  into: Complete failure  And eventually death.   The critical care that I am providing today is: High flow nasal cannula  The critical that has been undertaken is medically complex.   There has been no overlap in critical care time.   Critical Care Time not including procedures: 36 minutes    Pneumonia due to COVID-19 virus  Assessment & Plan  Patient also with an elevated procalcitonin, continue Unasyn and azithromycin  Infectious diseases following  Due to the severity of his disease, ID is recommending baricitinib  Additional recommendations per infectious disease  Continue respiratory care per protocol  Continuous pulse ox monitoring  Causing acute respiratory failure with hypoxia  Await swallow evaluation, baricitinib not yet initiated    AF (atrial fibrillation) ([facility])  Assessment & Plan  Patient is unable to safely swallow  Held Eliquis and transitioned to full dose Lovenox  Restart Eliquis and metoprolol when able  Now appears sinus    Acute encephalopathy  Assessment & Plan  Likely secondary to an infectious source  Markedly improved today  Awaiting speech therapy for swallow evaluation    Elevated troponin  Assessment & Plan  Type II from atrial fibrillation and respiratory failure  No additional workup at this time    Advanced care planning/counseling discussion- (present on admission)  Assessment & Plan  Patient is now DNR    Hypothyroid- (present on admission)  Assessment & Plan  Continue Synthroid 25 mcg daily when able    Essential hypertension- (present on admission)  Assessment & Plan  Continue metoprolol and lisinopril when able  Continue as needed hydralazine  Adjust as needed          VTE prophylaxis:    therapeutic anticoagulation with weight-based lovenox BID, 1 mg/kg      I have performed a physical exam and reviewed and updated ROS and Plan today (7/30/2025). In review of yesterday's note (7/29/2025), there are no changes except as documented above.

## 2025-07-30 NOTE — CARE PLAN
Problem: Skin Integrity  Goal: Skin integrity is maintained or improved  Outcome: Progressing     Problem: Fall Risk  Goal: Patient will remain free from falls  Outcome: Progressing     Problem: Safety - Medical Restraint  Goal: Remains free of injury from restraints (Restraint for Interference with Medical Device)  Outcome: Progressing     Problem: Pain - Standard  Goal: Alleviation of pain or a reduction in pain to the patient’s comfort goal  Outcome: Progressing     Problem: Hemodynamics  Goal: Patient's hemodynamics, fluid balance and neurologic status will be stable or improve  Outcome: Progressing     Problem: Respiratory  Goal: Patient will achieve/maintain optimum respiratory ventilation and gas exchange  Outcome: Progressing     Problem: Risk for Aspiration  Goal: Patient's risk for aspiration will be absent or decrease  Outcome: Progressing   The patient is Stable - Low risk of patient condition declining or worsening    Shift Goals  Clinical Goals: Hemodynamically stable, safety and respiratory function  Patient Goals: MAXIM  Family Goals: updates on POC    Progress made toward(s) clinical / shift goals:  Hemodynamically stable. Saturating well on 40L/50% FIo2 on HFNC . No respiratory distress noted. Continue on right wrist soft restraint. Turn and repositioned Q 2 hrs. Failed swallow eval, Pending speech eval.     Patient is not progressing towards the following goals:

## 2025-07-30 NOTE — PROGRESS NOTES
Per report Pt failed swallow eval, and they put him on strict NPO but yet on the order his regular diet. Notified on call MD Mccarty and he's ok to put him on strict NPO. Order placed.

## 2025-07-30 NOTE — PROGRESS NOTES
Report received from Day RN. Assumed care. Continue on enhanced droplet isolation precaution. Family @ bedside involved with the care. Pt is [place] speaking only but family able to translate for him. Pt is alert to self only but he knows he's in the hospital. On right wrist restraint related pulling lines. Saturating well on 50L/80% HFNC . POC updated. Call light within reach. Bed alarm on.

## 2025-07-30 NOTE — THERAPY
"Speech Language Pathology   Clinical Swallow Evaluation     Patient Name:  Sammy Yanez  AGE:  89 y.o., SEX:  male  Medical Record #:  [id number]  Date of Service:  2025    Precautions  Medical: Altered Mentation  Swallowing: Swallow Precautions, NPO    History of Present Illness  89 y/o male admitted  with AMS and acute respiratory failure with worsening hypoxia. Tested positive for COVID.    CMHx: Acute respiratory failure, COVID PNA, afib  PMHx: COPD, HTN, mood disorder    No hx SLP in [facility]. Failed RN swallow screen.    CXR :  \"1.  Scattered hazy bilateral pulmonary infiltrates, greatest on the left, stable since prior study.\"    General Information:  Vitals  O2 (LPM): 40  O2 Delivery Device: Heated High Flow Nasal Cannula  Vitals Comments: 60% FiO2  Level of Consciousness: Alert, Awake  Patient Behaviors: Confused, Pleasant  Orientation: Self, , General place, Current month  Follows Directives: Yes    Prior Living Situation & Level of Function:  Housing / Facility: 1 Story Apartment / Condo  Lives with - Patient's Self Care Capacity: Adult Children  Swallowing: Patient and family report WFL - RG7/TN0 diet without complication at baseline    Oral Mechanism Evaluation:  Dentition: Fair, Natural dentition   Facial Symmetry: Equal  Facial Sensation: Equal     Labial Observations: WFL   Lingual Observations: Midline, Xerostomia  Motor Speech: WFL    Laryngeal Function:  Secretion Management: Adequate  Voice Quality: Hoarse  Cough: Perceptually weak      Subjective  Pt agreeable and cooperative with SLP evaluation tasks. He was eager for PO. Family at bedside supportive and translated from Japanese as needed. Family reported that patient had a choking episode yesterday where \"everything came out of his mouth and his face turned red.\"      Assessment  Current Method of Nutrition: NPO until cleared by speech pathology  Positioning: Kimbrough's (60-90 degrees)  Bolus Administration: SLP  O2 (LPM): 40 O2 " "Delivery Device: Heated High Flow Nasal Cannula  Factor(s) Affecting Performance: None  Tracheostomy : No    Swallowing Trials:  Ice: WFL  Thin Liquid (TN0): Impaired (Oral phase)      Comments:   No overt signs concerning for airway invasion throughout. Intermittent brief bolus hold with TN0. Pt required mod verbal cues from SLP and family to coordinate labial seal and sustain intra-oral pressure for straw sips. Explained rationale for FEES given respiratory failure and previously reported coughing/choking events with TN0. Patient and family verbalized agreement, although family did say that patient generally does not like invasive things and may have some difficulty with FEES scope being in situ.       Clinical Impressions  Pt is at elevated risk for oropharyngeal dysphagia given COVID PNA, respiratory failure with worsening hypoxia, and AMS. Pt would benefit from further evaluation of swallow using FEES prior to meaningful initiation of PO. Pt should have ice chips after oral care with assistance from trained staff/family to mitigate the impacts of xerostomia and disuse atrophy as well as to provide comfort and aid with secretion management.        Recommendations  NPO pending FEES  - Appropriate for limited ice chips after oral care under direct spv from staff / family  Medication: Non Oral vs. defer to D.O.  Supervision: Careful 1:1 hand feeding with ice chips  Oral Care: Q4h       SLP Treatment Plan  Treatment Plan: Dysphagia Treatment, Patient/Family/Caregiver Training  SLP Frequency: 3x Per Week  Estimated Duration: Until Therapy Goals Met    Anticipated Discharge Needs  Discharge Recommendations: Recommendations pending FEES    Patient / Family Goals  Patient / Family Goal #1: \"He is so happy to have water\"  Short Term Goals  Short Term Goal # 1: Pt will complete FEES w SLP to further evaluate swallow function and inform POC.      AMY Willis   "

## 2025-07-30 NOTE — THERAPY
"Speech Language Pathology   Flexible Endoscopic Evaluation of Swallowing (FEES)        Patient Name:  Sammy Yanez  AGE:  89 y.o., SEX:  male  Medical Record #:  [id number]  Date of Service:  7/30/2025    Precautions  Medical: Altered Mentation  Swallowing: Swallow Precautions    History of Present Illness  89 y/o male admitted 7/28 with AMS and acute respiratory failure with worsening hypoxia. Tested positive for COVID.    CMHx: Acute respiratory failure, COVID PNA, afib  PMHx: COPD, HTN, mood disorder    No hx SLP in [facility]. Failed RN swallow screen.    CXR 7/29:  \"1.  Scattered hazy bilateral pulmonary infiltrates, greatest on the left, stable since prior study.\"    Pertinent Information  Current Method of Nutrition: NPO until cleared by speech pathology  Patient Behaviors: Confused   Dentition: Fair, Natural dentition, Some missing dentition   Feeding Tube: None   Tracheostomy: No       Factor(s) Affecting Performance: Impaired mental status   Discussed the risks, benefits, and alternatives of the FEES procedure. Patient/family acknowledged and agreed to proceed.    Assessment  Flexible Endoscopic Evaluation of Swallowing (FEES) completed at bedside today. The endoscope was passed transnasally via Right nare to evaluate the anatomy and physiology of swallowing. Pt tolerated the procedure with no apparent distress.    Anatomic Findings: Cobblestoning of the BOT, some post-cricoid space edema- possibly c/w reflux    Vocal Fold Motion: Bilateral movement  Secretion Management: Adequate    Consistency PAS Score Timing Residue Comments   Thin Liquid 4 Pre Swallow Vallecular Residue: None (0%)  Pyriform Sinus Residue: Mild (5%-25%) Tsp, cup, straw   Mildly Thick 1 N/A Vallecular Residue: Mild (5%-25%)  Pyriform Sinus Residue: Mild (5%-25%) Cup & straw ; single & sequential   Liquidised 1 N/A Vallecular Residue: Mild (5%-25%)  Pyriform Sinus Residue: Mild (5%-25%)    Pudding 1 N/A Vallecular Residue: Mild " (5%-25%)  Pyriform Sinus Residue: Mild (5%-25%)    Soft & Bite Sized 1 N/A Vallecular Residue: Moderate (25%-50%)  Pyriform Sinus Residue: Mild (5%-25%)    Regular Solid 1 N/A Vallecular Residue: Moderate (25%-50%)  Pyriform Sinus Residue: Mild (5%-25%)    Mixed 1 N/A Vallecular Residue: Mild (5%-25%)  Pyriform Sinus Residue: Mild (5%-25%)      Penetration-Aspiration Scale (PAS)  1     No contrast enters airway  2     Contrast enters the airway, remains above the vocal folds, and is ejected from the airway (not seen in the airway at the end of the swallow).  3     Contrast enters the airway, remains above the vocal folds, and is not ejected from the airway (is seen in the airway after the swallow).  4     Contrast enters the airway, contacts the vocal folds, and is ejected from the airway.  5     Contrast enters the airway, contacts the vocal folds, and is not ejected from the airway  6     Contrast enters the airway, crosses the plane of the vocal folds, and is ejected from the airway.  7     Contrast enters the airway, crosses the plane of the vocal folds, and is not ejected from the airway despite effort.  8     Contrast enters the airway, crosses the plane of the vocal folds, is not ejected from the airway and there is no response to aspiration.    Oral phase: Posterior containment was fair. Mastication was prolonged and incomplete for RG7. Oral clearance was incomplete- mod lingual residue noted w/ RG7 trial which had to be cleared w/ a cued liquid sip.     Pharyngeal Phase: Initiation of the swallow was delayed, contributing to frequent PAS 4 before the swallow w/ TN0. Suspect this was largely r/t the pt's increased confusion/slowed response time (worsened this admission per family) and effects of HFNC. Base of tongue retraction was fair. Epiglottic movement was complete. Pharyngeal contraction was fair. Laryngeal vestibule closure was complete. PES opening was fair. Sensory integrity was presumed to be  intact, though no gross aspiration was visualized. Pt cleared penetration, suggestive of intact iSLN function.      Compensatory Strategies: The pt was unable to executive the 3-second prep strategy w/ liquids consistently (3 trials) d/t his mentation. He was able to complete the chin tuck w/ consistent cueing, and this appeared effective in reducing the quantity and depth of penetration before the swallow w/ TN0    Severity Rating:  Severity Rating: JUANA     JUANA: Mild    Clinical Impressions  - The pt p/w mild oropharyngeal  dysphagia, likely acute related to reduced swallow-respiratory coordination in the context of HFNC and COVID, superimposed on advanced age and debility. His oral phase is also impacted by reduced dentition.   - Swallow safety is mildly impaired ; swallow efficiency is impaired.   - Pt appears to be at moderate risk for aspiration PNA and moderate risk for malnutrition/dehydration. He is at increased risk for an adverse event from aspiration due to reduced respiratory function and reduced physical mobility. Recommend conservatively completing the chin tuck w/ TN0 d/t same.  - Swallow prognosis is excellent given an improvement in the pt's respiratory status; anticipate that the pt can discontinue the chin tuck once he transitions off of HFNC. ST to f/u addressing dysphagia, with possible diet advancement once the pt's status improves. Family at bedside was very involved and demo'd good v/u of all recommendations, and plan to provide feeding support.     Recommendations  Diet Consistency: Thin/all Liquids, Minced & Moist Solids  Medication: One pill at a time, Whole with puree, Cut large pills  Supervision: 1:1 feeding with constant supervision, Assist with meal tray set up  Positioning: Fully upright and midline during oral intake  Strategies: Small bites/sips, Slow rate of intake (Chin Tuck with Liquids)  Oral Care: BID  Additional Instrumentation: None    SLP Treatment Plan  Treatment Plan:  "Dysphagia Treatment  SLP Frequency: 3x Per Week  Estimated Duration: Until Therapy Goals Met    Anticipated Discharge Needs  Discharge Recommendations: Recommend home health for continued speech therapy services   Therapy Recommendations Upon DC: Dysphagia Training     Patient / Family Goals  Patient / Family Goal #1: \"He is so happy to have water\"  Goal #1 Outcome: Progressing as expected  Short Term Goal # 1: Pt will complete FEES w SLP to further evaluate swallow function and inform POC.  Goal Outcome # 1: Goal met  Short Term Goal # 2: Pt will tolerate a TN0/MM5 diet w/ no signs of aspiration related pulmonary complications  Short Term Goal # 3: Pt will demo timely mastication and complate oral clearance of semi solids and solids trials, as appropriate, to justify diet advancement    AMY Beckman  "

## 2025-07-30 NOTE — PROGRESS NOTES
Hospital Medicine Daily Progress Note    Date of Service  7/29/2025    Chief Complaint  Sammy Yanez is a 89 y.o. male admitted 7/28/2025 with altered mental status    Hospital Course  89-year-old male with a past medical history of COPD, hypertension, mood disorder presented with altered mental status.  Patient was also noted to have acute respiratory failure with hypoxia which has continued to worsen.  Patient has tested positive for COVID, also had an elevated procalcitonin and was placed on antibiotics.  Due to ongoing worsening, infectious disease was consulted.    Interval Problem Update  Patient continued to worsen so he was upgraded to the IMCU  Now on high flow nasal cannula  Patient is DNR    I have discussed this patient's plan of care and discharge plan at IDT rounds today with Case Management, Nursing, Nursing leadership, and other members of the IDT team.    Consultants/Specialty  infectious disease    Code Status  DNAR/DNI    Disposition  The patient is not medically cleared for discharge to home or a post-acute facility.  Anticipate discharge to: skilled nursing facility    I have placed the appropriate orders for post-discharge needs.    Review of Systems  Review of Systems   Unable to perform ROS: Acuity of condition        Physical Exam  Temp:  [36.4 °C (97.5 °F)-37 °C (98.6 °F)] 36.5 °C (97.7 °F)  Pulse:  [62-95] 84  Resp:  [15-30] 26  BP: ()/(40-76) 92/55  SpO2:  [84 %-98 %] 96 %    Physical Exam  Vitals and nursing note reviewed.   Constitutional:       General: He is not in acute distress.     Appearance: He is well-developed. He is ill-appearing. He is not toxic-appearing or diaphoretic.   HENT:      Head: Normocephalic and atraumatic.      Right Ear: External ear normal.      Left Ear: External ear normal.      Nose: Nose normal. No congestion or rhinorrhea.      Mouth/Throat:      Pharynx: No oropharyngeal exudate.   Eyes:      General:         Right eye: No discharge.         Left  eye: No discharge.   Neck:      Trachea: No tracheal deviation.   Cardiovascular:      Rate and Rhythm: Normal rate and regular rhythm.      Heart sounds: No murmur heard.     No friction rub. No gallop.   Pulmonary:      Effort: Pulmonary effort is normal. No respiratory distress.      Breath sounds: Normal breath sounds. No stridor. No wheezing or rales.   Abdominal:      General: Bowel sounds are normal. There is no distension.      Palpations: Abdomen is soft.   Musculoskeletal:      Cervical back: Normal range of motion and neck supple. No edema or erythema.      Right lower leg: No edema.      Left lower leg: No edema.   Lymphadenopathy:      Cervical: No cervical adenopathy.   Skin:     General: Skin is warm and dry.      Findings: No erythema or rash.   Neurological:      Comments: Somnolent, mostly nonverbal, confused         Fluids    Intake/Output Summary (Last 24 hours) at 7/29/2025 1745  Last data filed at 7/29/2025 1200  Gross per 24 hour   Intake 924.41 ml   Output 270 ml   Net 654.41 ml        Laboratory  Recent Labs     07/28/25  1900 07/29/25  0838   WBC 7.9 6.2   RBC 4.57* 3.86*   HEMOGLOBIN 14.4 12.1*   HEMATOCRIT 42.5 35.9*   MCV 93.0 93.0   MCH 31.5 31.3   MCHC 33.9 33.7   RDW 51.8* 51.9*   PLATELETCT 331 271   MPV 8.6* 8.2*     Recent Labs     07/28/25  1900 07/29/25  0838   SODIUM 138 138   POTASSIUM 4.7 4.2   CHLORIDE 105 109   CO2 17* 16*   GLUCOSE 94 113*   BUN 84* 59*   CREATININE 1.31 1.02   CALCIUM 10.5 9.1                   Imaging  DX-CHEST-PORTABLE (1 VIEW)   Final Result         1.  Scattered hazy bilateral pulmonary infiltrates, greatest on the left, stable since prior study.         DX-CHEST-PORTABLE (1 VIEW)   Final Result         1.  Scattered hazy bilateral pulmonary infiltrates, greatest on the left.      CT-HEAD W/O   Final Result         1.  No acute intracranial abnormality is identified, there are nonspecific white matter changes, commonly associated with small vessel  ischemic disease.  Associated mild cerebral atrophy is noted.   2.  Atherosclerosis.              Assessment/Plan  * Acute hypoxic respiratory failure (HCC)- (present on admission)  Assessment & Plan  Has been acutely worsening  Now requiring high flow nasal cannula  Procalcitonin was mildly elevated, continue Unasyn and azithromycin  COVID was positive, infectious disease is now following  Patient remains DNR  Is at significant risk of worsening and does require close monitoring in the IMCU    Patient is critically ill.   The patient continues to have: Acute respiratory failure with hypoxia  The vital organ system that is affected is the: Respiratory initially  If untreated there is a high chance of deterioration into: Complete failure  And eventually death.   The critical care that I am providing today is: High flow nasal cannula  The critical that has been undertaken is medically complex.   There has been no overlap in critical care time.   Critical Care Time not including procedures: 35 minutes    Pneumonia due to COVID-19 virus  Assessment & Plan  Patient also with an elevated procalcitonin, continue Unasyn and azithromycin  Infectious diseases following  Due to the severity of his disease, ID is recommending baricitinib  Additional recommendations per infectious disease  Continue respiratory care per protocol  Continuous pulse ox monitoring  Causing acute respiratory failure with hypoxia    AF (atrial fibrillation) (HCC)  Assessment & Plan  Patient is unable to safely swallow  Hold Eliquis and transition to full dose Lovenox  Restart Eliquis and metoprolol when able      Acute encephalopathy  Assessment & Plan  Likely secondary to an infectious source  Renal failure numbers, he has been altered for the last 5 to 10 days, but has been worse over the last 3 days per chart  CT head was obtained was negative for any acute intracranial abnormalities    Elevated troponin  Assessment & Plan  Type II from atrial  fibrillation and respiratory failure  No additional workup at this time      Advanced care planning/counseling discussion- (present on admission)  Assessment & Plan  Patient is now DNR    Hypothyroid- (present on admission)  Assessment & Plan  Continue Synthroid 25 mcg daily when able    Essential hypertension- (present on admission)  Assessment & Plan  Continue metoprolol and lisinopril when able  Continue as needed hydralazine  Adjust as needed         VTE prophylaxis:    therapeutic anticoagulation with weight-based lovenox BID, 1 mg/kg      I have performed a physical exam and reviewed and updated ROS and Plan today (7/29/2025). In review of yesterday's note (7/28/2025), there are no changes except as documented above.

## 2025-07-30 NOTE — ASSESSMENT & PLAN NOTE
-The patient's respiratory status was worsening with increased oxygen requirements upto 40 L on high flow nasal cannula. Therefore, ICU intensivist was consulted and the patient was transferred to [facility] fro higher level of care  --He was started on 6 mg IV steroid as well as scheduled duonebs.  -chest xray was repeated and it shows hazy bilateral infiltrates and it seems to worsen compared to his chest xray on 7/28/2025.

## 2025-07-30 NOTE — ASSESSMENT & PLAN NOTE
Patient also with an elevated procalcitonin, continue Unasyn and azithromycin  Infectious diseases following  Due to the severity of his disease, ID is recommending baricitinib  Additional recommendations per infectious disease  Continue respiratory care per protocol  Continuous pulse ox monitoring  Causing acute respiratory failure with hypoxia  Continue baricitinib now that he is safely swallowing

## 2025-07-31 ASSESSMENT — COGNITIVE AND FUNCTIONAL STATUS - GENERAL
EATING MEALS: A LITTLE
DRESSING REGULAR UPPER BODY CLOTHING: A LOT
HELP NEEDED FOR BATHING: A LOT
TOILETING: A LOT
DRESSING REGULAR LOWER BODY CLOTHING: A LOT
PERSONAL GROOMING: A LITTLE
SUGGESTED CMS G CODE MODIFIER DAILY ACTIVITY: CK
DAILY ACTIVITIY SCORE: 14

## 2025-07-31 ASSESSMENT — PAIN DESCRIPTION - PAIN TYPE
TYPE: ACUTE PAIN
TYPE: ACUTE PAIN

## 2025-07-31 ASSESSMENT — FIBROSIS 4 INDEX: FIB4 SCORE: 2.18

## 2025-07-31 NOTE — PROGRESS NOTES
Report received from Day RN. Assumed care. Received on his room alert  and awake, continue on enhanced droplet isolation precaution. Saturating well on 40L/60% FIO2 HFNC. Family at bedside involved with the care. POC updated. Call light within reach. Bed alarm on.

## 2025-07-31 NOTE — PROGRESS NOTES
"Pt's family approached this RN, they stated her Dad keeps taking off her oxygen and yelling it to them saying \"Take off all this cords, I don't want this and get me out of here \",Pt getting agitated. Notified on call MD Mccarty. New order received. Placed on bilateral soft wrist restraint.   "

## 2025-07-31 NOTE — PROGRESS NOTES
Pt still agitated and restless, pulled out his PIV, condom cath and cardiac monitor. Notified on call MD Stark, new order to placed bilateral hand mittens and Haldol IV.

## 2025-07-31 NOTE — THERAPY
"Occupational Therapy   Initial Evaluation     Patient Name:  Sammy Yanez  Age:  89 y.o., Sex:  male  Medical Record #:  [id number]  Today's Date:  7/31/2025     Precautions  Medical: Fall Risk, Altered Mentation  Swallowing: Swallow Precautions  Comments: HHFNC    Assessment  Patient is 89 y.o. male admitted 7/28 with AMS found to have COVID19, PNA, resp failure requiring HHFNC. He has  hx of COPD, HTN, mood disorder.    Pt is currently limited by weakness, fatigue, impaired balance, impaired cognition, impaired safety awareness, and impaired BUE function, which impacts ability to complete ADLs, ADL txfs, and functional mobility.    Plan    Occupational Therapy Initial Treatment Plan   Treatment Interventions: Self Care / Activities of Daily Living, Adaptive Equipment, Neuro Re-Education / Balance, Therapeutic Exercises, Therapeutic Activity, Family / Caregiver Training  Treatment Frequency: 3 Times per Week  Duration: Until Therapy Goals Met    DC Equipment Recommendations: Unable to determine at this time  Discharge Recommendations: Recommend post-acute placement for additional occupational therapy services prior to discharge home     Subjective    \"Why are you putting those on me?\" Referring to kim in Austrian     Objective       07/31/25 0917    Services   Is patient using  services for this encounter? Yes   Language Interpreted Moroccan    Name [id number]    Mode iPad   Content of Interpretation (select all) Patient Education;History/Visit information   Initial Contact Note    Initial Contact Note Order Received and Verified, Occupational Therapy Evaluation in Progress with Full Report to Follow.   Prior Living Situation   Comments Unable to determine at this time, pt only A&Oxself   Precautions   Medical Fall Risk;Altered Mentation   Swallowing Swallow Precautions   Comments FNC   Vitals   Vitals Comments 40L/60%; maintained o2 sats throughout   Pain 0 - 10 Group " "  Therapist Pain Assessment Nurse Notified;0   Cognition    Cognition / Consciousness X   Level of Consciousness Alert   Comments pleasant and cooperative, confused throughout and only oriented to self. had a difficult time understanding the  at times, he was confused as to why the  was pulled up despite saying \"no hablo english\". needs frequent reorientation. able to follow simple directions w/ mod cueing   Active ROM Upper Body   Active ROM Upper Body  X   Comments BUE grossly impaired more proximal than distal   Strength Upper Body   Upper Body Strength  X   Gross Strength Generalized Weakness, Equal Bilaterally.    Balance Assessment   Sitting Balance (Static) Fair   Sitting Balance (Dynamic) Fair -   Standing Balance (Static) Poor +   Standing Balance (Dynamic) Poor +   Weight Shift Sitting Fair   Weight Shift Standing Fair   Comments w/ B HHA   Bed Mobility    Supine to Sit Moderate Assist   Sit to Supine Moderate Assist   Scooting Moderate Assist   ADL Assessment   Grooming Minimal Assist;Seated   Lower Body Dressing Maximal Assist   Toileting Maximal Assist   How much help from another person does the patient currently need...   Putting on and taking off regular lower body clothing? 2   Bathing (including washing, rinsing, and drying)? 2   Toileting, which includes using a toilet, bedpan, or urinal? 2   Putting on and taking off regular upper body clothing? 2   Taking care of personal grooming such as brushing teeth? 3   Eating meals? 3   6 Clicks Daily Activity Score 14   Functional Mobility   Sit to Stand Moderate Assist  (Magdalene x2 for safety)   Mobility EOB>STS>BTB   Comments w/ B HHA; RN assisting   Short Term Goals   Short Term Goal # 1 pt will follow 1 step direction 100% of the time   Short Term Goal # 2 pt will demo ADL txfs w/ supv   Short Term Goal # 3 pt will dress LB w/ Magdalene                   "

## 2025-07-31 NOTE — PROGRESS NOTES
Hospital Medicine Daily Progress Note    Date of Service  7/31/2025    Chief Complaint  Sammy Yanez is a 89 y.o. male admitted 7/28/2025 with altered mental status    Hospital Course  89-year-old male with a past medical history of COPD, hypertension, mood disorder presented with altered mental status.  Patient was also noted to have acute respiratory failure with hypoxia which has continued to worsen.  Patient has tested positive for COVID, also had an elevated procalcitonin and was placed on antibiotics.  Due to ongoing worsening, infectious disease was consulted.    Patient continued to worsen so he was upgraded to the IMCU    Interval Problem Update  Now on high flow nasal cannula  Patient is DNR  Agitated at times    Overnight  More confused  Sinus tach with PVCs  No BM  Tolerating diet  HFNC 40L 60%    Overnight 7/30  No events  More alert  In and out of afib 80-90  SBP   Strict npo for speech eval  BM 7/25  Adequate uop  High flow 40L60%    I have discussed this patient's plan of care and discharge plan at IDT rounds today with Case Management, Nursing, Nursing leadership, and other members of the IDT team.    Consultants/Specialty  infectious disease    Code Status  DNAR/DNI    Disposition  The patient is not medically cleared for discharge to home or a post-acute facility.  Anticipate discharge to: skilled nursing facility    I have placed the appropriate orders for post-discharge needs.    Review of Systems  Review of Systems   Unable to perform ROS: Acuity of condition        Physical Exam  Temp:  [36.1 °C (97 °F)-37.1 °C (98.7 °F)] 37.1 °C (98.7 °F)  Pulse:  [] 81  Resp:  [14-29] 23  BP: ()/(50-74) 110/60  SpO2:  [88 %-96 %] 96 %    Physical Exam  Vitals and nursing note reviewed.   Constitutional:       General: He is not in acute distress.     Appearance: He is well-developed. He is ill-appearing.   HENT:      Head: Normocephalic and atraumatic.      Right Ear: External ear  normal.      Left Ear: External ear normal.      Nose: Nose normal. No congestion or rhinorrhea.      Mouth/Throat:      Pharynx: Oropharynx is clear. No oropharyngeal exudate.   Eyes:      General: No scleral icterus.        Right eye: No discharge.         Left eye: No discharge.      Conjunctiva/sclera: Conjunctivae normal.   Neck:      Trachea: No tracheal deviation.   Cardiovascular:      Rate and Rhythm: Normal rate. Rhythm irregular.      Heart sounds:      No friction rub.   Pulmonary:      Effort: Pulmonary effort is normal. No respiratory distress.      Breath sounds: No stridor. Rales present.   Abdominal:      General: Abdomen is flat. Bowel sounds are normal. There is no distension.      Palpations: Abdomen is soft.   Musculoskeletal:      Cervical back: Normal range of motion and neck supple. No edema or erythema.      Right lower leg: No edema.      Left lower leg: No edema.   Skin:     General: Skin is warm and dry.      Findings: No erythema or rash.   Neurological:      Comments: Somnolent, arousable but confused   Psychiatric:         Cognition and Memory: Cognition is impaired. Memory is impaired.         Fluids    Intake/Output Summary (Last 24 hours) at 7/31/2025 0801  Last data filed at 7/31/2025 0500  Gross per 24 hour   Intake 600 ml   Output 800 ml   Net -200 ml        Laboratory  Recent Labs     07/29/25  0838 07/30/25  0308 07/31/25  0255   WBC 6.2 5.3 6.8   RBC 3.86* 3.76* 3.60*   HEMOGLOBIN 12.1* 11.9* 11.4*   HEMATOCRIT 35.9* 36.2* 33.7*   MCV 93.0 96.3 93.6   MCH 31.3 31.6 31.7   MCHC 33.7 32.9 33.8   RDW 51.9* 55.0* 53.2*   PLATELETCT 271 289 295   MPV 8.2* 8.6* 8.3*     Recent Labs     07/29/25  0838 07/30/25  0308 07/31/25  0255   SODIUM 138 146* 142   POTASSIUM 4.2 4.6 3.5*   CHLORIDE 109 114* 110   CO2 16* 18* 19*   GLUCOSE 113* 125* 152*   BUN 59* 50* 40*   CREATININE 1.02 1.04 1.00   CALCIUM 9.1 9.2 8.8                   Imaging  DX-CHEST-PORTABLE (1 VIEW)   Final Result          1.  Scattered hazy bilateral pulmonary infiltrates, greatest on the left, stable since prior study.         DX-CHEST-PORTABLE (1 VIEW)   Final Result         1.  Scattered hazy bilateral pulmonary infiltrates, greatest on the left.      CT-HEAD W/O   Final Result         1.  No acute intracranial abnormality is identified, there are nonspecific white matter changes, commonly associated with small vessel ischemic disease.  Associated mild cerebral atrophy is noted.   2.  Atherosclerosis.              Assessment/Plan  * Acute hypoxic respiratory failure (HCC)- (present on admission)  Assessment & Plan  Had been acutely worsening  Now requiring high flow nasal cannula  Procalcitonin was mildly elevated, continue Unasyn and azithromycin  COVID was positive, infectious disease is now following  Patient remains DNR  Is at significant risk of worsening and does require close monitoring in the IMCU  Continue high flow nasal cannula, wean as able  Continuous pulse ox monitoring  Any attempted weaning and patient significantly desaturates    Patient is critically ill.   The patient continues to have: Acute respiratory failure with hypoxia  The vital organ system that is affected is the: Respiratory initially  If untreated there is a high chance of deterioration into: Complete failure  And eventually death.   The critical care that I am providing today is: High flow nasal cannula  The critical that has been undertaken is medically complex.   There has been no overlap in critical care time.   Critical Care Time not including procedures: 35 minutes    Pneumonia due to COVID-19 virus  Assessment & Plan  Patient also with an elevated procalcitonin, continue Unasyn and azithromycin  Infectious diseases following  Due to the severity of his disease, ID is recommending baricitinib  Additional recommendations per infectious disease  Continue respiratory care per protocol  Continuous pulse ox monitoring  Causing acute respiratory  failure with hypoxia  Continue baricitinib now that he is safely swallowing    AF (atrial fibrillation) ([facility])  Assessment & Plan  Appears sinus  Patient now able to swallow, resume home Eliquis and metoprolol    Acute encephalopathy  Assessment & Plan  Likely secondary to an infectious source  Agitated at times  Passes swallow evaluation    Elevated troponin  Assessment & Plan  Type II from atrial fibrillation and respiratory failure  No additional workup at this time    Advanced care planning/counseling discussion- (present on admission)  Assessment & Plan  Patient is now DNR    Hypothyroid- (present on admission)  Assessment & Plan  Continue Synthroid 25 mcg daily    Essential hypertension- (present on admission)  Assessment & Plan  Continue metoprolol and lisinopril   Continue as needed hydralazine  Adjust as needed          VTE prophylaxis:    therapeutic anticoagulation with eliquis 5 mg BID      I have performed a physical exam and reviewed and updated ROS and Plan today (7/31/2025). In review of yesterday's note (7/30/2025), there are no changes except as documented above.

## 2025-07-31 NOTE — PROGRESS NOTES
Continue present management   Received care of pt from NOC RN this am. Pt is A+O x 2, to self and place. Family at BS, updated family concerning plan of care

## 2025-07-31 NOTE — CARE PLAN
Problem: Bronchoconstriction  Goal: Improve in air movement and diminished wheezing  Description: Target End Date:  2 to 3 days    1.  Implement inhaled treatments  2.  Evaluate and manage medication effects  Outcome: Not Met  Duoneb QID     Problem: Humidified High Flow Nasal Cannula  Goal: Maintain adequate oxygenation dependent on patient condition  Description: Target End Date:  resolve prior to discharge or when underlying condition is resolved/stabilized    1.  Implement humidified high flow oxygen therapy  2.  Titrate high flow oxygen to maintain appropriate SpO2  Outcome: Not Met   30L/50%

## 2025-07-31 NOTE — DISCHARGE PLANNING
Case Management Discharge Planning    Admission Date: 7/28/2025  GMLOS: 4.6  ALOS: 3    6-Clicks ADL Score: 14  6-Clicks Mobility Score: 12  PT and/or OT Eval ordered: Yes  Post-acute Referrals Ordered: Yes  Post-acute Choice Obtained: No  Has referral(s) been sent to post-acute provider:  Yes      Anticipated Discharge Dispo: Discharge Disposition: Disch to a long term care facility (63)    DME Needed: No    Action(s) Taken: DC Assessment Complete (See below) and Referral(s) sent    Escalations Completed: Provider    Medically Clear: No    Next Steps: f/u with pt and medical team to discuss dc needs and barriers.    Barriers to Discharge: Medical clearance and Pending PT Evaluation    Is the patient up for discharge tomorrow: No        Care Transition Team Assessment    Pt is a 89 y.o male, admitted due to Acute Hypoxic respiratory failure. Pt is oriented to person only. RN ONEAL called and spoke to Pt's daughter Katharine to obtain assessment.  Pt lives with his son, Sharif in a second floor apartment with 13 steps to enter, no elevator in [facility]. At baseline, Pt is ambulatory and was independent  in all his ADL's . Pt has not had previous HH or home O2. Pt owns a cane but not using it. PCP is Jj Molina. Pt has a good family support. Payor is Medicare and Medicaid FFS.    Pending PT eval. Pt's daughter is agreeable for post acute placement if reccommended. Pt is on high flow O2 30lpm FiO@ 50%.   Referral sent to [facility] per protocol.     Information Source  Orientation Level: Oriented to person, Disoriented to place, Disoriented to time, Disoriented to situation  Information Given By: Other (Comments) (Daughter)  Informant's Name: Katharine Robb  Who is responsible for making decisions for patient? : Patient    Readmission Evaluation  Is this a readmission?: No    Elopement Risk  Legal Hold: No  Ambulatory or Self Mobile in Wheelchair: No-Not an Elopement Risk  Disoriented: Place-At Risk for Elopement, Time-At Risk  for Elopement, Situation-At Risk for Elopement  Psychiatric Symptoms: None  History of Wandering: No  Elopement this Admit: No  Vocalizing Wanting to Leave: No  Displays Behaviors, Body Language Wanting to Leave: No-Not at Risk for Elopement  Elopement Risk: Not at Risk for Elopement    Interdisciplinary Discharge Planning  Lives with - Patient's Self Care Capacity:  (Son)  Support Systems: Children, Friends / Neighbors  Housing / Facility: 1 Story Apartment / Condo (on 2nd floor)    Discharge Preparedness  What is your plan after discharge?: Uncertain - pending medical team collaboration  What are your discharge supports?: Child  Prior Functional Level: Ambulatory, Independent with Activities of Daily Living  Difficulity with ADLs: None  Difficulity with IADLs: None    Functional Assesment  Prior Functional Level: Ambulatory, Independent with Activities of Daily Living    Finances  Financial Barriers to Discharge: No  Prescription Coverage: Yes    Vision / Hearing Impairment  Vision Impairment : No  Hearing Impairment : Yes  Hearing Impairment: Hearing Device(s) Available (both ears)  Does Pt Need Special Equipment for the Hearing Impaired?: No         Advance Directive  Advance Directive?: None         Psychological Assessment  History of Substance Abuse: None    Discharge Risks or Barriers  Discharge risks or barriers?: Complex medical needs  Patient risk factors: Complex medical needs    Anticipated Discharge Information  Discharge Disposition: Disch to a long term care facility (63)

## 2025-07-31 NOTE — CARE PLAN
The patient is Stable - Low risk of patient condition declining or worsening    Shift Goals  Clinical Goals: pt to be more alert, hemodynamically stable  Patient Goals: MAXIM  Family Goals: Keeping family updated    Progress made toward(s) clinical / shift goals:  continues on HF, more alert. Family kept updated      Problem: Knowledge Deficit - Standard  Goal: Patient and family/care givers will demonstrate understanding of plan of care, disease process/condition, diagnostic tests and medications  Outcome: Progressing  Note: Family updated concerning POC     Problem: Skin Integrity  Goal: Skin integrity is maintained or improved  Outcome: Progressing  Note: ON A Q 2 HR TURN SCHEDULE WHILE IN BED TODAY

## 2025-07-31 NOTE — THERAPY
Physical Therapy   Education Note     Patient Name:  Sammy Yanez  Age:  89 y.o., Sex:  male  Medical Record #:  [id number]  Today's Date: 7/31/2025     PT eval attempted. Spoke with daughter and son regarding pt's PLOF and home set up, see below. Educated on PT role. Noted pt with multiple clear bouts of bigeminy and trigeminy on monitor, pt also briefly desatting to 86% Spo2 while son scooting him up in bed (HHFNC 30L/50% Fio2), Afib up to 122 at rest. Will hold and follow up as pt more appropriate for PT mobility eval.     Tariq Carroll PT, DPT      07/31/25 1317    Services   Is patient using  services for this encounter? Yes   Language Interpreted Maori    Name Pt's Daughter   Other  Mode family   Content of Interpretation (select all)   (PT eval)   Initial Contact Note    Initial Contact Note Order Received and Verified, Physical Therapy Evaluation in Progress with Full Report to Follow.   Precautions   Medical Altered Mentation;Fall Risk   Swallowing Swallow Precautions   Comments HHFNC, B wrist restraints, B mitts   Prior Living Situation   Housing / Facility 1 Story Apartment / Condo  (on 2nd floor)   Steps Into Home   (1 flight of stairs)   Steps In Home 0   Equipment Owned Single Point Cane  (can get FWW from care chest per daughter)   Lives with - Patient's Self Care Capacity   (Son)   Comments Son works daily and pt is alone during that time. Pt's daughter lives in area and able to come assist, works part time occasionally   Prior Level of Functional Mobility   Bed Mobility Independent   Transfer Status Independent   Ambulation Independent   Ambulation Distance   (household)   Assistive Devices Used None   Stairs Required Assist   History of Falls   History of Falls Yes   Date of Last Fall   (1 week ago)   Cognition    Cognition / Consciousness X   Level of Consciousness Alert   Safety Awareness Impaired;Impulsive   New Learning Impaired   Interdisciplinary Plan  of Care Collaboration   IDT Collaboration with  Nursing;Family / Caregiver   Patient Position at End of Therapy In Bed;Bed Alarm On;Wrist Restraints Applied;Family / Friend in Room  (handoff to RNTOAN)   Collaboration Comments PT eval attempted. Spoke with daughter and son regarding pt's PLOF and home set up. Educated on PT role. Noted pt with multiple clear bouts of bigeminy and trigeminy on monitor, pt also briefly desatting to 86% Spo2 while son scooting him up in bed (HHFNC 30L/50% Fio2), Afib up to 122 at rest. Will hold and follow up as pt more appropriate for PT mobility eval.   Session Information   Date / Session Number  7/31: edu only, EVAL; 7/29: hold

## 2025-07-31 NOTE — PROGRESS NOTES
Pt's Son and daughter notified this RN that they're really exhausted taking care of their Dad, they said pt keep yelling at them stating that they try to kill them. Advised Pt's children to go home so they can have rest, and will call them if it's emergency otherwise they will comeback in the morning. Contact number updated.

## 2025-08-01 ENCOUNTER — APPOINTMENT (OUTPATIENT)
Dept: RADIOLOGY | Facility: MEDICAL CENTER | Age: 89
DRG: 177 | End: 2025-08-01
Attending: INTERNAL MEDICINE
Payer: MEDICARE

## 2025-08-01 PROCEDURE — 71045 X-RAY EXAM CHEST 1 VIEW: CPT | Performed by: STUDENT IN AN ORGANIZED HEALTH CARE EDUCATION/TRAINING PROGRAM

## 2025-08-01 PROCEDURE — 71045 X-RAY EXAM CHEST 1 VIEW: CPT

## 2025-08-01 ASSESSMENT — PAIN DESCRIPTION - PAIN TYPE
TYPE: ACUTE PAIN

## 2025-08-01 NOTE — PROGRESS NOTES
Hospital Medicine Daily Progress Note    Date of Service  8/1/2025    Chief Complaint  Sammy Yanez is a 89 y.o. male admitted 7/28/2025 with altered mental status    Hospital Course  89-year-old male with a past medical history of COPD, hypertension, mood disorder presented with altered mental status.  Patient was also noted to have acute respiratory failure with hypoxia which has continued to worsen.  Patient has tested positive for COVID, also had an elevated procalcitonin and was placed on antibiotics.  Due to ongoing worsening, infectious disease was consulted.    Patient continued to worsen so he was upgraded to the [facility]    Interval Problem Update  Now on high flow nasal cannula  Patient is DNR  Agitated at times, confused frequently     Overnight  Confused  Afebrile  Afib   -150  Tolerating diet but poor intake  BM 7/31  Adequate uop  HFNC 40L70%    Overnight 7/31  More confused  Sinus tach with PVCs  No BM  Tolerating diet  HFNC 40L 60%    Overnight 7/30  No events  More alert  In and out of afib 80-90  SBP   Strict npo for speech eval  BM 7/25  Adequate uop  High flow 40L60%    I have discussed this patient's plan of care and discharge plan at IDT rounds today with Case Management, Nursing, Nursing leadership, and other members of the IDT team.    Consultants/Specialty  infectious disease    Code Status  DNAR/DNI    Disposition  The patient is not medically cleared for discharge to home or a post-acute facility.  Anticipate discharge to: skilled nursing facility    I have placed the appropriate orders for post-discharge needs.    Review of Systems  Review of Systems   Unable to perform ROS: Acuity of condition        Physical Exam  Temp:  [36.2 °C (97.2 °F)-37.1 °C (98.8 °F)] 36.2 °C (97.2 °F)  Pulse:  [] 84  Resp:  [16-39] 24  BP: (106-163)/(63-90) 157/85  SpO2:  [89 %-97 %] 95 %    Physical Exam  Vitals and nursing note reviewed.   Constitutional:       General: He is not in  acute distress.     Appearance: He is well-developed. He is ill-appearing. He is not diaphoretic.   HENT:      Head: Normocephalic and atraumatic.      Right Ear: External ear normal.      Left Ear: External ear normal.      Nose: No congestion or rhinorrhea.      Mouth/Throat:      Pharynx: Oropharynx is clear. No oropharyngeal exudate.   Eyes:      General: No scleral icterus.        Right eye: No discharge.         Left eye: No discharge.   Neck:      Trachea: No tracheal deviation.   Cardiovascular:      Rate and Rhythm: Normal rate. Rhythm irregular.   Pulmonary:      Effort: Pulmonary effort is normal. No respiratory distress.      Breath sounds: No stridor. Rales present. No wheezing.   Abdominal:      General: Bowel sounds are normal. There is no distension.      Palpations: Abdomen is soft.   Musculoskeletal:      Cervical back: Neck supple. No edema or erythema.      Right lower leg: No edema.      Left lower leg: No edema.   Skin:     General: Skin is warm and dry.      Findings: No erythema.   Neurological:      Comments: Somnolent, arousable but confused   Psychiatric:         Cognition and Memory: Cognition is impaired. Memory is impaired.         Fluids    Intake/Output Summary (Last 24 hours) at 8/1/2025 0811  Last data filed at 7/31/2025 1800  Gross per 24 hour   Intake 480 ml   Output 275 ml   Net 205 ml        Laboratory  Recent Labs     07/30/25  0308 07/31/25  0255 08/01/25  0320   WBC 5.3 6.8 6.7   RBC 3.76* 3.60* 3.87*   HEMOGLOBIN 11.9* 11.4* 12.3*   HEMATOCRIT 36.2* 33.7* 36.3*   MCV 96.3 93.6 93.8   MCH 31.6 31.7 31.8   MCHC 32.9 33.8 33.9   RDW 55.0* 53.2* 53.2*   PLATELETCT 289 295 328   MPV 8.6* 8.3* 8.3*     Recent Labs     07/30/25  0308 07/31/25  0255 08/01/25  0320   SODIUM 146* 142 143   POTASSIUM 4.6 3.5* 4.1   CHLORIDE 114* 110 112   CO2 18* 19* 17*   GLUCOSE 125* 152* 106*   BUN 50* 40* 34*   CREATININE 1.04 1.00 0.93   CALCIUM 9.2 8.8 8.8                    Imaging  DX-CHEST-PORTABLE (1 VIEW)   Final Result         1.  Scattered hazy bilateral pulmonary infiltrates, greatest on the left, stable since prior study.         DX-CHEST-PORTABLE (1 VIEW)   Final Result         1.  Scattered hazy bilateral pulmonary infiltrates, greatest on the left.      CT-HEAD W/O   Final Result         1.  No acute intracranial abnormality is identified, there are nonspecific white matter changes, commonly associated with small vessel ischemic disease.  Associated mild cerebral atrophy is noted.   2.  Atherosclerosis.              Assessment/Plan  * Acute hypoxic respiratory failure (HCC)- (present on admission)  Assessment & Plan  Had been acutely worsening  Now requiring high flow nasal cannula  Procalcitonin was mildly elevated, continue Unasyn and azithromycin  COVID was positive, infectious disease is now following  Patient remains DNR  Is at significant risk of worsening and does require close monitoring in the IMCU  Continue high flow nasal cannula, wean as able  Continuous pulse ox monitoring  Any attempted weaning and patient significantly desaturates  Requiring more oxygen via high flow nasal cannula, repeat chest x-ray    Patient is critically ill.   The patient continues to have: Acute respiratory failure with hypoxia  The vital organ system that is affected is the: Respiratory initially  If untreated there is a high chance of deterioration into: Complete failure  And eventually death.   The critical care that I am providing today is: High flow nasal cannula  The critical that has been undertaken is medically complex.   There has been no overlap in critical care time.   Critical Care Time not including procedures: 36 minutes    Pneumonia due to COVID-19 virus  Assessment & Plan  Patient also with an elevated procalcitonin, continue Unasyn and azithromycin  Infectious diseases following  Due to the severity of his disease, ID is recommending baricitinib  Additional  recommendations per infectious disease  Continue respiratory care per protocol  Continuous pulse ox monitoring  Causing acute respiratory failure with hypoxia  Continue baricitinib now that he is safely swallowing  TB testing pending    AF (atrial fibrillation) ([facility])  Assessment & Plan  Appears sinus  Patient now able to swallow, resume home Eliquis and metoprolol    Acute encephalopathy  Assessment & Plan  Likely secondary to an infectious source  Agitated at times  Passed swallow evaluation    Elevated troponin  Assessment & Plan  Type II from atrial fibrillation and respiratory failure  No additional workup at this time    Advanced care planning/counseling discussion- (present on admission)  Assessment & Plan  Patient is now DNR    Hypothyroid- (present on admission)  Assessment & Plan  Continue Synthroid 25 mcg daily    Essential hypertension- (present on admission)  Assessment & Plan  Continue metoprolol and lisinopril   Continue as needed hydralazine  Adjust as needed          VTE prophylaxis: VTE Selection    I have performed a physical exam and reviewed and updated ROS and Plan today (8/1/2025). In review of yesterday's note (7/31/2025), there are no changes except as documented above.

## 2025-08-01 NOTE — CARE PLAN
The patient is Stable - Low risk of patient condition declining or worsening    Shift Goals  Clinical Goals: wean oxygen, monitor on new medications, patient safety  Patient Goals: scratch nose, go home  Family Goals: updates on POC    Progress made toward(s) clinical / shift goals:    Problem: Safety - Medical Restraint  Goal: Remains free of injury from restraints (Restraint for Interference with Medical Device)  Outcome: Progressing       Patient is not progressing towards the following goals:

## 2025-08-01 NOTE — CARE PLAN
The patient is Watcher - Medium risk of patient condition declining or worsening    Shift Goals  Clinical Goals: Wean o2, increased mental status, mobility, safety, increased PO intake  Patient Goals: MAXIM  Family Goals: MAXIM    Progress made toward(s) clinical / shift goals:      Problem: Knowledge Deficit - Standard  Goal: Patient and family/care givers will demonstrate understanding of plan of care, disease process/condition, diagnostic tests and medications  Description: Target End Date:  1-3 days or as soon as patient condition allows    Document in Patient Education    1.  Patient and family/caregiver oriented to unit, equipment, visitation policy and means for communicating concern  2.  Complete/review Learning Assessment  3.  Assess knowledge level of disease process/condition, treatment plan, diagnostic tests and medications  4.  Explain disease process/condition, treatment plan, diagnostic tests and medications  Outcome: Progressing     Problem: Skin Integrity  Goal: Skin integrity is maintained or improved  Description: Target End Date:  Prior to discharge or change in level of care    Document interventions on Skin Risk/Robert flowsheet groups and corresponding LDA    1.  Assess and monitor skin integrity, appearance and/or temperature  2.  Assess risk factors for impaired skin integrity and/or pressures ulcers  3.  Implement precautions to protect skin integrity in collaboration with interdisciplinary team  4.  Implement pressure ulcer prevention protocol if at risk for skin breakdown  5.  Confirm wound care consult if at risk for skin breakdown  6.  Ensure patient use of pressure relieving devices  (Low air loss bed, waffle overlay, heel protectors, ROHO cushion, etc)  Outcome: Progressing     Problem: Fall Risk  Goal: Patient will remain free from falls  Description: Target End Date:  Prior to discharge or change in level of care    Document interventions on the Emilia Holm Fall Risk Assessment    1.   Assess for fall risk factors  2.  Implement fall precautions  Outcome: Progressing     Problem: Safety - Medical Restraint  Goal: Remains free of injury from restraints (Restraint for Interference with Medical Device)  Description: INTERVENTIONS:  1. Determine that other, less restrictive measures have been tried or would not be effective before applying the restraint  2. Evaluate the patient's condition at the time of restraint application  3. Educate patient/family regarding the reason for restraint  4. Q2H: Monitor safety, psychosocial status, comfort, circulation, respiratory status, LOC, nutrition and hydration  Outcome: Progressing  Goal: Free from restraint(s) (Restraint for Interference with Medical Device)  Description: INTERVENTIONS:  1.  ONCE/SHIFT or MINIMUM Q12H: Assess and document the continuing need for restraints  2.  Q24H: Continued use of restraint requires LIP to perform face to face examination and written order  3.  Identify and implement measures to help patient regain control  4.  Educate patient/family on discontinuation criteria   5.  Assess patient's understanding and retention of education provided  6.  Assess readiness for release & initiate progressive release per protocol  7.  Identify and document criteria for restraints  Outcome: Progressing     Problem: Pain - Standard  Goal: Alleviation of pain or a reduction in pain to the patient’s comfort goal  Description: Target End Date:  Prior to discharge or change in level of care    Document on Vitals flowsheet    1.  Document pain using the appropriate pain scale per order or unit policy  2.  Educate and implement non-pharmacologic comfort measures (i.e. relaxation, distraction, massage, cold/heat therapy, etc.)  3.  Pain management medications as ordered  4.  Reassess pain after pain med administration per policy  5.  If opiods administered assess patient's response to pain medication is appropriate per POSS sedation scale  6.  Follow  pain management plan developed in collaboration with patient and interdisciplinary team (including palliative care or pain specialists if applicable)  Outcome: Progressing     Problem: Psychosocial  Goal: Patient's level of anxiety will decrease  Description: Target End Date:  1-3 days or as soon as patient condition allows    1.  Collaborate with patient and family/caregiver to identify triggers and develop strategies to cope with anxiety  2.  Implement stimuli reduction, calming techniques  3.  Pharmacologic management per provider order  4.  Encourage patient/family/care giver participation  5.  Collaborate with interdisciplinary team including Psychologist or Behavioral Health Team as needed  Outcome: Progressing     Problem: Hemodynamics  Goal: Patient's hemodynamics, fluid balance and neurologic status will be stable or improve  Description: Target End Date:  Prior to discharge or change in level of care    Document on Assessment and I/O flowsheet templates    1.  Monitor vital signs, pulse oximetry and cardiac monitor per provider order and/or policy  2.  Maintain blood pressure per provider order  3.  Hemodynamic monitoring per provider order  4.  Manage IV fluids and IV infusions  5.  Monitor intake and output  6.  Daily weights per unit policy or provider order  7.  Assess peripheral pulses and capillary refill  8.  Assess color and body temperature  9.  Position patient for maximum circulation/cardiac output  10. Monitor for signs/symptoms of excessive bleeding  11. Assess mental status, restlessness and changes in level of consciousness  12. Monitor temperature and report fever or hypothermia to provider immediately. Consideration of targeted temperature management.  Outcome: Progressing     Problem: Respiratory  Goal: Patient will achieve/maintain optimum respiratory ventilation and gas exchange  Description: Target End Date:  Prior to discharge or change in level of care    Document on Assessment  flowsheet    1.  Assess and monitor rate, rhythm, depth and effort of respiration  2.  Breath sounds assessed qshift and/or as needed  3.  Assess O2 saturation, administer/titrate oxygen as ordered  4.  Position patient for maximum ventilatory efficiency  5.  Turn, cough, and deep breath with splinting to improve effectiveness  6.  Collaborate with RT to administer medication/treatments per order  7.  Encourage use of incentive spirometer and encourage patient to cough after use and utilize splinting techniques if applicable  8.  Airway suctioning  9.  Monitor sputum production for changes in color, consistency and frequency  10. Perform frequent oral hygiene  11. Alternate physical activity with rest periods  Outcome: Progressing     Problem: Urinary Elimination  Goal: Establish and maintain regular urinary output  Description: Target End Date:  Prior to discharge or change in level of care    Document on I/O and Assessment flowsheets    1.  Evaluate need to continue indwelling catheter every shift  2.  Assess signs and symptoms of urinary retention  3.  Assess post-void residual volumes  4.  Implement bladder training program  5.  Encourage scheduled voidings  6.  Assist patient to sit on bedside commode or toilet for voiding  7.  Educate patient and family/caregiver on use and purpose of urine collection devices (document in Patient Education)  Outcome: Progressing       Patient is not progressing towards the following goals:

## 2025-08-01 NOTE — CARE PLAN
Problem: Humidified High Flow Nasal Cannula  Goal: Maintain adequate oxygenation dependent on patient condition  Description: Target End Date:  resolve prior to discharge or when underlying condition is resolved/stabilized    1.  Implement humidified high flow oxygen therapy  2.  Titrate high flow oxygen to maintain appropriate SpO2  Outcome: Not Met       Respiratory Update    Treatment modality: hhfnc 40L/70%  Frequency: q4    Pt tolerating current treatments well with no adverse reactions.   Problem: Bronchoconstriction  Goal: Improve in air movement and diminished wheezing  Description: Target End Date:  2 to 3 days    1.  Implement inhaled treatments  2.  Evaluate and manage medication effects  Outcome: Progressing       Respiratory Update    Treatment modality: duo  Frequency: qid    Pt tolerating current treatments well with no adverse reactions.

## 2025-08-01 NOTE — CARE PLAN
The patient is Watcher - Medium risk of patient condition declining or worsening    Shift Goals  Clinical Goals: wean O2, monitor respiatory status, monitor labs, pt's safety  Patient Goals: comfort, have family at bedside  Family Goals: updates, support    Progress made toward(s) clinical / shift goals: Plan of care and medications discussed and reviewed over with pt's family. They verbalized understanding. Skin assessed per shift. All fall precautions in place. Bed alarm on. Pt hemodynamically stable. Pt been having increase of fiO2 needs. Encouraging po intake. Monitoring urine output    Patient is not progressing towards the following goals: Pt is confused, agitated and on restraints. No evidence of learning and pt placed back on restraints.       Problem: Knowledge Deficit - Standard  Goal: Patient and family/care givers will demonstrate understanding of plan of care, disease process/condition, diagnostic tests and medications  Outcome: Progressing     Problem: Skin Integrity  Goal: Skin integrity is maintained or improved  Outcome: Progressing     Problem: Fall Risk  Goal: Patient will remain free from falls  Outcome: Progressing     Problem: Safety - Medical Restraint  Goal: Remains free of injury from restraints (Restraint for Interference with Medical Device)  Outcome: Progressing  Goal: Free from restraint(s) (Restraint for Interference with Medical Device)  Outcome: Progressing     Problem: Psychosocial  Goal: Patient's level of anxiety will decrease  Outcome: Progressing  Goal: Patient's ability to verbalize feelings about condition will improve  Outcome: Progressing     Problem: Hemodynamics  Goal: Patient's hemodynamics, fluid balance and neurologic status will be stable or improve  Outcome: Progressing     Problem: Respiratory  Goal: Patient will achieve/maintain optimum respiratory ventilation and gas exchange  Outcome: Progressing     Problem: Risk for Aspiration  Goal: Patient's risk for aspiration  will be absent or decrease  Outcome: Progressing     Problem: Nutrition  Goal: Patient's nutritional and fluid intake will be adequate or improve  Outcome: Progressing     Problem: Urinary Elimination  Goal: Establish and maintain regular urinary output  Outcome: Progressing

## 2025-08-02 ASSESSMENT — PAIN DESCRIPTION - PAIN TYPE
TYPE: ACUTE PAIN

## 2025-08-02 NOTE — PROGRESS NOTES
Hospital Medicine Daily Progress Note    Date of Service  8/2/2025    Chief Complaint  Sammy Yanez is a 89 y.o. male admitted 7/28/2025 with altered mental status    Hospital Course  89-year-old male with a past medical history of COPD, hypertension, mood disorder presented with altered mental status.  Patient was also noted to have acute respiratory failure with hypoxia which has continued to worsen.  Patient has tested positive for COVID, also had an elevated procalcitonin and was placed on antibiotics.  Due to ongoing worsening, infectious disease was consulted.    Patient continued to worsen so he was upgraded to the [facility]    Interval Problem Update  Now on high flow nasal cannula  Patient is DNR  Agitated at times, confused frequently     Overnight  Oriented to self and place  Confused and restless  Following commands  Tmax 98.7  NSR and afib  -160  Tolerating diet  BM yesterday  Adequate uop  HFNC 20L 40%    Overnight 8/1  Confused  Afebrile  Afib   -150  Tolerating diet but poor intake  BM 7/31  Adequate uop  HFNC 40L70%    Overnight 7/31  More confused  Sinus tach with PVCs  No BM  Tolerating diet  HFNC 40L 60%    Overnight 7/30  No events  More alert  In and out of afib 80-90  SBP   Strict npo for speech eval  BM 7/25  Adequate uop  High flow 40L60%    I have discussed this patient's plan of care and discharge plan at IDT rounds today with Case Management, Nursing, Nursing leadership, and other members of the IDT team.    Consultants/Specialty  infectious disease    Code Status  DNAR/DNI    Disposition  The patient is not medically cleared for discharge to home or a post-acute facility.  Anticipate discharge to: skilled nursing facility    I have placed the appropriate orders for post-discharge needs.    Review of Systems  Review of Systems   Unable to perform ROS: Acuity of condition        Physical Exam  Temp:  [36.1 °C (96.9 °F)-36.5 °C (97.7 °F)] 36.5 °C (97.7 °F)  Pulse:   [] 80  Resp:  [12-43] 19  BP: (100-167)/() 155/79  SpO2:  [87 %-97 %] 91 %    Physical Exam  Vitals and nursing note reviewed.   Constitutional:       General: He is not in acute distress.     Appearance: He is well-developed. He is ill-appearing.   HENT:      Head: Normocephalic and atraumatic.      Right Ear: External ear normal.      Left Ear: External ear normal.      Nose: Nose normal. No congestion or rhinorrhea.      Mouth/Throat:      Pharynx: No oropharyngeal exudate or posterior oropharyngeal erythema.   Eyes:      General:         Right eye: No discharge.         Left eye: No discharge.   Neck:      Trachea: No tracheal deviation.   Cardiovascular:      Rate and Rhythm: Normal rate. Rhythm irregular.      Heart sounds:      No gallop.   Pulmonary:      Effort: Pulmonary effort is normal. No respiratory distress.      Breath sounds: Rales present. No wheezing.   Abdominal:      General: Abdomen is flat. Bowel sounds are normal. There is no distension.      Palpations: Abdomen is soft.   Musculoskeletal:      Cervical back: Neck supple. No edema or erythema.      Right lower leg: No edema.      Left lower leg: No edema.   Lymphadenopathy:      Cervical: No cervical adenopathy.   Skin:     General: Skin is warm and dry.   Neurological:      Comments: Somnolent, arousable but confused   Psychiatric:         Cognition and Memory: Cognition is impaired. Memory is impaired.         Fluids    Intake/Output Summary (Last 24 hours) at 8/2/2025 0759  Last data filed at 8/2/2025 0500  Gross per 24 hour   Intake 100 ml   Output 1050 ml   Net -950 ml        Laboratory  Recent Labs     07/31/25  0255 08/01/25  0320 08/02/25  0210   WBC 6.8 6.7 6.2   RBC 3.60* 3.87* 3.65*   HEMOGLOBIN 11.4* 12.3* 11.5*   HEMATOCRIT 33.7* 36.3* 33.6*   MCV 93.6 93.8 92.1   MCH 31.7 31.8 31.5   MCHC 33.8 33.9 34.2   RDW 53.2* 53.2* 51.9*   PLATELETCT 295 328 295   MPV 8.3* 8.3* 8.6*     Recent Labs     07/31/25  0255  08/01/25  0320 08/02/25  0210   SODIUM 142 143 143   POTASSIUM 3.5* 4.1 4.0   CHLORIDE 110 112 113*   CO2 19* 17* 19*   GLUCOSE 152* 106* 112*   BUN 40* 34* 29*   CREATININE 1.00 0.93 0.91   CALCIUM 8.8 8.8 8.5                   Imaging  DX-CHEST-LIMITED (1 VIEW)   Final Result         1.  Essentially unchanged chest radiograph with left greater than right bibasilar opacities.   2.  Likely bilateral small left greater greater than right pleural effusions.      DX-CHEST-PORTABLE (1 VIEW)   Final Result         1.  Scattered hazy bilateral pulmonary infiltrates, greatest on the left, stable since prior study.         DX-CHEST-PORTABLE (1 VIEW)   Final Result         1.  Scattered hazy bilateral pulmonary infiltrates, greatest on the left.      CT-HEAD W/O   Final Result         1.  No acute intracranial abnormality is identified, there are nonspecific white matter changes, commonly associated with small vessel ischemic disease.  Associated mild cerebral atrophy is noted.   2.  Atherosclerosis.              Assessment/Plan  * Acute hypoxic respiratory failure (HCC)- (present on admission)  Assessment & Plan  Had been acutely worsening  Now requiring high flow nasal cannula  Procalcitonin was mildly elevated, continue Unasyn and azithromycin  COVID was positive, infectious disease is now following  Patient remains DNR  Is at significant risk of worsening and does require close monitoring in the IMCU  Continue high flow nasal cannula, wean as able  Continuous pulse ox monitoring  Any attempted weaning and patient significantly desaturates  Required more oxygen via high flow nasal cannula, no significant change on chest x-ray  Difficult to wean off high flow nasal cannula    Patient is critically ill.   The patient continues to have: Acute respiratory failure with hypoxia  The vital organ system that is affected is the: Respiratory initially  If untreated there is a high chance of deterioration into: Complete failure  And  eventually death.   The critical care that I am providing today is: High flow nasal cannula  The critical that has been undertaken is medically complex.   There has been no overlap in critical care time.   Critical Care Time not including procedures: 35 minutes    Pneumonia due to COVID-19 virus  Assessment & Plan  Patient also with an elevated procalcitonin, continue Unasyn and azithromycin  Infectious diseases following  Due to the severity of his disease, ID is recommending baricitinib  Additional recommendations per infectious disease  Continue respiratory care per protocol  Continuous pulse ox monitoring  Causing acute respiratory failure with hypoxia  Continue baricitinib now that he is safely swallowing  TB testing pending  Continues to require high flow nasal cannula    AF (atrial fibrillation) ([facility])  Assessment & Plan  Appears sinus  Patient now able to swallow, resume home Eliquis and metoprolol    Acute encephalopathy  Assessment & Plan  Likely secondary to an infectious source  Agitated at times  Passed swallow evaluation    Elevated troponin  Assessment & Plan  Type II from atrial fibrillation and respiratory failure  No additional workup at this time    Advanced care planning/counseling discussion- (present on admission)  Assessment & Plan  Patient is now DNR    Hypothyroid- (present on admission)  Assessment & Plan  Continue Synthroid 25 mcg daily    Essential hypertension- (present on admission)  Assessment & Plan  Continue metoprolol and lisinopril   Continue as needed hydralazine  Adjust as needed          VTE prophylaxis:    therapeutic anticoagulation with eliquis 5 mg BID      I have performed a physical exam and reviewed and updated ROS and Plan today (8/2/2025). In review of yesterday's note (8/1/2025), there are no changes except as documented above.

## 2025-08-02 NOTE — CARE PLAN
The patient is Watcher - Medium risk of patient condition declining or worsening    Shift Goals  Clinical Goals: wean O2, encourage repositioning and turning, monitor skin integrity, frequent reorientation, safety  Patient Goals: MAXIM  Family Goals: update on POC, for patient to rest and feel better    Progress made toward(s) clinical / shift goals: reviewed POC with family, patient is alerted and oriented to self, frequent re-orientation provided, q2hr repositioning and turns in place, safety and fall precautions in place, q4hr pain assessments. Adequate urine output. Weaned O2 from HFNC to 13L oxymask, patient tolerating well.       Problem: Safety - Medical Restraint  Goal: Remains free of injury from restraints (Restraint for Interference with Medical Device)  Outcome: Progressing     New orders for bilateral wrist and mitt restraints in place at 0334, patient removing oxygen and pulling out lines and IV. Family at bedside and agreed to restraints before leaving. Family will return in AM. PRN Haldol given prior, patient still agitated and restless. Q2hr restraint assessments in place.      Problem: Knowledge Deficit - Standard  Goal: Patient and family/care givers will demonstrate understanding of plan of care, disease process/condition, diagnostic tests and medications  Outcome: Progressing     Problem: Psychosocial  Goal: Patient's level of anxiety will decrease  Outcome: Progressing     Problem: Fall Risk  Goal: Patient will remain free from falls  Outcome: Progressing     Problem: Respiratory  Goal: Patient will achieve/maintain optimum respiratory ventilation and gas exchange  Outcome: Progressing     Problem: Hemodynamics  Goal: Patient's hemodynamics, fluid balance and neurologic status will be stable or improve  Outcome: Progressing     Problem: Urinary Elimination  Goal: Establish and maintain regular urinary output  Outcome: Progressing

## 2025-08-02 NOTE — CARE PLAN
The patient is Watcher - Medium risk of patient condition declining or worsening    Shift Goals  Clinical Goals: wean O2, improve mentation  Patient Goals: get out of hospital  Family Goals: MAXIM    Problem: Safety - Medical Restraint  Goal: Remains free of injury from restraints (Restraint for Interference with Medical Device)  Outcome: Progressing  Note: Removed and performed ROM q2h. No s/sx of injury.      Problem: Pain - Standard  Goal: Alleviation of pain or a reduction in pain to the patient’s comfort goal  Outcome: Progressing  Note: Assessed q2h and PRN. Pt denies pain this shift.      Progress made toward(s) clinical / shift goals:  See above.

## 2025-08-02 NOTE — PROGRESS NOTES
Report received from KARUNA Gotti. Assumed patient care. Patient on enhanced droplet and contact isolation precaution. Isolation and safety precautions in place. Wife and son at bedside involved with the care. Pt is Swedish speaking only but family able to translate for him.  at bedside as well. Pt is alert and oriented to self. POC reviewed w/ family, questions and concerns answered at this time. Patient on HFNC, 40L/40%. Call light and belongings within reach. Bed alarm on.

## 2025-08-03 ASSESSMENT — PAIN DESCRIPTION - PAIN TYPE
TYPE: ACUTE PAIN

## 2025-08-03 ASSESSMENT — FIBROSIS 4 INDEX: FIB4 SCORE: 2.18

## 2025-08-03 NOTE — PROGRESS NOTES
Hospital Medicine Daily Progress Note    Date of Service  8/3/2025    Chief Complaint  Sammy Yanez is a 89 y.o. male admitted 7/28/2025 with altered mental status    Hospital Course  89-year-old male with a past medical history of COPD, hypertension, mood disorder presented with altered mental status.  Patient was also noted to have acute respiratory failure with hypoxia which has continued to worsen.  Patient has tested positive for COVID, also had an elevated procalcitonin and was placed on antibiotics.  Due to ongoing worsening, infectious disease was consulted.    Patient continued to worsen so he was upgraded to the [facility]    Interval Problem Update  Now on high flow nasal cannula  Patient is DNR  Agitated at times, confused frequently     Overnight  Combative, restrained and given haldol  No pain  Afebrile  Sinus arrhythmia  -150  Tolerating diet  BM yesterday  Adequate uop  HFNC 30L 40%    Overnight 8/2  Oriented to self and place  Confused and restless  Following commands  Tmax 98.7  NSR and afib  -160  Tolerating diet  BM yesterday  Adequate uop  HFNC 20L 40%    Overnight 8/1  Confused  Afebrile  Afib   -150  Tolerating diet but poor intake  BM 7/31  Adequate uop  HFNC 40L70%    Overnight 7/31  More confused  Sinus tach with PVCs  No BM  Tolerating diet  HFNC 40L 60%    Overnight 7/30  No events  More alert  In and out of afib 80-90  SBP   Strict npo for speech eval  BM 7/25  Adequate uop  High flow 40L60%    I have discussed this patient's plan of care and discharge plan at IDT rounds today with Case Management, Nursing, Nursing leadership, and other members of the IDT team.    Consultants/Specialty  infectious disease    Code Status  DNAR/DNI    Disposition  The patient is not medically cleared for discharge to home or a post-acute facility.  Anticipate discharge to: skilled nursing facility    I have placed the appropriate orders for post-discharge needs.    Review of  Systems  Review of Systems   Unable to perform ROS: Acuity of condition        Physical Exam  Temp:  [35.9 °C (96.7 °F)-36.9 °C (98.4 °F)] 36.9 °C (98.4 °F)  Pulse:  [76-99] 82  Resp:  [16-25] 20  BP: (103-157)/(60-82) 156/74  SpO2:  [89 %-99 %] 96 %    Physical Exam  Vitals and nursing note reviewed.   Constitutional:       General: He is not in acute distress.     Appearance: He is well-developed. He is ill-appearing. He is not diaphoretic.   HENT:      Head: Normocephalic and atraumatic.      Right Ear: External ear normal.      Left Ear: External ear normal.      Nose: No congestion or rhinorrhea.      Mouth/Throat:      Pharynx: Oropharynx is clear. No oropharyngeal exudate or posterior oropharyngeal erythema.   Eyes:      General: No scleral icterus.        Right eye: No discharge.         Left eye: No discharge.   Neck:      Trachea: No tracheal deviation.   Cardiovascular:      Rate and Rhythm: Normal rate. Rhythm irregular.   Pulmonary:      Effort: Pulmonary effort is normal. No respiratory distress.      Breath sounds: No stridor. Rales present. No wheezing.   Abdominal:      General: Bowel sounds are normal. There is no distension.      Palpations: Abdomen is soft.   Musculoskeletal:      Cervical back: Normal range of motion and neck supple. No edema or erythema.      Right lower leg: No edema.      Left lower leg: No edema.   Lymphadenopathy:      Cervical: No cervical adenopathy.   Skin:     General: Skin is warm and dry.      Findings: No erythema.   Neurological:      Comments: Somnolent, arousable but confused   Psychiatric:         Cognition and Memory: Cognition is impaired. Memory is impaired.         Fluids    Intake/Output Summary (Last 24 hours) at 8/3/2025 1151  Last data filed at 8/3/2025 0930  Gross per 24 hour   Intake 460 ml   Output 800 ml   Net -340 ml        Laboratory  Recent Labs     08/01/25  0320 08/02/25  0210   WBC 6.7 6.2   RBC 3.87* 3.65*   HEMOGLOBIN 12.3* 11.5*   HEMATOCRIT  36.3* 33.6*   MCV 93.8 92.1   MCH 31.8 31.5   MCHC 33.9 34.2   RDW 53.2* 51.9*   PLATELETCT 328 295   MPV 8.3* 8.6*     Recent Labs     08/01/25  0320 08/02/25  0210   SODIUM 143 143   POTASSIUM 4.1 4.0   CHLORIDE 112 113*   CO2 17* 19*   GLUCOSE 106* 112*   BUN 34* 29*   CREATININE 0.93 0.91   CALCIUM 8.8 8.5                   Imaging  DX-CHEST-LIMITED (1 VIEW)   Final Result         1.  Essentially unchanged chest radiograph with left greater than right bibasilar opacities.   2.  Likely bilateral small left greater greater than right pleural effusions.      DX-CHEST-PORTABLE (1 VIEW)   Final Result         1.  Scattered hazy bilateral pulmonary infiltrates, greatest on the left, stable since prior study.         DX-CHEST-PORTABLE (1 VIEW)   Final Result         1.  Scattered hazy bilateral pulmonary infiltrates, greatest on the left.      CT-HEAD W/O   Final Result         1.  No acute intracranial abnormality is identified, there are nonspecific white matter changes, commonly associated with small vessel ischemic disease.  Associated mild cerebral atrophy is noted.   2.  Atherosclerosis.              Assessment/Plan  * Acute hypoxic respiratory failure (HCC)- (present on admission)  Assessment & Plan  Had been acutely worsening  Now requiring high flow nasal cannula  Procalcitonin was mildly elevated, finished antibiotics  COVID was positive, infectious disease is now following  Patient remains DNR  Is at significant risk of worsening and does require close monitoring in the IMCU  Continuous pulse ox monitoring  Continue respiratory care per protocol  Any attempted weaning and patient significantly desaturates  Required more oxygen via high flow nasal cannula, no significant change on chest x-ray  Difficult to wean off high flow nasal cannula, continue to wean as able    Patient is critically ill.   The patient continues to have: Acute respiratory failure with hypoxia  The vital organ system that is affected is  the: Respiratory initially  If untreated there is a high chance of deterioration into: Complete failure  And eventually death.   The critical care that I am providing today is: High flow nasal cannula  The critical that has been undertaken is medically complex.   There has been no overlap in critical care time.   Critical Care Time not including procedures: 36 minutes    Pneumonia due to COVID-19 virus  Assessment & Plan  Patient also with an elevated procalcitonin, continue Unasyn and azithromycin  Infectious diseases following  Due to the severity of his disease, ID is recommending baricitinib, continue, last dose on 8/12  Additional recommendations per infectious disease  Continue respiratory care per protocol  Continuous pulse ox monitoring  Causing acute respiratory failure with hypoxia  Continue baricitinib now that he is safely swallowing  TB testing pending  Continues to require high flow nasal cannula    AF (atrial fibrillation) ([facility])  Assessment & Plan  Appears sinus  Patient now able to swallow, resume home Eliquis and metoprolol    Acute encephalopathy  Assessment & Plan  Likely secondary to an infectious source, unknown baseline  Agitated at times  Passed swallow evaluation    Elevated troponin  Assessment & Plan  Type II from atrial fibrillation and respiratory failure  No additional workup at this time    Advanced care planning/counseling discussion- (present on admission)  Assessment & Plan  Patient is now DNR    Hypothyroid- (present on admission)  Assessment & Plan  Continue Synthroid 25 mcg daily    Essential hypertension- (present on admission)  Assessment & Plan  Continue metoprolol and lisinopril   Continue as needed hydralazine  Adjust as needed          VTE prophylaxis:    therapeutic anticoagulation with eliquis 5 mg BID      I have performed a physical exam and reviewed and updated ROS and Plan today (8/3/2025). In review of yesterday's note (8/2/2025), there are no changes except as  documented above.

## 2025-08-03 NOTE — CARE PLAN
The patient is Watcher - Medium risk of patient condition declining or worsening    Shift Goals  Clinical Goals: Wean O2  Patient Goals: Breakfast  Family Goals: zoie    Progress made toward(s) clinical / shift goals:    Problem: Skin Integrity  Goal: Skin integrity is maintained or improved  Outcome: Progressing     Problem: Fall Risk  Goal: Patient will remain free from falls  Outcome: Progressing     Problem: Safety - Medical Restraint  Goal: Remains free of injury from restraints (Restraint for Interference with Medical Device)  Outcome: Progressing     Problem: Pain - Standard  Goal: Alleviation of pain or a reduction in pain to the patient’s comfort goal  Outcome: Progressing     Problem: Respiratory  Goal: Patient will achieve/maintain optimum respiratory ventilation and gas exchange  Outcome: Progressing       Patient is not progressing towards the following goals:

## 2025-08-03 NOTE — CARE PLAN
The patient is Watcher - Medium risk of patient condition declining or worsening    Shift Goals  Clinical Goals: Wean O2, safety  Patient Goals: Go home  Family Goals: Updates    Progress made toward(s) clinical / shift goals:  all goals met      Problem: Fall Risk  Goal: Patient will remain free from falls  Outcome: Progressing     Problem: Safety - Medical Restraint  Goal: Remains free of injury from restraints (Restraint for Interference with Medical Device)  Outcome: Progressing     Problem: Respiratory  Goal: Patient will achieve/maintain optimum respiratory ventilation and gas exchange  Outcome: Progressing       Patient is not progressing towards the following goals:

## 2025-08-04 ASSESSMENT — PAIN DESCRIPTION - PAIN TYPE
TYPE: ACUTE PAIN

## 2025-08-04 NOTE — CARE PLAN
Problem: Bronchoconstriction  Goal: Improve in air movement and diminished wheezing  Description: Target End Date:  2 to 3 days    1.  Implement inhaled treatments  2.  Evaluate and manage medication effects  Outcome: Not Met     Problem: Humidified High Flow Nasal Cannula  Goal: Maintain adequate oxygenation dependent on patient condition  Description: Target End Date:  resolve prior to discharge or when underlying condition is resolved/stabilized    1.  Implement humidified high flow oxygen therapy  2.  Titrate high flow oxygen to maintain appropriate SpO2  Outcome: Not Met   HHFNC 30/30

## 2025-08-04 NOTE — PROGRESS NOTES
Hospital Medicine Daily Progress Note    Date of Service  8/4/2025    Chief Complaint  Sammy Yanez is a 89 y.o. male admitted 7/28/2025 with altered mental status    Hospital Course  89-year-old male with a past medical history of COPD, hypertension, mood disorder presented with altered mental status.  Patient was also noted to have acute respiratory failure with hypoxia which has continued to worsen.  Patient has tested positive for COVID, also had an elevated procalcitonin and was placed on antibiotics.  Due to ongoing worsening, infectious disease was consulted.    Patient continued to worsen so he was upgraded to the [facility]    Interval Problem Update  Was on high flow nasal cannula, difficult to titrate off but now on oxy mask  Patient is DNR  Agitated at times, confused frequently     Overnight  NSR with PVCs  -170  Oxymask 6L  Adequate uop    Overnight 8/3  Combative, restrained and given haldol  No pain  Afebrile  Sinus arrhythmia  -150  Tolerating diet  BM yesterday  Adequate uop  HFNC 30L 40%    Overnight 8/2  Oriented to self and place  Confused and restless  Following commands  Tmax 98.7  NSR and afib  -160  Tolerating diet  BM yesterday  Adequate uop  HFNC 20L 40%    Overnight 8/1  Confused  Afebrile  Afib   -150  Tolerating diet but poor intake  BM 7/31  Adequate uop  HFNC 40L70%    Overnight 7/31  More confused  Sinus tach with PVCs  No BM  Tolerating diet  HFNC 40L 60%    Overnight 7/30  No events  More alert  In and out of afib 80-90  SBP   Strict npo for speech eval  BM 7/25  Adequate uop  High flow 40L60%    I have discussed this patient's plan of care and discharge plan at IDT rounds today with Case Management, Nursing, Nursing leadership, and other members of the IDT team.    Consultants/Specialty  infectious disease    Code Status  DNAR/DNI    Disposition  The patient is not medically cleared for discharge to home or a post-acute facility.  Anticipate  discharge to: skilled nursing facility    I have placed the appropriate orders for post-discharge needs.    Review of Systems  Review of Systems   Unable to perform ROS: Acuity of condition        Physical Exam  Temp:  [36.5 °C (97.7 °F)-36.6 °C (97.9 °F)] 36.6 °C (97.9 °F)  Pulse:  [66-92] 77  Resp:  [16-30] 20  BP: (143-188)/(62-90) 147/68  SpO2:  [90 %-96 %] 90 %    Physical Exam  Vitals and nursing note reviewed.   Constitutional:       General: He is not in acute distress.     Appearance: He is well-developed. He is ill-appearing.   HENT:      Head: Normocephalic and atraumatic.      Right Ear: External ear normal.      Left Ear: External ear normal.      Nose: Nose normal. No congestion or rhinorrhea.      Mouth/Throat:      Pharynx: Oropharynx is clear. No oropharyngeal exudate or posterior oropharyngeal erythema.   Eyes:      General:         Right eye: No discharge.         Left eye: No discharge.   Neck:      Trachea: No tracheal deviation.   Cardiovascular:      Rate and Rhythm: Normal rate. Rhythm irregular.   Pulmonary:      Effort: Pulmonary effort is normal. No respiratory distress.      Breath sounds: No stridor. Rales (improved) present. No wheezing.   Abdominal:      General: Bowel sounds are normal. There is no distension.   Musculoskeletal:      Cervical back: Normal range of motion and neck supple. No edema or erythema.      Right lower leg: No edema.      Left lower leg: No edema.   Skin:     General: Skin is warm and dry.   Neurological:      Comments: Somnolent, arousable but confused   Psychiatric:         Cognition and Memory: Cognition is impaired. Memory is impaired.         Fluids    Intake/Output Summary (Last 24 hours) at 8/4/2025 0814  Last data filed at 8/3/2025 0930  Gross per 24 hour   Intake 360 ml   Output --   Net 360 ml        Laboratory  Recent Labs     08/02/25  0210 08/04/25  0450   WBC 6.2 8.4   RBC 3.65* 3.74*   HEMOGLOBIN 11.5* 11.8*   HEMATOCRIT 33.6* 34.6*   MCV 92.1  92.5   MCH 31.5 31.6   MCHC 34.2 34.1   RDW 51.9* 51.8*   PLATELETCT 295 278   MPV 8.6* 8.2*     Recent Labs     08/02/25  0210 08/04/25  0450   SODIUM 143 141   POTASSIUM 4.0 3.9   CHLORIDE 113* 109   CO2 19* 21   GLUCOSE 112* 103*   BUN 29* 27*   CREATININE 0.91 0.91   CALCIUM 8.5 8.8                   Imaging  DX-CHEST-LIMITED (1 VIEW)   Final Result         1.  Essentially unchanged chest radiograph with left greater than right bibasilar opacities.   2.  Likely bilateral small left greater greater than right pleural effusions.      DX-CHEST-PORTABLE (1 VIEW)   Final Result         1.  Scattered hazy bilateral pulmonary infiltrates, greatest on the left, stable since prior study.         DX-CHEST-PORTABLE (1 VIEW)   Final Result         1.  Scattered hazy bilateral pulmonary infiltrates, greatest on the left.      CT-HEAD W/O   Final Result         1.  No acute intracranial abnormality is identified, there are nonspecific white matter changes, commonly associated with small vessel ischemic disease.  Associated mild cerebral atrophy is noted.   2.  Atherosclerosis.              Assessment/Plan  * Acute hypoxic respiratory failure (HCC)- (present on admission)  Assessment & Plan  Had been acutely worsening  Now requiring high flow nasal cannula  Procalcitonin was mildly elevated, finished antibiotics  COVID was positive, infectious disease is now following  Patient remains DNR  Is at significant risk of worsening and does require close monitoring in the IMCU  Continuous pulse ox monitoring  Continue respiratory care per protocol  Any attempted weaning and patient significantly desaturates  Required more oxygen via high flow nasal cannula, no significant change on chest x-ray  Difficult to wean off high flow nasal cannula, continue to wean as able  Now improved, able to wean off high flow nasal cannula, good sats on 6 L mask    Pneumonia due to COVID-19 virus  Assessment & Plan  Patient also with an elevated  procalcitonin, continue Unasyn and azithromycin  Infectious diseases following  Due to the severity of his disease, ID is recommending baricitinib, continue, last dose on 8/12  Additional recommendations per infectious disease  Continue respiratory care per protocol  Continuous pulse ox monitoring  Causing acute respiratory failure with hypoxia  Continue baricitinib now that he is safely swallowing  TB testing negative  Able to wean off high flow nasal cannula, good sats on 6 L mask  Continue Decadron    AF (atrial fibrillation) ([facility])  Assessment & Plan  Appears sinus  Patient now able to swallow, continue home Eliquis and metoprolol    Acute encephalopathy  Assessment & Plan  Likely secondary to an infectious source, unknown baseline  Agitated at times  Passed swallow evaluation    Elevated troponin  Assessment & Plan  Type II from atrial fibrillation and respiratory failure  No additional workup at this time    Advanced care planning/counseling discussion- (present on admission)  Assessment & Plan  Patient is now DNR    Hypothyroid- (present on admission)  Assessment & Plan  Continue Synthroid 25 mcg daily    Essential hypertension- (present on admission)  Assessment & Plan  Continue metoprolol and lisinopril   Continue as needed hydralazine  Adjust as needed          VTE prophylaxis:    therapeutic anticoagulation with eliquis 5 mg BID      I have performed a physical exam and reviewed and updated ROS and Plan today (8/4/2025). In review of yesterday's note (8/3/2025), there are no changes except as documented above.

## 2025-08-04 NOTE — PROGRESS NOTES
Bedside report received, Assume care.     A&O x 2 to person and place, Able to make needs known.  Pain Assessment: 0/10 . Medication provided per MAR.  Continuous cardiac and [place] monitoring in place.     Bed in low position and locked, pt resting comfortably now, call light with in reach, all needs met at this time. Interventions will be executed per plan of care.

## 2025-08-04 NOTE — PROGRESS NOTES
4 Eyes Skin Assessment Completed by Sandra RN and Og RN.    Skin assessment is primarily focused on high risk bony prominences. Pay special attention to skin beneath and around medical devices, high risk bony prominences, skin to skin areas and areas where the patient lacks sensation to feel pain and areas where the patient previously had breakdown.     Head (Occipital):  WDL   Ears (Under Medical Devices): WDL   Nose (Under Medical Devices): WDL   Mouth:  WDL   Neck: WDL   Breast/Chest:  WDL   Shoulder Blades:  WDL   Spine:   Discoloration midline    (R) Arm/Elbow/Hand: scattered bruising    (L) Arm/Elbow/Hand: scattered bruising    Abdomen: Scar   Pannus/Groin:  WDL   Sacrum/Coccyx:   Rash, Pink, Red, and Blanching   (R) Ischial Tuberosity (Sit Bones):  WDL   (L) Ischial Tuberosity (Sit Bones):  WDL   (R) Leg:  WDL   (L) Leg:  WDL   (R) Heel:  Pink and Blanching   (R) Foot/Toe: WDL   (L) Heel: Pink and Blanching   (L) Foot/Toe:  WDL       DEVICES IN USE:   Respiratory Devices:  Oxygen mask  Feeding Devices:  N/A   Lines & BP Monitoring Devices:  Peripheral IV    Orthopedic Devices:  N/A  Miscellaneous Devices:  Soft restraints, mits     PROTOCOL INTERVENTIONS:   Standard/Trauma Bed:  Already in place  Condom Cath/Purewick:  Reinforced/reapplied    WOUND PHOTOS:   Completed and in EPIC     WOUND CONSULT:   N/A, no advanced wound care needs identified

## 2025-08-04 NOTE — RESPIRATORY CARE
"COPD EDUCATION by COPD CLINICAL EDUCATOR  8/4/2025  at  6:13 AM by Elly Garg, RRT     Patient interviewed by education team.  Patient declined or is unable to participate in the full program.  Patient not appropriate for education at this time.  COPD team remains available if family has questions regarding COPD or medications during this admission.      COPD Screen  COPD Risk Screening  Do you have a history of COPD?: Yes  Do you have a Pulmonologist?: No  COPD Population Screener  During the past 4 weeks, how much did you feel short of breath?:  (unable to answer screeners at this time)    COPD Assessment  COPD Clinical Specialists ONLY  COPD Education Initiated: Yes--Short Intervention (pt remains ALOC w/ restraints, have made many attempts throughout the week to assess or wait for family to be bedside. no needs identified on this patient, likely will go to SNF, nonsmoker, alb neb prn per med req. pt previously denied COPD hx per chart.)  Is this a COPD exacerbation patient?: No  DME Company: TBD, order for neb on med req, may be new order  Physician Follow Up Appointment: 09/17/25  Appt Time: 1600  Physician Name: Jj Molina M.D.  Pulmonologist Name: [facility] last seen 2023  Pulmonary Rehab: No  Smoking Cessation: N/A  Hospice: No  Home Health Care:  (TBD, likely SNF)  Mobile Urgent Care Services: No  Geriatric Specialty Group: No  Private In-Home Care Agency: No  (OP) Pulmonary Function Testing:  (None found)  Interdisciplinary Rounds: Attendance at Rounds (30 Min)    PFT Results    No results found for: \"PFT\"    Meds to Beds  [facility] provides bedside medication delivery for all eligible patients at discharge and you have been automatically enrolled in the Meds to Beds Program. Would you like to opt out of this program for any reason?: No - Stay Opted In     MY COPD ACTION PLAN     It is recommended that patients and physicians/healthcare providers complete this action plan together. This plan should " "be discussed at each physician visit and updated as needed.    The green, yellow and red zones show groups of symptoms of COPD. This list of symptoms is not comprehensive, and you may experience other symptoms. In the \"Actions\" column, your healthcare provider has recommended actions for you to take based on your symptoms.    Patient Name: Sammy Yanez   YOB: 1935   Last Updated on: 8/4/2025  6:13 AM   Green Zone:  I am doing well today Actions     Usual activitiy and exercise level   Take daily medications     Usual amounts of cough and phlegm/mucus   Use oxygen as prescribed     Sleep well at night   Continue regular exercise/diet plan     Appetite is good   At all times avoid cigarette smoke, inhaled irritants     Daily Medications (these medications are taken every day):                Yellow Zone:  I am having a bad day or a COPD flare Actions     More breathless than usual   Continue daily medications     I have less energy for my daily activities   Use quick relief inhaler as ordered     Increased or thicker phlegm/mucus   Use oxygen as prescribed     Using quick relief inhaler/nebulizer more often   Get plenty of rest     Swelling of ankles more than usual   Use pursed lip breathing     More coughing than usual   At all times avoid cigarette smoke, inhaled irritants     I feel like I have a \"chest cold\"     Poor sleep and my symptoms woke me up     My appetite is not good     My medicine is not helping      Call provider immediately if symptoms don’t improve     Continue daily medications, add rescue medications:   Albuterol 3mL via nebulizer PRN       Medications to be used during a flare up, (as Discussed with Provider):           Additional Information:  Keep nebulizer cup clean, allow to air dry    Red Zone:  I need urgent medical care Actions     Severe shortness of breath even at rest   Call 911 or seek medical care immediately     Not able to do any activity because of breathing      " Fever or shaking chills      Feeling confused or very drowsy       Chest pains      Coughing up blood

## 2025-08-04 NOTE — CARE PLAN
Problem: Knowledge Deficit - Standard  Goal: Patient and family/care givers will demonstrate understanding of plan of care, disease process/condition, diagnostic tests and medications  Outcome: Progressing     Problem: Skin Integrity  Goal: Skin integrity is maintained or improved  Outcome: Progressing     Problem: Fall Risk  Goal: Patient will remain free from falls  Outcome: Progressing     Problem: Safety - Medical Restraint  Goal: Remains free of injury from restraints (Restraint for Interference with Medical Device)  Outcome: Progressing  Goal: Free from restraint(s) (Restraint for Interference with Medical Device)  Outcome: Progressing     Problem: Pain - Standard  Goal: Alleviation of pain or a reduction in pain to the patient’s comfort goal  Outcome: Progressing     Problem: Psychosocial  Goal: Patient's level of anxiety will decrease  Outcome: Progressing  Goal: Patient's ability to verbalize feelings about condition will improve  Outcome: Progressing  Goal: Patient's ability to re-evaluate and adapt role responsibilities will improve  Outcome: Progressing  Goal: Patient and family will demonstrate ability to cope with life altering diagnosis and/or procedure  Outcome: Progressing  Goal: Spiritual and cultural needs incorporated into hospitalization  Outcome: Progressing     Problem: Hemodynamics  Goal: Patient's hemodynamics, fluid balance and neurologic status will be stable or improve  Outcome: Progressing     Problem: Respiratory  Goal: Patient will achieve/maintain optimum respiratory ventilation and gas exchange  Outcome: Progressing     Problem: Risk for Aspiration  Goal: Patient's risk for aspiration will be absent or decrease  Outcome: Progressing     Problem: Urinary - Renal Perfusion  Goal: Ability to achieve and maintain adequate renal perfusion and functioning will improve  Outcome: Progressing     Problem: Venous Thromboembolism (VTE) Prevention  Goal: The patient will remain free from  venous thromboembolism (VTE)  Outcome: Progressing     Problem: Nutrition  Goal: Patient's nutritional and fluid intake will be adequate or improve  Outcome: Progressing     Problem: Urinary Elimination  Goal: Establish and maintain regular urinary output  Outcome: Progressing     Problem: Bowel Elimination  Goal: Establish and maintain regular bowel function  Outcome: Progressing   The patient is Watcher - Medium risk of patient condition declining or worsening    Shift Goals  Clinical Goals: Wean O2  Patient Goals: Rest, decrease anxiety  Family Goals: zoie    Progress made toward(s) clinical / shift goals:  Rest, decreased anxiety

## 2025-08-05 ASSESSMENT — ENCOUNTER SYMPTOMS
SHORTNESS OF BREATH: 0
FEVER: 0
MUSCULOSKELETAL NEGATIVE: 1
BACK PAIN: 0
RESPIRATORY NEGATIVE: 1
CARDIOVASCULAR NEGATIVE: 1
CHILLS: 0
CONSTITUTIONAL NEGATIVE: 1
ABDOMINAL PAIN: 0
NEUROLOGICAL NEGATIVE: 1
NAUSEA: 0
EYES NEGATIVE: 1
GASTROINTESTINAL NEGATIVE: 1

## 2025-08-05 ASSESSMENT — COGNITIVE AND FUNCTIONAL STATUS - GENERAL
TURNING FROM BACK TO SIDE WHILE IN FLAT BAD: A LITTLE
MOBILITY SCORE: 17
STANDING UP FROM CHAIR USING ARMS: A LITTLE
MOVING FROM LYING ON BACK TO SITTING ON SIDE OF FLAT BED: A LITTLE
WALKING IN HOSPITAL ROOM: A LITTLE
MOVING TO AND FROM BED TO CHAIR: A LITTLE
SUGGESTED CMS G CODE MODIFIER MOBILITY: CK
CLIMB 3 TO 5 STEPS WITH RAILING: A LOT

## 2025-08-05 ASSESSMENT — GAIT ASSESSMENTS
ASSISTIVE DEVICE: FRONT WHEEL WALKER
GAIT LEVEL OF ASSIST: MINIMAL ASSIST
DEVIATION: INCREASED BASE OF SUPPORT;BRADYKINETIC;SHUFFLED GAIT;DECREASED HEEL STRIKE;DECREASED TOE OFF
DISTANCE (FEET): 15

## 2025-08-05 NOTE — CARE PLAN
The patient is Stable - Low risk of patient condition declining or worsening    Shift Goals  Clinical Goals: abx, safety, communication, resp status  Patient Goals: to eat  Family Goals: updates    Progress made toward(s) clinical / shift goals:    Problem: Knowledge Deficit - Standard  Goal: Patient and family/care givers will demonstrate understanding of plan of care, disease process/condition, diagnostic tests and medications  Description: Target End Date:  1-3 days or as soon as patient condition allows    Document in Patient Education    1.  Patient and family/caregiver oriented to unit, equipment, visitation policy and means for communicating concern  2.  Complete/review Learning Assessment  3.  Assess knowledge level of disease process/condition, treatment plan, diagnostic tests and medications  4.  Explain disease process/condition, treatment plan, diagnostic tests and medications  Outcome: Progressing     Problem: Fall Risk  Goal: Patient will remain free from falls  Description: Target End Date:  Prior to discharge or change in level of care    Document interventions on the [facility] Fall Risk Assessment    1.  Assess for fall risk factors  2.  Implement fall precautions  Outcome: Progressing     Problem: Pain - Standard  Goal: Alleviation of pain or a reduction in pain to the patient’s comfort goal  Description: Target End Date:  Prior to discharge or change in level of care    Document on Vitals flowsheet    1.  Document pain using the appropriate pain scale per order or unit policy  2.  Educate and implement non-pharmacologic comfort measures (i.e. relaxation, distraction, massage, cold/heat therapy, etc.)  3.  Pain management medications as ordered  4.  Reassess pain after pain med administration per policy  5.  If opiods administered assess patient's response to pain medication is appropriate per POSS sedation scale  6.  Follow pain management plan developed in collaboration with patient and  interdisciplinary team (including palliative care or pain specialists if applicable)  Outcome: Progressing       Patient is not progressing towards the following goals:

## 2025-08-05 NOTE — DISCHARGE PLANNING
1600: Writer called Daughter/Katharine SALOMON: [phone], to obtain post-acute choice. Daughter requested more information about facilities. Writer left list of accepting post-acute facilities: [facility], [facility], [facility], [facility], [facility] in patient's room for family to look over facilities for decision. Writer plans to revisit post-acute choice with family tomorrow, 8/6/2025, in anticipation of medical clearance.

## 2025-08-05 NOTE — CARE PLAN
The patient is Stable - Low risk of patient condition declining or worsening    Shift Goals  Clinical Goals: abx, safety, communication, wean o2  Patient Goals: rest  Family Goals: POC    Progress made toward(s) clinical / shift goals:    Problem: Knowledge Deficit - Standard  Goal: Patient and family/care givers will demonstrate understanding of plan of care, disease process/condition, diagnostic tests and medications  Description: Target End Date:  1-3 days or as soon as patient condition allows    Document in Patient Education    1.  Patient and family/caregiver oriented to unit, equipment, visitation policy and means for communicating concern  2.  Complete/review Learning Assessment  3.  Assess knowledge level of disease process/condition, treatment plan, diagnostic tests and medications  4.  Explain disease process/condition, treatment plan, diagnostic tests and medications  Outcome: Progressing     Problem: Fall Risk  Goal: Patient will remain free from falls  Description: Target End Date:  Prior to discharge or change in level of care    Document interventions on the [facility] Fall Risk Assessment    1.  Assess for fall risk factors  2.  Implement fall precautions  Outcome: Progressing     Problem: Safety - Medical Restraint  Goal: Remains free of injury from restraints (Restraint for Interference with Medical Device)  Description: INTERVENTIONS:  1. Determine that other, less restrictive measures have been tried or would not be effective before applying the restraint  2. Evaluate the patient's condition at the time of restraint application  3. Educate patient/family regarding the reason for restraint  4. Q2H: Monitor safety, psychosocial status, comfort, circulation, respiratory status, LOC, nutrition and hydration  Outcome: Progressing  Flowsheets (Taken 8/4/2025 5048)  Addressed this shift: Remains free of injury from restraints (restraint for interference with medical device):   Determine that other,  less restrictive measures have been tried or would not be effective before applying the restraint   Evaluate the patient's condition at the time of restraint application   Inform patient/family regarding the reason for restraint   Every 2 hours: Monitor safety, psychosocial status, comfort, nutrition and hydration     Problem: Pain - Standard  Goal: Alleviation of pain or a reduction in pain to the patient’s comfort goal  Description: Target End Date:  Prior to discharge or change in level of care    Document on Vitals flowsheet    1.  Document pain using the appropriate pain scale per order or unit policy  2.  Educate and implement non-pharmacologic comfort measures (i.e. relaxation, distraction, massage, cold/heat therapy, etc.)  3.  Pain management medications as ordered  4.  Reassess pain after pain med administration per policy  5.  If opiods administered assess patient's response to pain medication is appropriate per POSS sedation scale  6.  Follow pain management plan developed in collaboration with patient and interdisciplinary team (including palliative care or pain specialists if applicable)  Outcome: Progressing     Problem: Respiratory  Goal: Patient will achieve/maintain optimum respiratory ventilation and gas exchange  Description: Target End Date:  Prior to discharge or change in level of care    Document on Assessment flowsheet    1.  Assess and monitor rate, rhythm, depth and effort of respiration  2.  Breath sounds assessed qshift and/or as needed  3.  Assess O2 saturation, administer/titrate oxygen as ordered  4.  Position patient for maximum ventilatory efficiency  5.  Turn, cough, and deep breath with splinting to improve effectiveness  6.  Collaborate with RT to administer medication/treatments per order  7.  Encourage use of incentive spirometer and encourage patient to cough after use and utilize splinting techniques if applicable  8.  Airway suctioning  9.  Monitor sputum production for  changes in color, consistency and frequency  10. Perform frequent oral hygiene  11. Alternate physical activity with rest periods  Outcome: Progressing       Patient is not progressing towards the following goals:

## 2025-08-05 NOTE — DISCHARGE PLANNING
Not medically cleared.   Recently transferred to [facility] from [facility].   Is being safely titrated down on supplemental oxygen, currently on 2 LPM via Oxy-mask (Room air prior to admission).    -Remains acutely encephalopathic (oriented to self-only). Patient continues to experience agitation and behavioral issues secondary to encephalopathy. Haldol given PRN last night and was placed in Non-violent Mitten restraints yesterday, 8/4/2025, at 2314.     -+COVID-19 and remains on Enhanced Droplet/Contact Precautions.     -Multiple accepting [facility] SNFs. IDT aware patient will need to remain out of all restraints/sitter for >24 hours for SNF placement.     -ID consulted by internal medicine for Elevated Procalcitinon and Infection Source, pending consult and recommendations.     -Of note, patient was independent with ADLs prior to baseline and was not requiring DME, including oxygen. Patient has good family support.     -DNAR/DNI but no AD paperwork on file. Please contact patient's Daughter, Katharine Robb: [phone], for consents and/or goals of care discussion if needed due to patient's current cognitive status.     Case Management Discharge Planning    Admission Date: 7/28/2025  GMLOS: 4.6  ALOS: 8    6-Clicks ADL Score: 14  6-Clicks Mobility Score: 12  PT and/or OT Eval ordered: Yes  Post-acute Referrals Ordered: Yes  Post-acute Choice Obtained: No  Has referral(s) been sent to post-acute provider:  Yes    Anticipated Discharge Dispo: Discharge Disposition: Disch to a long term care facility (63)    DME Needed: No    Action(s) Taken: Updated Provider/Nurse on Discharge Plan    Escalations Completed: Provider and Speciality Provider    Medically Clear: No    Next Steps: Pending plan of care/ID recommendations.     Barriers to Discharge: Medical clearance    Is the patient up for discharge tomorrow: No.

## 2025-08-05 NOTE — PROGRESS NOTES
Hospital Medicine Daily Progress Note    Date of Service  8/5/2025    Chief Complaint  Sammy Yanez is a 89 y.o. male admitted 7/28/2025 with altered mental status    Hospital Course  89-year-old male with a past medical history of COPD, hypertension, mood disorder presented with altered mental status.  Patient was also noted to have acute respiratory failure with hypoxia which has continued to worsen.  Patient has tested positive for COVID, also had an elevated procalcitonin and was placed on antibiotics.  Due to ongoing worsening, infectious disease was consulted and patient was started on baricitinib and decadron.  Patient was also upgraded to IMCU as he was requiring high flow nasal cannula.  Discussion was had with the family and patient's CODE STATUS changed to DNR/DNI.  Patient was weaned off high flow and downgraded to medical floor.    Interval Problem Update  8/5 intermittent hypertension, oxygen weaned down to 2 L oxymask  -At time of my evaluation patient on room air  WBC 11.7, hemoglobin 12.9  Creatinine stable  Patient is still on barcitinib and Decadron, he has finished his IV antibiotics  Therapy last recommended placement however have not seen since 7/30  Per night APRN patient still agitated requiring as needed Haldol and placed in mitten restraints.   Patient able to answer orientation questions however he states he wants to go home.  On further questioning patient cannot elaborate on how he would get safety or how he will be able to manage at home.  At this time he does not have capacity to make his own medical decisions.  Discussed with family at bedside, they report patient has been depressed refusing to eat and had expressed suicidal ideation to them prior to admission.  They are extremely worried about him going home as they have stairs that he would have to climb to get to his room.  They are agreeable to postacute placement.   Discussed with psychiatry given patient's ongoing agitation  and prior depression, recommending to titrate off Celexa as it does not appear to be improving his mood can also use Seroquel as needed for agitation overnight on top of the nightly Seroquel dose    I have discussed this patient's plan of care and discharge plan at IDT rounds today with Case Management, Nursing, Nursing leadership, and other members of the IDT team.    Consultants/Specialty  infectious disease  Psychiatry    Code Status  DNAR/DNI    Disposition  The patient is not medically cleared for discharge to home or a post-acute facility.      I have placed the appropriate orders for post-discharge needs.    Review of Systems  Review of Systems   Unable to perform ROS: Acuity of condition   Constitutional:  Negative for chills and fever.   Respiratory:  Negative for shortness of breath.    Cardiovascular:  Negative for chest pain.   Gastrointestinal:  Negative for abdominal pain and nausea.   Musculoskeletal:  Negative for back pain.        Physical Exam  Temp:  [36.5 °C (97.7 °F)-36.9 °C (98.4 °F)] 36.5 °C (97.7 °F)  Pulse:  [66-89] 66  Resp:  [18-19] 18  BP: (117-148)/(65-87) 120/87  SpO2:  [91 %-95 %] 91 %    Physical Exam  Vitals and nursing note reviewed.   Constitutional:       General: He is not in acute distress.     Appearance: He is well-developed. He is obese. He is ill-appearing.   HENT:      Head: Normocephalic and atraumatic.      Mouth/Throat:      Pharynx: Oropharynx is clear.   Eyes:      General:         Right eye: No discharge.         Left eye: No discharge.      Conjunctiva/sclera: Conjunctivae normal.   Neck:      Trachea: No tracheal deviation.   Cardiovascular:      Rate and Rhythm: Normal rate. Rhythm irregular.   Pulmonary:      Effort: Pulmonary effort is normal. No respiratory distress.      Breath sounds: No wheezing.      Comments: On room air at time of my evaluation  Abdominal:      General: There is no distension.      Palpations: Abdomen is soft.      Tenderness: There is no  abdominal tenderness.   Musculoskeletal:      Cervical back: Neck supple. No edema or erythema.      Right lower leg: No edema.      Left lower leg: No edema.   Skin:     General: Skin is warm and dry.   Neurological:      General: No focal deficit present.      Mental Status: He is alert.      Motor: Weakness present.   Psychiatric:         Behavior: Behavior normal.         Cognition and Memory: Cognition is impaired. Memory is impaired.         Fluids    Intake/Output Summary (Last 24 hours) at 8/5/2025 1503  Last data filed at 8/5/2025 1200  Gross per 24 hour   Intake 640 ml   Output 450 ml   Net 190 ml        Laboratory  Recent Labs     08/04/25  0450 08/05/25  0108   WBC 8.4 11.7*   RBC 3.74* 4.13*   HEMOGLOBIN 11.8* 12.9*   HEMATOCRIT 34.6* 39.2*   MCV 92.5 94.9   MCH 31.6 31.2   MCHC 34.1 32.9   RDW 51.8* 51.9*   PLATELETCT 278 309   MPV 8.2* 8.5*     Recent Labs     08/04/25  0450 08/05/25  0108   SODIUM 141 138   POTASSIUM 3.9 4.0   CHLORIDE 109 106   CO2 21 20   GLUCOSE 103* 105*   BUN 27* 32*   CREATININE 0.91 0.96   CALCIUM 8.8 9.1                   Imaging  DX-CHEST-LIMITED (1 VIEW)   Final Result         1.  Essentially unchanged chest radiograph with left greater than right bibasilar opacities.   2.  Likely bilateral small left greater greater than right pleural effusions.      DX-CHEST-PORTABLE (1 VIEW)   Final Result         1.  Scattered hazy bilateral pulmonary infiltrates, greatest on the left, stable since prior study.         DX-CHEST-PORTABLE (1 VIEW)   Final Result         1.  Scattered hazy bilateral pulmonary infiltrates, greatest on the left.      CT-HEAD W/O   Final Result         1.  No acute intracranial abnormality is identified, there are nonspecific white matter changes, commonly associated with small vessel ischemic disease.  Associated mild cerebral atrophy is noted.   2.  Atherosclerosis.              Assessment/Plan  * Acute hypoxic respiratory failure (HCC)- (present on  admission)  Assessment & Plan  Had been acutely worsening  Now requiring high flow nasal cannula  Procalcitonin was mildly elevated, finished antibiotics  COVID was positive, infectious disease is now following  Patient remains DNR  Is at significant risk of worsening and does require close monitoring in the IMCU  Continuous pulse ox monitoring  Continue respiratory care per protocol  Any attempted weaning and patient significantly desaturates  Required more oxygen via high flow nasal cannula, no significant change on chest x-ray  Has been weaned off HFNC    On room air this afternoon    Pneumonia due to COVID-19 virus  Assessment & Plan  Patient also with an elevated procalcitonin, continue Unasyn and azithromycin  Infectious diseases following  Due to the severity of his disease, ID is recommending baricitinib, continue, last dose on 8/12  -Discontinue baricitinib if:  ALT >250  AST >225  eGFR <15  ALC < 200   ANC < 1000  Hg < 8  -OK to discontinue barticinib if patient cleared for discharge  TB testing negative  Able to wean off high flow nasal cannula, good sats on 6 L mask  Continue Decadron    PT/OT- post acute   SNF placement, will have to wait till off restraints 24 hrs     AF (atrial fibrillation) ([facility])  Assessment & Plan  Appears sinus  Patient now able to swallow, continue home Eliquis and metoprolol    Acute encephalopathy  Assessment & Plan  Likely due to delirium and infection   Agitated at times  Passed swallow evaluation  Psychiatry consulted for agitation  -Continue Seroquel nightly  -Seroquel as needed for agitation    At this time patient does not have capacity to make medical decision    Elevated troponin  Assessment & Plan  Type II from atrial fibrillation and respiratory failure  No additional workup at this time    Advanced care planning/counseling discussion- (present on admission)  Assessment & Plan  DNR/DNI  I discussed advance care planning for at least 15 minutes with the patient and his  family, including diagnosis, prognosis, plan of care, risks and benefits of any therapies that could be offered, as well as alternatives including palliation and hospice as appropriate.   Time spent was exclusive of evaluation and management of other separately billable procedures.  Start time: 1400 End time: 1415    Confirmed patient is DNR/DNI.  Discussed ongoing encephalopathy could very well be due to his COVID infection and prolonged hospitalized in IMCU requiring high flow oxygen.  Prior to hospitalization patient was very depressed and not eating and not participating with his family.  They were considering hospice at that time.  After psychiatry met with them earlier they are hopeful the medication changes will help with patient's mood.  Discussed prolonged encephalopathy that occasionally occurs with COVID infection, given patient's age if he does not progress at SNF hospice could be a good option in the future    Hypothyroid- (present on admission)  Assessment & Plan  Continue Synthroid 25 mcg daily    Essential hypertension- (present on admission)  Assessment & Plan  Continue metoprolol and lisinopril   Continue as needed hydralazine  Adjust as needed       Total time spent in chart review, at bedside with the patient, discussing with consultants, nursing and case management: 57 minutes    VTE prophylaxis:    therapeutic anticoagulation with eliquis 5 mg BID      I have performed a physical exam and reviewed and updated ROS and Plan today (8/5/2025). In review of yesterday's note (8/4/2025), there are no changes except as documented above.

## 2025-08-05 NOTE — DIETARY
"Nutrition Services: Follow-up for Nutrition Care Plan   Day 8 of admit. Sammy Yanez is a 89 y.o. male with admitting DX of Acute hypoxic respiratory failure (HCC) [J96.01]    Seen patient at bedside today 2/2 observed insufficient oral intake per ADLs, with most meals documented at 25-50%. Spoke with a family member in English; they declined assistance with  services. Family reported that the patient’s oral intake is progressing slowly but has improved compared to intake at home. They stated that the patient began refusing meals approximately 2-3 weeks PTA , reportedly 2/2 mood-related issues. They denied that the poor intake is related to GI distress or swallowing difficulties, explaining that the patient \"just decided not to eat.\"     Noted medical history is significant for a mood disorder. Family noted that the patient’s oral intake has been inconsistent since the passing of his wife two years ago, and that he has been going through a prolonged grieving process. Over the past 2 weeks, when the patient was not eating well,  family provided some snacks (e.g., string cheese, yogurt) and two high-protein drinks daily at home. NFPE revealed no signs of fat or muscle wasting. Weight history review showed a 5.8% weight loss > 7 months, which is not considered clinically significant. Family agreed to add Ensure High Protein BID to diet to support energy needs.    Current diet order:   IDDSI level 5 - minced/moist     Nutrition Dx:  Inadequate oral intake r/t mood per patient family AEB < 75% estimated needs > 2 weeks PTA      Nutrition Dx Status: Ongoing     Problem: Nutritional:  Goal: Achieve adequate nutritional intake  Description: Patient will consume > 50 %  of meals  Outcome: Progress slower then expected       Plan/ Recommendations:      Encourage intake of meals, goal for > 50 % consumption from meals and/or supplements ( Added Ensure BID on diet today)   Document intake of all meals as % taken " in ADLs to provide interdisciplinary communication across all shifts.   Monitor weight.  Nutrition rep available upon request to see patient for ongoing meal and snack preferences.       RD following

## 2025-08-05 NOTE — PROGRESS NOTES
NOC HOSPITALIST CROSS COVER    Notified by RN regarding patient pulling lines and IVs repeatedly throughout the shift. He was given a PRN dose of Haldol with no improvement in agitation. Will trial mitten restraints for patient safety.       Vitals:    08/04/25 1938   BP: (!) 148/78   Pulse: 70   Resp: 18   Temp: 36.9 °C (98.4 °F)   SpO2: 91%      -----------------------------------------------------------------------------------------------------------    Electronically signed by:  Josh Sandoval, PEPE, APRN, NAYP-BC  Hospitalist Services
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
## 2025-08-05 NOTE — THERAPY
"Physical Therapy   Initial Evaluation     Patient Name:  Sammy Yanez  Age:  89 y.o., Sex:  male  Medical Record #:  [id number]  Today's Date: 8/5/2025     Precautions  Medical: (P) Fall Risk, Altered Mentation  Swallowing: (P) Swallow Precautions    Assessment  Patient is 89 y.o. male admitted 7/28 with AMS found to have COVID19, PNA, resp failure requiring HHFNC. PMH includes COPD, HTN, mood disorder.     Patient received in bed and agreeable to PT session with encouragement from pt's daughter. Pt able to mobilize grossly with CGA-Magdalene and FWW. When transferring on the toilet and to the chair pt attempting to sit prematurely requiring max verbal cues for safety. Pt is primarily limited by generalized weakness, impaired balance, limited activity tolerance, and impaired cognition. Recommend post acute placement. Will follow for acute care PT needs.    Plan    Physical Therapy Initial Treatment Plan   Treatment Plan : (P) Bed Mobility, Gait Training, Neuro Re-Education / Balance, Self Care / Home Evaluation, Stair Training, Therapeutic Activities, Therapeutic Exercise  Treatment Frequency: (P) 4 Times per Week  Duration: (P) Until Therapy Goals Met    DC Equipment Recommendations: (P) Unable to determine at this time  Discharge Recommendations: (P) Recommend post-acute placement for additional physical therapy services prior to discharge home       Subjective    \"Jerry! Jerry!\"     Objective       08/05/25 1055    Services   Is patient using  services for this encounter? Yes   Language Interpreted Occitan    Name Magali [id number]  (then pt's daughter arrived and preferred to interpret for pt)   Content of Interpretation (select all)   (PT eval)   Initial Contact Note    Initial Contact Note Order Received and Verified, Physical Therapy Evaluation in Progress with Full Report to Follow.   Precautions   Medical Fall Risk;Altered Mentation   Swallowing Swallow Precautions   Vitals   O2 (LPM) " "0   O2 Delivery Device None - Room Air   Vitals Comments VSS, SpO2 >90% on room air with mobility   Pain 0 - 10 Group   Therapist Pain Assessment Post Activity Pain Same as Prior to Activity;Nurse Notified  (pain not rated)   Prior Living Situation   Housing / Facility 1 Story Apartment / Condo  (on 2nd floor)   Steps Into Home   (flight of stairs)   Equipment Owned Single Point Cane  (can get FWW from care chest per daughter)   Lives with - Patient's Self Care Capacity Adult Children  (Son)   Comments Son works daily and pt is alone during that time. Pt's daughter lives local and able to come assist, works part time   Prior Level of Functional Mobility   Bed Mobility Independent   Transfer Status Independent   Ambulation Independent   Ambulation Distance household   Assistive Devices Used None   Stairs Required Assist   History of Falls   History of Falls Yes   Date of Last Fall   (1 week ago)   Cognition    Cognition / Consciousness X   Level of Consciousness Alert   Safety Awareness Impaired;Impulsive   New Learning Impaired   Comments pt with significant improvement in participation and receptivity with daughter present. Very pleasant and participatory once daughter arrived. However prior to daughters arrival pt agitated, refusing OOB mobility, irritated with use of ipad  saying \"no hablo english\" and refusing to answer questions   Active ROM Lower Body    Active ROM Lower Body  WDL   Strength Lower Body   Lower Body Strength  X   Gross Strength Generalized Weakness, Equal Bilaterally   Comments BLE's grossly 4-/5, functional for in room ambulation   Balance Assessment   Sitting Balance (Static) Fair +   Sitting Balance (Dynamic) Fair   Standing Balance (Static) Fair -   Standing Balance (Dynamic) Poor +   Weight Shift Sitting Fair   Weight Shift Standing Fair   Comments w/FWW   Bed Mobility    Supine to Sit Contact Guard Assist   Sit to Supine   (in chair post)   Scooting Standby Assist   Comments " HOB elevated   Gait Analysis   Gait Level Of Assist Minimal Assist   Assistive Device Front Wheel Walker   Distance (Feet) 15   # of Times Distance was Traveled 2   Deviation Increased Base Of Support;Bradykinetic;Shuffled Gait;Decreased Heel Strike;Decreased Toe Off   Comments ambulate to/from the bathroom   Functional Mobility   Sit to Stand Contact Guard Assist   Bed, Chair, Wheelchair Transfer Minimal Assist   Toilet Transfers Contact Guard Assist  (toilet riser)   Mobility bed > bathroom > chair   Comments distance limited by pt wanting to sit in the chair to eat; likely capable of furhter distances   6 Clicks Assessment - How much HELP from from another person do you currently need... (If the patient hasn't done an activity recently, how much help from another person do you think he/she would need if he/she tried?)   Turning from your back to your side while in a flat bed without using bedrails? 3   Moving from lying on your back to sitting on the side of a flat bed without using bedrails? 3   Moving to and from a bed to a chair (including a wheelchair)? 3   Standing up from a chair using your arms (e.g., wheelchair, or bedside chair)? 3   Walking in hospital room? 3   Climbing 3-5 steps with a railing? 2   6 clicks Mobility Score 17   Short Term Goals    Short Term Goal # 1 Pt will be able to perform supine <> sit with SPV in 6 visits to progress bed mobility   Short Term Goal # 2 Pt will be able to perform STS with FWW and SPV in 6 visits to progress functional transfers   Short Term Goal # 3 Pt will be able to ambulate 150ft with FWW and SPV in 6 visits to progress functional gait   Short Term Goal # 4 Pt will be able to ascend/descend a flight of stairs with SPV in 6 visits in order to safely access his home   Education Group   Education Provided Role of Physical Therapist;Gait Training;Use of Assistive Device   Role of Physical Therapist Patient Response Patient;Acceptance;Explanation;Verbal Demonstration    Gait Training Patient Response Patient;Acceptance;Explanation;Verbal Demonstration   Use of Assistive Device Patient Response Patient;Acceptance;Explanation;Action Demonstration   Additional Comments Educated pt and pt's daughter on importance of frequent mobility, mobilizing to the chair for all meals, pathologies of bed rest. Pt's daughter stepped out for part of session to speak with MD.   Physical Therapy Initial Treatment Plan    Treatment Plan  Bed Mobility;Gait Training;Neuro Re-Education / Balance;Self Care / Home Evaluation;Stair Training;Therapeutic Activities;Therapeutic Exercise   Treatment Frequency 4 Times per Week   Duration Until Therapy Goals Met   Problem List    Problems Pain;Impaired Bed Mobility;Impaired Transfers;Impaired Ambulation;Functional ROM Deficit;Functional Strength Deficit;Impaired Balance;Decreased Activity Tolerance;Safety Awareness Deficits / Cognition   Anticipated Discharge Equipment and Recommendations   DC Equipment Recommendations Unable to determine at this time   Discharge Recommendations Recommend post-acute placement for additional physical therapy services prior to discharge home   Interdisciplinary Plan of Care Collaboration   IDT Collaboration with  Nursing;Therapy Tech;Family / Caregiver   Patient Position at End of Therapy Seated;Chair Alarm On;Call Light within Reach;Tray Table within Reach;Phone within Reach;Family / Friend in Room   Collaboration Comments RN updated   Session Information   Date / Session Number  8/5 - 1 (1/4, 8/11)

## 2025-08-05 NOTE — CARE PLAN
The patient is Watcher - Medium risk of patient condition declining or worsening    Shift Goals  Clinical Goals: abx, safety, communication, wean o2  Patient Goals: rest  Family Goals: POC    Progress made toward(s) clinical / shift goals:    Problem: Knowledge Deficit - Standard  Goal: Patient and family/care givers will demonstrate understanding of plan of care, disease process/condition, diagnostic tests and medications  Outcome: Not Met     Problem: Safety - Medical Restraint  Goal: Free from restraint(s) (Restraint for Interference with Medical Device)  8/5/2025 0634 by Diana Cottrell R.N.  Outcome: Not Met  8/5/2025 0225 by Diana Cottrell R.N.  Outcome: Not Met     Problem: Psychosocial  Goal: Patient's ability to re-evaluate and adapt role responsibilities will improve  Outcome: Not Met  Goal: Spiritual and cultural needs incorporated into hospitalization  Outcome: Not Met       Patient is not progressing towards the following goals:      Problem: Knowledge Deficit - Standard  Goal: Patient and family/care givers will demonstrate understanding of plan of care, disease process/condition, diagnostic tests and medications  Outcome: Not Met     Problem: Safety - Medical Restraint  Goal: Free from restraint(s) (Restraint for Interference with Medical Device)  8/5/2025 0634 by Diana Cottrell, R.N.  Outcome: Not Met  8/5/2025 0225 by Diana Cottrell, R.N.  Outcome: Not Met     Problem: Psychosocial  Goal: Patient's level of anxiety will decrease  8/5/2025 0634 by Diana Cottrell, R.N.  Outcome: Progressing  Note: Patient still anxious and attempting to remove lines and get out of bed. Unable to discuss with patient. Discussed with family.   8/5/2025 0225 by Diana Cottrell, R.N.  Outcome: Not Met  Goal: Patient's ability to re-evaluate and adapt role responsibilities will improve  Outcome: Not Met  Goal: Spiritual and cultural needs incorporated into hospitalization  Outcome: Not Met

## 2025-08-06 ASSESSMENT — ENCOUNTER SYMPTOMS
FEVER: 0
BACK PAIN: 0
CHILLS: 0
ABDOMINAL PAIN: 0
SHORTNESS OF BREATH: 0

## 2025-08-06 ASSESSMENT — PAIN DESCRIPTION - PAIN TYPE
TYPE: ACUTE PAIN
TYPE: ACUTE PAIN

## 2025-08-06 ASSESSMENT — COGNITIVE AND FUNCTIONAL STATUS - GENERAL
DRESSING REGULAR LOWER BODY CLOTHING: A LOT
PERSONAL GROOMING: A LITTLE
DAILY ACTIVITIY SCORE: 14
TOILETING: A LOT
SUGGESTED CMS G CODE MODIFIER DAILY ACTIVITY: CK
EATING MEALS: A LITTLE
DRESSING REGULAR UPPER BODY CLOTHING: A LOT
HELP NEEDED FOR BATHING: A LOT

## 2025-08-06 NOTE — DISCHARGE PLANNING
1021: 2nd IMM delivered to patient's Daughter/Katharine SALOMON: [phone], today, 8/6/2025, at 1021. Daughter verbalized agreement with patient's discharge once medically cleared.

## 2025-08-06 NOTE — CARE PLAN
The patient is Stable - Low risk of patient condition declining or worsening    Shift Goals  Clinical Goals: Safety, Monitor VS  Patient Goals: Rest  Family Goals: Updates    Progress made toward(s) clinical / shift goals:    Problem: Knowledge Deficit - Standard  Goal: Patient and family/care givers will demonstrate understanding of plan of care, disease process/condition, diagnostic tests and medications  Outcome: Progressing  Note: Plan of care discussed with family and patient including plans for rehab and any prescribed medications. Discussed patient condition and fall precautions.      Problem: Skin Integrity  Goal: Skin integrity is maintained or improved  Outcome: Progressing  Note: Patient skin remains intact. Patient able to turn independently and ambulates.      Problem: Fall Risk  Goal: Patient will remain free from falls  Outcome: Progressing  Note: Fall risk precautions in place. Discussed precautions with family and patient.        Patient is not progressing towards the following goals:

## 2025-08-06 NOTE — THERAPY
"Occupational Therapy  Daily Treatment     Patient Name:  Sammy Yanez  Age:  89 y.o., Sex:  male  Medical Record #:  [id number]  Today's Date:  8/6/2025    Precautions  Medical: (P) Fall Risk, Altered Mentation    Assessment    Pt greeted and seen for OT treatment session; family present at start of session and supportive. Pt required max encouragement from family to mobilize to chair for lunch. Required min A for mobility and seated UB ADLs. Continues to require max A for LB dressing. Extra time needed for all mobility/ADLs. Required mod verbal cueing to sequence mobility and self-feeding. Pt requesting return to bed following meal but quickly requested return to chair once supine stating \"I think the chair is better.\" Extensive education provided on role of OT, energy conservation techniques, gradual return to activity, importance of frequent EOB/OOB activity and use of call-light. Pt verbalized understanding with return demonstration of which button to push to get a hold of RN. See grid below for more details. Will continue to follow.     Plan    Treatment Plan Status: (P) Continue Current Treatment Plan  Type of Treatment: Self Care / Activities of Daily Living, Adaptive Equipment, Neuro Re-Education / Balance, Therapeutic Exercises, Therapeutic Activity, Family / Caregiver Training  Treatment Frequency: 3 Times per Week  Treatment Duration: Until Therapy Goals Met    DC Equipment Recommendations: (P) Unable to determine at this time  Discharge Recommendations: (P) Recommend post-acute placement for additional occupational therapy services prior to discharge home    Subjective    \"The food is good but it's too dry.\"     Objective     08/06/25 1356   Vitals   O2 Delivery Device None - Room Air   Vitals Comments VSS throughout with no c/o dizziness or light headedness   Pain 0 - 10 Group   Therapist Pain Assessment During Activity;Post Activity Pain Same as Prior to Activity;Nurse Notified  (Increased pain with " mobility; not quantified)   Balance   Sitting Balance (Static) Fair +   Sitting Balance (Dynamic) Fair   Standing Balance (Static) Fair -   Standing Balance (Dynamic) Poor +   Weight Shift Sitting Fair   Weight Shift Standing Fair   Skilled Intervention Compensatory Strategies;Verbal Cuing;Sequencing   Comments HHA in standing; declined FWW   Bed Mobility    Supine to Sit Minimal Assist   Sit to Supine Minimal Assist  (BLE management)   Scooting Standby Assist   Skilled Intervention Compensatory Strategies;Facilitation;Postural Facilitation;Tactile Cuing;Sequencing;Verbal Cuing   Comments HOB slightly elevated. Groggy on arrival requiring increased assist and encouragement to participate.   Activities of Daily Living   Eating Minimal Assist   Grooming Standby Assist;Seated   Lower Body Dressing Maximal Assist   Toileting   (declined need)   Skilled Intervention Compensatory Strategies;Facilitation;Sequencing;Verbal Cuing;Tactile Cuing   Functional Mobility   Sit to Stand Minimal Assist   Bed, Chair, Wheelchair Transfer Minimal Assist   Toilet Transfers   (Declined need)   Transfer Method Stand Step   Mobility HHA; bed > chair > bed   Skilled Intervention Facilitation;Compensatory Strategies;Sequencing;Tactile Cuing;Verbal Cuing   Comments Requesting return to bed following lunch. Following return to bed, requesting to get back to the chair for comfort.   Activity Tolerance   Comments Limited by weakness, cognition and fatigue   Short Term Goals   Short Term Goal # 1 pt will follow 1 step direction 100% of the time   Goal Outcome # 1 Progressing as expected   Short Term Goal # 2 pt will demo ADL txfs w/ supv   Goal Outcome # 2 Progressing as expected   Short Term Goal # 3 pt will dress LB w/ Magdalene   Goal Outcome # 3 Progressing slower than expected   Education Group   Education Provided Role of Occupational Therapist;Energy Conservation;Pathology of bedrest;Use of Call Light   Role of Occupational Therapist Patient  Response Patient;Family;Acceptance;Explanation;Verbal Demonstration   Energy Conservation Patient Response Patient;Family;Acceptance;Explanation;Verbal Demonstration   Use of Call Light Patient Response Patient;Acceptance;Explanation;Demonstration;Teach Back;Verbal Demonstration;Action Demonstration;Reinforcement Needed   Pathology of Bedrest Patient Response Patient;Family;Acceptance;Explanation;Verbal Demonstration   Occupational Therapy Treatment Plan    O.T. Treatment Plan Continue Current Treatment Plan   Anticipated Discharge Equipment and Recommendations   DC Equipment Recommendations Unable to determine at this time   Discharge Recommendations Recommend post-acute placement for additional occupational therapy services prior to discharge home   Interdisciplinary Plan of Care Collaboration   IDT Collaboration with  Nursing;Family / Caregiver   Patient Position at End of Therapy Seated;Chair Alarm On;Tray Table within Reach;Phone within Reach;Call Light within Reach   Collaboration Comments RN updated   Session Information   Date / Session Number  8/6, 2 (2/3, 8/6)

## 2025-08-06 NOTE — DISCHARGE PLANNING
DC Transport Scheduled    Transport Company Scheduled:  [facility]  Spoke with Chiquis at [facility] to schedule transport.    Scheduled Date: 8/7/2025  Scheduled Time: WILL CALL     Transport Type: Mary  Destination: [facility]   Destination address: [facility] [address] [place] [address]    Notified care team of scheduled transport via Voalte.     If there are any changes needed to the DC transportation scheduled, please contact [facility] at ext. [phone] between the hours of [phone]. If outside those hours, contact the ED Case Manager at ext. [phone].

## 2025-08-06 NOTE — DISCHARGE PLANNING
[facility] Transitional Care Coordination    Referral from: Dr. Morales    Insurance Provider on Facesheet: MCR/MUNA    Potential Rehab Diagnosis: NTBI    Chart review indicates patient may have on going medical management and may have therapy needs to possibly meet inpatient rehab facility criteria with the goal of returning to community.    D/C support will need to be verified: Daughter    Physiatry consultation pended per protocol.  Following for an updated OT eval.      Thank you for the referral.     [facility] and no need for a PMR consult per [place] [facility].  [facility] will no longer follow.  Please reach out to myself with any questions.

## 2025-08-06 NOTE — PROGRESS NOTES
Hospital Medicine Daily Progress Note    Date of Service  8/6/2025    Chief Complaint  Sammy Yanez is a 89 y.o. male admitted 7/28/2025 with altered mental status    Hospital Course  89-year-old male with a past medical history of COPD, hypertension, mood disorder presented with altered mental status.  Patient was also noted to have acute respiratory failure with hypoxia which has continued to worsen.  Patient has tested positive for COVID, also had an elevated procalcitonin and was placed on antibiotics.  Due to ongoing worsening, infectious disease was consulted and patient was started on baricitinib and decadron.  Patient was also upgraded to IMCU as he was requiring high flow nasal cannula.  Discussion was had with the family and patient's CODE STATUS changed to DNR/DNI.  Patient was weaned off high flow and downgraded to medical floor.    Interval Problem Update  8/5 intermittent hypertension, oxygen weaned down to 2 L oxymask  -At time of my evaluation patient on room air  WBC 11.7, hemoglobin 12.9  Creatinine stable  Patient is still on barcitinib and Decadron, he has finished his IV antibiotics  Therapy last recommended placement however have not seen since 7/30  Per night APRN patient still agitated requiring as needed Haldol and placed in mitten restraints.   Patient able to answer orientation questions however he states he wants to go home.  On further questioning patient cannot elaborate on how he would get safety or how he will be able to manage at home.  At this time he does not have capacity to make his own medical decisions.  Discussed with family at bedside, they report patient has been depressed refusing to eat and had expressed suicidal ideation to them prior to admission.  They are extremely worried about him going home as they have stairs that he would have to climb to get to his room.  They are agreeable to postacute placement.   Discussed with psychiatry given patient's ongoing agitation  and prior depression, recommending to titrate off Celexa as it does not appear to be improving his mood can also use Seroquel as needed for agitation overnight on top of the nightly Seroquel dose    8/6 vitals stable on room air  WBC 12, hemoglobin 12.6  Renal function stable  AST 64,   Discussed with psychiatry who recommended to move Seroquel dose to the morning with as needed at night  Patient more calm today has not required restraints.  He is still confused overall but pleasant.  Discussed with family at bedside, they are happy with plan for SNF placement.  Discussed that patient should not be driving a car.  Ultimately he will need to follow-up outpatient with primary care once he is discharged from rehab.    I have discussed this patient's plan of care and discharge plan at IDT rounds today with Case Management, Nursing, Nursing leadership, and other members of the IDT team.    Consultants/Specialty  infectious disease  Psychiatry    Code Status  DNAR/DNI    Disposition  The patient is not medically cleared for discharge to home or a post-acute facility.  Anticipate discharge to: skilled nursing facility    I have placed the appropriate orders for post-discharge needs.    Review of Systems  Review of Systems   Unable to perform ROS: Acuity of condition   Constitutional:  Negative for chills and fever.   Respiratory:  Negative for shortness of breath.    Cardiovascular:  Negative for chest pain.   Gastrointestinal:  Negative for abdominal pain.   Musculoskeletal:  Negative for back pain.        Physical Exam  Temp:  [36.3 °C (97.3 °F)-36.6 °C (97.9 °F)] 36.3 °C (97.3 °F)  Pulse:  [66-85] 66  Resp:  [16-18] 17  BP: (119-144)/(58-91) 138/91  SpO2:  [90 %-92 %] 92 %    Physical Exam  Vitals and nursing note reviewed.   Constitutional:       General: He is not in acute distress.     Appearance: He is well-developed. He is obese. He is ill-appearing.   HENT:      Head: Normocephalic and atraumatic.       Mouth/Throat:      Pharynx: Oropharynx is clear.   Eyes:      General:         Right eye: No discharge.         Left eye: No discharge.      Conjunctiva/sclera: Conjunctivae normal.   Neck:      Trachea: No tracheal deviation.   Cardiovascular:      Rate and Rhythm: Normal rate. Rhythm irregular.   Pulmonary:      Effort: Pulmonary effort is normal. No respiratory distress.      Breath sounds: No wheezing.      Comments: On room air at time of my evaluation  Abdominal:      General: There is no distension.      Palpations: Abdomen is soft.      Tenderness: There is no abdominal tenderness.   Musculoskeletal:      Cervical back: Neck supple. No edema or erythema.      Right lower leg: No edema.      Left lower leg: No edema.   Skin:     General: Skin is warm and dry.   Neurological:      General: No focal deficit present.      Mental Status: He is alert.      Motor: Weakness present.   Psychiatric:         Behavior: Behavior normal.         Cognition and Memory: Cognition is impaired. Memory is impaired.         Fluids    Intake/Output Summary (Last 24 hours) at 8/6/2025 1430  Last data filed at 8/6/2025 0900  Gross per 24 hour   Intake 840 ml   Output 0 ml   Net 840 ml        Laboratory  Recent Labs     08/04/25  0450 08/05/25  0108 08/06/25  0025   WBC 8.4 11.7* 12.0*   RBC 3.74* 4.13* 4.06*   HEMOGLOBIN 11.8* 12.9* 12.6*   HEMATOCRIT 34.6* 39.2* 38.1*   MCV 92.5 94.9 93.8   MCH 31.6 31.2 31.0   MCHC 34.1 32.9 33.1   RDW 51.8* 51.9* 52.1*   PLATELETCT 278 309 300   MPV 8.2* 8.5* 8.7*     Recent Labs     08/04/25  0450 08/05/25  0108 08/06/25  0025   SODIUM 141 138 138   POTASSIUM 3.9 4.0 4.0   CHLORIDE 109 106 107   CO2 21 20 20   GLUCOSE 103* 105* 122*   BUN 27* 32* 33*   CREATININE 0.91 0.96 0.95   CALCIUM 8.8 9.1 9.1                   Imaging  DX-CHEST-LIMITED (1 VIEW)   Final Result         1.  Essentially unchanged chest radiograph with left greater than right bibasilar opacities.   2.  Likely bilateral small  left greater greater than right pleural effusions.      DX-CHEST-PORTABLE (1 VIEW)   Final Result         1.  Scattered hazy bilateral pulmonary infiltrates, greatest on the left, stable since prior study.         DX-CHEST-PORTABLE (1 VIEW)   Final Result         1.  Scattered hazy bilateral pulmonary infiltrates, greatest on the left.      CT-HEAD W/O   Final Result         1.  No acute intracranial abnormality is identified, there are nonspecific white matter changes, commonly associated with small vessel ischemic disease.  Associated mild cerebral atrophy is noted.   2.  Atherosclerosis.              Assessment/Plan  * Acute hypoxic respiratory failure (HCC)- (present on admission)  Assessment & Plan  Had been acutely worsening  Now requiring high flow nasal cannula  Procalcitonin was mildly elevated, finished antibiotics  COVID was positive, infectious disease is now following  Patient remains DNR  Is at significant risk of worsening and does require close monitoring in the IMCU  Continuous pulse ox monitoring  Continue respiratory care per protocol  Any attempted weaning and patient significantly desaturates  Required more oxygen via high flow nasal cannula, no significant change on chest x-ray  Has been weaned off HFNC    On room air this afternoon    Pneumonia due to COVID-19 virus  Assessment & Plan  Patient also with an elevated procalcitonin, continue Unasyn and azithromycin  Infectious diseases following  Due to the severity of his disease, ID is recommending baricitinib, continue, last dose on 8/12  -Discontinue baricitinib if:  ALT >250  AST >225  eGFR <15  ALC < 200   ANC < 1000  Hg < 8  -OK to discontinue barticinib if patient cleared for discharge  TB testing negative  Able to wean off high flow nasal cannula, good sats on 6 L mask  Continue Decadron    PT/OT- post acute   SNF placement, will have to wait till off restraints 24 hrs     AF (atrial fibrillation) (HCC)  Assessment & Plan  Appears  sinus  Patient now able to swallow, continue home Eliquis and metoprolol    Acute encephalopathy  Assessment & Plan  Likely due to delirium and infection   Agitated at times  Passed swallow evaluation  Psychiatry consulted for agitation  DECREASE citalopram to 10 mg for 1 week and discontinue after due to concerns of QT prolongation and ineffective treatment  INITIATE sertraline 25 mg daily for depression.  May consider increasing to 50 mg daily after 3 to 5 days if tolerating things patient is EKG  CONTINUE Seroquel 25 mg PO nightly for agitation  INITIATE Seroquel up to once daily 25mg po prn for agitation    At this time patient does not have capacity to make medical decision    Elevated troponin  Assessment & Plan  Type II from atrial fibrillation and respiratory failure  No additional workup at this time    Advanced care planning/counseling discussion- (present on admission)  Assessment & Plan  Confirmed patient is DNR/DNI.  Discussed ongoing encephalopathy could very well be due to his COVID infection and prolonged hospitalized in IMCU requiring high flow oxygen.  Prior to hospitalization patient was very depressed and not eating and not participating with his family.  They were considering hospice at that time.  After psychiatry met with them earlier they are hopeful the medication changes will help with patient's mood.  Discussed prolonged encephalopathy that occasionally occurs with COVID infection, given patient's age if he does not progress at SNF hospice could be a good option in the future    Hypothyroid- (present on admission)  Assessment & Plan  Continue Synthroid 25 mcg daily    Essential hypertension- (present on admission)  Assessment & Plan  Continue metoprolol and lisinopril   Continue as needed hydralazine  Adjust as needed       Total time spent in chart review, at bedside with the patient and family, discussing with consultants, nursing and case management: 52 minutes    VTE prophylaxis:  Eliquis 5 mg BID     I have performed a physical exam and reviewed and updated ROS and Plan today (8/6/2025). In review of yesterday's note (8/5/2025), there are no changes except as documented above.

## 2025-08-06 NOTE — DISCHARGE PLANNING
Medically cleared for post-acute placement.   Daughter/NOK, Katharine: [phone], is primary decision maker at this time. No AD paperwork on file and clinically inappropriate for AD paperwork at this time.     -Multiple accepting [facility]F. Writer left choice form with daughter yesterday, 8/5/2025. Writer followed up with daughter this morning who gave verbal choice (over phone) for SNF: 1. [facility], 2. [facility], 3. [place].     -Writer also placed Physiatry consult per protocol as he has moderate family support, Medicare A/B, and possibly have qualifying diagnosis. Pending response from [facility].     -Writer requested DPA follow up with top choices on their current capacity to receive COVID+ patients and if they are requiring 10 days (which is tomorrow, 8/7/2025).     1235: DPA called [facility] (Daughter's first choice) and will require 1 more day at [facility]. [facility] will have bed tomorrow. IDT notified and patient's NOK agreeable to transfer.   COBRA and PCS forms partially completed, in anticipation of discharge tomorrow via [facility].     Case Management Discharge Planning    Admission Date: 7/28/2025  GMLOS: 4.6  ALOS: 9    6-Clicks ADL Score: 14  6-Clicks Mobility Score: 17  PT and/or OT Eval ordered: Yes  Post-acute Referrals Ordered: Yes  Post-acute Choice Obtained: Yes  Has referral(s) been sent to post-acute provider:  Yes    Anticipated Discharge Dispo: Discharge Disposition: Disch to a long term care facility (63)    DME Needed: No    Action(s) Taken: Updated Provider/Nurse on Discharge Plan, Referral(s) sent, and Family Conference    Escalations Completed: Provider and Pending Discharge Destination    Medically Clear: Yes    Next Steps: Pending 10 days from initial COVID-19 positive result (7/28/2025) which ends tomorrow, 8/7/2025.     Barriers to Discharge: 10 day from initial COVID result.     Is the patient up for discharge tomorrow: Anticipate tomorrow, 8/7/2025, if unable to discharge to SNF  tonight due to COVID protocol.

## 2025-08-06 NOTE — PROGRESS NOTES
4 Eyes Skin Assessment Completed by KARUNA Ortiz and KARUNA Berrios.    Skin assessment is primarily focused on high risk bony prominences. Pay special attention to skin beneath and around medical devices, high risk bony prominences, skin to skin areas and areas where the patient lacks sensation to feel pain and areas where the patient previously had breakdown.     Head (Occipital):  WDL   Ears (Under Medical Devices): WDL   Nose (Under Medical Devices): WDL   Mouth:  WDL   Neck: WDL   Breast/Chest:  WDL   Shoulder Blades:  WDL   Spine:   WDL   (R) Arm/Elbow/Hand: Bruising   (L) Arm/Elbow/Hand: Bruising   Abdomen: Scar and old scabs   Pannus/Groin:  Red   Sacrum/Coccyx:   WDL   (R) Ischial Tuberosity (Sit Bones):  WDL   (L) Ischial Tuberosity (Sit Bones):  WDL   (R) Leg:  WDL   (L) Leg:  WDL   (R) Heel:  Red and Blanching   (R) Foot/Toe: WDL   (L) Heel: Red and Blanching   (L) Foot/Toe:  WDL       DEVICES IN USE:   Respiratory Devices:  NA, patient on room air  Feeding Devices:  N/A   Lines & BP Monitoring Devices:  Peripheral IV and Pulse ox    Orthopedic Devices:  N/A  Miscellaneous Devices:  N/A    PROTOCOL INTERVENTIONS:   Standard/Trauma Bed:  Already in place    WOUND PHOTOS:   N/A no wounds identified    WOUND CONSULT:   N/A, no advanced wound care needs identified

## 2025-08-07 ENCOUNTER — PATIENT OUTREACH (OUTPATIENT)
Dept: SCHEDULING | Facility: IMAGING CENTER | Age: 89
End: 2025-08-07
Payer: MEDICARE

## 2025-08-07 NOTE — DISCHARGE SUMMARY
Discharge Summary    CHIEF COMPLAINT ON ADMISSION  Chief Complaint   Patient presents with    Other       Reason for Admission  EMS    Admission Date  7/28/2025     CODE STATUS  DNAR/DNI    HPI & HOSPITAL COURSE  This is a 89 y.o. male here with altered mental status      89-year-old male with a past medical history of COPD, hypertension, mood disorder presented with altered mental status. Patient was also noted to have acute respiratory failure with hypoxia which has continued to worsen. Patient has tested positive for COVID, also had an elevated procalcitonin and was placed on antibiotics. Due to ongoing worsening, infectious disease was consulted and patient was started on baricitinib and decadron. Patient was also upgraded to IMCU as he was requiring high flow nasal cannula. Discussion was had with the family and patient's CODE STATUS changed to DNR/DNI. Patient was weaned off high flow and downgraded to medical floor.     Patient has been weaned off oxygen and has been stable on room air.  He did have issues with agitation likely some ICU delirium due to COVID.  Patient's family related that patient had been depressed and making some suicidal ideation prior to admission.  Psychiatry was consulted recommended to decrease citalopram to 10 mg for 1 week and then discontinue thereafter as there are concerns for infection treatment.  Recommended to start sertraline 25 mg daily for depression, can consider increasing to 50 mg daily after 3 to 5 days if qtc stable on repeat EKG.  Continue Seroquel 25 mg nightly.  PT/OT evaluated the patient recommended postacute placement.  Patient able to answer orientation questions correctly intermittently however he does not have capacity to make medical decisions further questioning.  Patient's family agreeable to SNF placement.  He will need to follow-up with primary care post rehab placement.  He is to return to the ER if his condition worsens.    Therefore, he is discharged in  fair and stable condition to skilled nursing facility.    The patient met 2-midnight criteria for an inpatient stay at the time of discharge.      FOLLOW UP ITEMS POST DISCHARGE  Follow-up with primary care   In 3-5 days consider repeat EKG and increasing sertraline dose of qtc stable    DISCHARGE DIAGNOSES  Principal Problem:    Acute hypoxic respiratory failure (HCC) (POA: Yes)  Active Problems:    Essential hypertension (POA: Yes)    Hypothyroid (POA: Yes)    Advanced care planning/counseling discussion (POA: Yes)    Elevated troponin (POA: Unknown)    Acute encephalopathy (POA: Unknown)    AF (atrial fibrillation) (HCC) (POA: Unknown)    Pneumonia due to COVID-19 virus (POA: Unknown)  Resolved Problems:    * No resolved hospital problems. *      FOLLOW UP  Future Appointments   Date Time Provider Department Center   9/17/2025  4:00 PM Jj Molina M.D. [facility]   11/17/2025  2:45 PM Bruce Romo M.D. [facility] None     No follow-up provider specified.    MEDICATIONS ON DISCHARGE     Medication List        START taking these medications        Instructions   acetaminophen 325 MG Tabs  Commonly known as: Tylenol   Take 2 Tablets by mouth every 6 hours as needed for Moderate Pain, Mild Pain or Fever.  Dose: 650 mg     QUEtiapine 25 MG Tabs  Commonly known as: SEROquel   Take 1 Tablet by mouth every evening.  Dose: 25 mg     sertraline 25 MG tablet  Start taking on: August 8, 2025  Commonly known as: Zoloft   Take 1 Tablet by mouth every day.  Dose: 25 mg            CHANGE how you take these medications        Instructions   citalopram 10 MG tablet  Start taking on: August 8, 2025  What changed:   medication strength  how much to take  Commonly known as: CeleXA   Take 1 Tablet by mouth every day for 6 days.  Dose: 10 mg     metoprolol SR 50 MG Tb24  What changed: Another medication with the same name was removed. Continue taking this medication, and follow the directions you see here.  Commonly known  as: Toprol XL   Take 1 Tablet by mouth every day.  Dose: 50 mg            CONTINUE taking these medications        Instructions   albuterol 2.5mg/3ml Nebu solution for nebulization  Commonly known as: Proventil   Take 3 mL by nebulization every four hours as needed for Shortness of Breath. Use with nebuliser machine that should be delivered separately through [employer] - contact PCP office if you dont hear drom [employer] company  Dose: 2.5 mg     apixaban 5 MG Tabs  Commonly known as: Eliquis   Take 1 Tablet by mouth 2 times a day.  Dose: 5 mg     atorvastatin 40 MG Tabs  Commonly known as: Lipitor   Take 1 Tablet by mouth every day. Take 1 tablet by mouth every night at bedtime  Dose: 40 mg     cyanocobalamin 500 MCG Tabs  Commonly known as: Vitamin B-12   Take 500 mcg by mouth every day.  Dose: 500 mcg     finasteride 5 MG Tabs  Commonly known as: Proscar   Take 5 mg by mouth every day.  Dose: 5 mg     fish oil 1000 MG Caps capsule   Take 1,000 mg by mouth every day.  Dose: 1,000 mg     guaiFENesin  MG Tb12  Commonly known as: Mucinex   TAKE 1 TABLET BY MOUTH EVERY 12 HOURS AS NEEDED FOR COUGH  Dose: 600 mg     levothyroxine 25 MCG Tabs  Commonly known as: Synthroid   Take 1 Tablet by mouth every morning on an empty stomach. Except one day of the week when he should take two tablets instead of one ( total 50mcg)  Dose: 25 mcg     lisinopril 10 MG Tabs  Commonly known as: Prinivil   Take 1 Tablet by mouth every day.  Dose: 10 mg     metFORMIN 500 MG Tabs  Commonly known as: Glucophage   Doctor's comments: **Patient requests 90 days supply**  TAKE 1 TABLET BY MOUTH TWICE DAILY WITH MEALS. START WITH 1 TABLET A DAY FOR FIRST WEEK     therapeutic multivitamin-minerals Tabs   Take 1 Tablet by mouth every day.  Dose: 1 Tablet              Allergies  Allergies[1]    DIET  Orders Placed This Encounter   Procedures    Diet Order Diet: Level 5 - Minced and Moist (Chin tuck w/ liquids, meds 1x/time in applesauce; if  coughing contact SLP will downgrade to thicker liquids); Liquid level: Level 0 - Thin; Tray Modifications (optional): SLP - 1:1 Supervision by Nursin...     Standing Status:   Standing     Number of Occurrences:   1     Diet::   Level 5 - Minced and Moist [24]              Chin tuck w/ liquids, meds 1x/time in applesauce; if coughing contact SLP will downgrade to thicker liquids     Liquid level:   Level 0 - Thin     Tray Modifications (optional):   SLP - 1:1 Supervision by Nursing       ACTIVITY  As tolerated.  Weight bearing as tolerated    LINES, DRAINS, AND WOUNDS  This is an automated list. Peripheral IVs will be removed prior to discharge.  Peripheral IV 08/04/25 18 G Anterior;Right Forearm (Active)   Site Assessment Clean;Dry;Intact 08/06/25 2000   Dressing Type Occlusive;Transparent 08/06/25 2000   Line Status Scrubbed the hub prior to access;Flushed;Saline locked 08/06/25 2000   Dressing Status Clean;Intact;Dry 08/06/25 2000   Dressing Intervention N/A 08/06/25 2000   Dressing Change Due 08/09/25 08/06/25 2000   Infiltration Grading ([facility]) 0 08/06/25 2000   Phlebitis Scale ([facility] Only) 0 08/06/25 2000     External Urinary Catheter ([facility]) (Active)   Collection Container Standard drainage bag 08/05/25 0800   Output (mL) 0 mL 08/05/25 1200         Peripheral IV 08/04/25 18 G Anterior;Right Forearm (Active)   Site Assessment Clean;Dry;Intact 08/06/25 2000   Dressing Type Occlusive;Transparent 08/06/25 2000   Line Status Scrubbed the hub prior to access;Flushed;Saline locked 08/06/25 2000   Dressing Status Clean;Intact;Dry 08/06/25 2000   Dressing Intervention N/A 08/06/25 2000   Dressing Change Due 08/09/25 08/06/25 2000   Infiltration Grading ([facility]) 0 08/06/25 2000   Phlebitis Scale ([facility] Only) 0 08/06/25 2000               MENTAL STATUS ON TRANSFER             CONSULTATIONS  Psychiatry  Infectious disease    PROCEDURES  none    LABORATORY  Lab Results   Component Value Date    SODIUM 138  08/06/2025    POTASSIUM 4.0 08/06/2025    CHLORIDE 107 08/06/2025    CO2 20 08/06/2025    GLUCOSE 122 (H) 08/06/2025    BUN 33 (H) 08/06/2025    CREATININE 0.95 08/06/2025    CREATININE 0.8 11/07/2005        Lab Results   Component Value Date    WBC 12.0 (H) 08/06/2025    HEMOGLOBIN 12.6 (L) 08/06/2025    HEMATOCRIT 38.1 (L) 08/06/2025    PLATELETCT 300 08/06/2025        Total time of the discharge process exceeds 34 minutes.       [1] No Known Allergies

## 2025-08-07 NOTE — DISCHARGE INSTRUCTIONS
Discharge Instructions    Discharged to other by medical transportation with escort. Discharged via ambulance, hospital escort: Yes.  Special equipment needed: Not Applicable    Be sure to schedule a follow-up appointment with your primary care doctor or any specialists as instructed.     Discharge Plan:   Diet Plan: Discussed  Activity Level: Discussed  Confirmed Follow up Appointment: Patient to Call and Schedule Appointment  Confirmed Symptoms Management: Discussed  Medication Reconciliation Updated: Yes    I understand that a diet low in cholesterol, fat, and sodium is recommended for good health. Unless I have been given specific instructions below for another diet, I accept this instruction as my diet prescription.   Other diet: NONE    Special Instructions: None    -Is this patient being discharged with medication to prevent blood clots?  No    Is patient discharged on Warfarin / Coumadin?   No

## 2025-08-07 NOTE — DISCHARGE PLANNING
Medically cleared for post-acute placement.   Choice obtained and accepted with [facility].   Daughter/Katharine SALOMON: [phone], is acting as primary decision maker due to patient's lack of capacity per IDT.     -COBRA verbally signed by daughter this morning via phone and verbally by physician (on different units). Discharge packet complete with face sheet x2, dishcarge summary and 72 hours of chart notes. Patient is not discharging on any controlled substances.     -Completed DC packet given to bedside RN, along with number for RN report to [facility]: [phone].     -2nd IMM delivered to and verbally signed by Daughter/Katharine SALOMON, yesterday, 8/6/2025, at 1021. Daughter is agreeable with patient's discharge today.     -Transportation confirmed for [facility] pickup today, 8/7/2025, at 1430. [facility] notified.     -Anticipate no further needs from case management prior to discharge.     Case Management Discharge Planning    Admission Date: 7/28/2025  GMLOS: 4.6  ALOS: 10    6-Clicks ADL Score: 14  6-Clicks Mobility Score: 17  PT and/or OT Eval ordered: Yes  Post-acute Referrals Ordered: Yes  Post-acute Choice Obtained: Yes  Has referral(s) been sent to post-acute provider:  Yes    Anticipated Discharge Dispo: Discharge Disposition: Disch to a long term care facility (63)    DME Needed: No    Action(s) Taken: Updated Provider/Nurse on Discharge Plan, Transport Arranged , and Family Conference    Escalations Completed: None    Medically Clear: Yes    Next Steps: Anticipate no further needs from case management prior to discharge.     Barriers to Discharge: None    Is the patient up for discharge tomorrow: Today, 8/7/2025, at 1430 via [facility].

## 2025-08-07 NOTE — PROGRESS NOTES
Pt discharged at 1437 via gurney escorted by joan to [facility]. Discharge paper work and education has been completed with family. All IV's pulled. Core measures sheet completed and sign by RN. All belongings checked and taken with patient. All questions and concerns were met at time of discharge.

## 2025-08-07 NOTE — DISCHARGE PLANNING
0823  Agency/Facility Name: [facility]  Outcome: DPA LVM inquiring bed availability.  DPA to follow up again soon.     1001  DPA confirmed transportation at 1430 with Katrin at [facility].

## 2025-08-07 NOTE — CARE PLAN
The patient is Stable - Low risk of patient condition declining or worsening    Shift Goals  Clinical Goals: Safety, Stable VS  Patient Goals: Rest  Family Goals: Updates    Progress made toward(s) clinical / shift goals:    Problem: Knowledge Deficit - Standard  Goal: Patient and family/care givers will demonstrate understanding of plan of care, disease process/condition, diagnostic tests and medications  Outcome: Progressing  Note: Discussed plan of care with family including plan of discharge to rehab facility, prescribed medication, monitoring of vital signs, and monitoring of blood sugar. Family knowledgeable about current plan and prescriptions.      Problem: Fall Risk  Goal: Patient will remain free from falls  Outcome: Progressing  Note: Patient impulsive and hourly rounding and frequent toilet visits are completed. Patient and family aware of fall precautions including bed and chair alarms.      Problem: Psychosocial  Goal: Patient's level of anxiety will decrease  Outcome: Progressing  Note: Patient showing signs of decreased restlessness. Reorientation and distractions are effective.        Patient is not progressing towards the following goals:

## 2025-08-07 NOTE — CARE PLAN
The patient is Stable - Low risk of patient condition declining or worsening    Shift Goals  Clinical Goals: Safety, Monitor VS  Patient Goals: Rest  Family Goals: Updates    Progress made toward(s) clinical / shift goals:          Problem: Knowledge Deficit - Standard  Goal: Patient and family/care givers will demonstrate understanding of plan of care, disease process/condition, diagnostic tests and medications  8/6/2025 1828 by Jorge Estrada RGiuliaN.  Outcome: Progressing  8/6/2025 1827 by Jorge Estrada R.N.  Outcome: Progressing     Problem: Skin Integrity  Goal: Skin integrity is maintained or improved  8/6/2025 1828 by Jorge Estrada R.N.  Outcome: Progressing  8/6/2025 1827 by Jorge Estrada R.N.  Outcome: Progressing     Problem: Fall Risk  Goal: Patient will remain free from falls  8/6/2025 1828 by Jorge Estrada R.N.  Outcome: Progressing  8/6/2025 1827 by Jorge Estrada R.N.  Outcome: Progressing     Problem: Pain - Standard  Goal: Alleviation of pain or a reduction in pain to the patient’s comfort goal  Outcome: Progressing

## 2025-08-22 ENCOUNTER — TELEPHONE (OUTPATIENT)
Dept: INTERNAL MEDICINE | Facility: OTHER | Age: 89
End: 2025-08-22
Payer: MEDICARE

## 2025-09-17 ENCOUNTER — APPOINTMENT (OUTPATIENT)
Dept: INTERNAL MEDICINE | Facility: OTHER | Age: 89
End: 2025-09-17
Payer: MEDICARE